# Patient Record
Sex: FEMALE | Race: BLACK OR AFRICAN AMERICAN | NOT HISPANIC OR LATINO | Employment: FULL TIME | RURAL
[De-identification: names, ages, dates, MRNs, and addresses within clinical notes are randomized per-mention and may not be internally consistent; named-entity substitution may affect disease eponyms.]

---

## 2020-06-03 ENCOUNTER — HISTORICAL (OUTPATIENT)
Dept: ADMINISTRATIVE | Facility: HOSPITAL | Age: 60
End: 2020-06-03

## 2020-07-20 ENCOUNTER — HISTORICAL (OUTPATIENT)
Dept: ADMINISTRATIVE | Facility: HOSPITAL | Age: 60
End: 2020-07-20

## 2020-08-03 ENCOUNTER — HISTORICAL (OUTPATIENT)
Dept: ADMINISTRATIVE | Facility: HOSPITAL | Age: 60
End: 2020-08-03

## 2021-03-31 ENCOUNTER — TELEPHONE (OUTPATIENT)
Dept: FAMILY MEDICINE | Facility: CLINIC | Age: 61
End: 2021-03-31

## 2021-05-18 ENCOUNTER — OFFICE VISIT (OUTPATIENT)
Dept: FAMILY MEDICINE | Facility: CLINIC | Age: 61
End: 2021-05-18
Payer: COMMERCIAL

## 2021-05-18 VITALS
HEIGHT: 67 IN | DIASTOLIC BLOOD PRESSURE: 82 MMHG | OXYGEN SATURATION: 99 % | HEART RATE: 78 BPM | RESPIRATION RATE: 20 BRPM | BODY MASS INDEX: 45.99 KG/M2 | TEMPERATURE: 98 F | SYSTOLIC BLOOD PRESSURE: 136 MMHG | WEIGHT: 293 LBS

## 2021-05-18 DIAGNOSIS — M19.90 ARTHRITIS: ICD-10-CM

## 2021-05-18 DIAGNOSIS — E03.9 HYPOTHYROIDISM, UNSPECIFIED TYPE: Primary | ICD-10-CM

## 2021-05-18 DIAGNOSIS — G89.29 OTHER CHRONIC PAIN: ICD-10-CM

## 2021-05-18 DIAGNOSIS — M79.669 PAIN AND SWELLING OF LOWER LEG, UNSPECIFIED LATERALITY: ICD-10-CM

## 2021-05-18 DIAGNOSIS — M10.9 GOUT, UNSPECIFIED CAUSE, UNSPECIFIED CHRONICITY, UNSPECIFIED SITE: ICD-10-CM

## 2021-05-18 DIAGNOSIS — M79.89 PAIN AND SWELLING OF LOWER LEG, UNSPECIFIED LATERALITY: ICD-10-CM

## 2021-05-18 DIAGNOSIS — E78.5 HYPERLIPIDEMIA, UNSPECIFIED HYPERLIPIDEMIA TYPE: ICD-10-CM

## 2021-05-18 DIAGNOSIS — I10 ESSENTIAL HYPERTENSION: ICD-10-CM

## 2021-05-18 LAB
ALBUMIN SERPL BCP-MCNC: 3.6 G/DL (ref 3.5–5)
ALBUMIN/GLOB SERPL: 1.2 {RATIO}
ALP SERPL-CCNC: 58 U/L (ref 46–118)
ALT SERPL W P-5'-P-CCNC: 19 U/L (ref 13–56)
ANION GAP SERPL CALCULATED.3IONS-SCNC: 8 MMOL/L (ref 7–16)
AST SERPL W P-5'-P-CCNC: 15 U/L (ref 15–37)
BILIRUB SERPL-MCNC: 0.7 MG/DL (ref 0–1.2)
BUN SERPL-MCNC: 9 MG/DL (ref 7–18)
BUN/CREAT SERPL: 12 (ref 6–20)
CALCIUM SERPL-MCNC: 8.8 MG/DL (ref 8.5–10.1)
CHLORIDE SERPL-SCNC: 107 MMOL/L (ref 98–107)
CO2 SERPL-SCNC: 33 MMOL/L (ref 21–32)
CREAT SERPL-MCNC: 0.78 MG/DL (ref 0.55–1.02)
GLOBULIN SER-MCNC: 3.1 G/DL (ref 2–4)
GLUCOSE SERPL-MCNC: 90 MG/DL (ref 74–106)
POTASSIUM SERPL-SCNC: 4.3 MMOL/L (ref 3.5–5.1)
PROT SERPL-MCNC: 6.7 G/DL (ref 6.4–8.2)
SODIUM SERPL-SCNC: 144 MMOL/L (ref 136–145)
T4 FREE SERPL-MCNC: 0.81 NG/DL (ref 0.76–1.46)
TSH SERPL DL<=0.005 MIU/L-ACNC: 4.08 UIU/ML (ref 0.36–3.74)
URATE SERPL-MCNC: 5.2 MG/DL (ref 2.6–6)

## 2021-05-18 PROCEDURE — 84439 ASSAY OF FREE THYROXINE: CPT | Mod: ,,, | Performed by: CLINICAL MEDICAL LABORATORY

## 2021-05-18 PROCEDURE — 80053 COMPREHENSIVE METABOLIC PANEL: ICD-10-PCS | Mod: ,,, | Performed by: CLINICAL MEDICAL LABORATORY

## 2021-05-18 PROCEDURE — 84550 ASSAY OF BLOOD/URIC ACID: CPT | Mod: ,,, | Performed by: CLINICAL MEDICAL LABORATORY

## 2021-05-18 PROCEDURE — 99051 MED SERV EVE/WKEND/HOLIDAY: CPT | Mod: ,,, | Performed by: FAMILY MEDICINE

## 2021-05-18 PROCEDURE — 84550 URIC ACID: ICD-10-PCS | Mod: ,,, | Performed by: CLINICAL MEDICAL LABORATORY

## 2021-05-18 PROCEDURE — 84443 TSH: ICD-10-PCS | Mod: ,,, | Performed by: CLINICAL MEDICAL LABORATORY

## 2021-05-18 PROCEDURE — 80053 COMPREHEN METABOLIC PANEL: CPT | Mod: ,,, | Performed by: CLINICAL MEDICAL LABORATORY

## 2021-05-18 PROCEDURE — 99214 OFFICE O/P EST MOD 30 MIN: CPT | Mod: ,,, | Performed by: FAMILY MEDICINE

## 2021-05-18 PROCEDURE — 99214 PR OFFICE/OUTPT VISIT, EST, LEVL IV, 30-39 MIN: ICD-10-PCS | Mod: ,,, | Performed by: FAMILY MEDICINE

## 2021-05-18 PROCEDURE — 99051 PR MEDICAL SERVICES, EVE/WKEND/HOLIDAY: ICD-10-PCS | Mod: ,,, | Performed by: FAMILY MEDICINE

## 2021-05-18 PROCEDURE — 84443 ASSAY THYROID STIM HORMONE: CPT | Mod: ,,, | Performed by: CLINICAL MEDICAL LABORATORY

## 2021-05-18 PROCEDURE — 84439 T4, FREE: ICD-10-PCS | Mod: ,,, | Performed by: CLINICAL MEDICAL LABORATORY

## 2021-05-18 RX ORDER — MELOXICAM 15 MG/1
15 TABLET ORAL DAILY
COMMUNITY
End: 2021-05-18 | Stop reason: SDUPTHER

## 2021-05-18 RX ORDER — TRAMADOL HYDROCHLORIDE 50 MG/1
50 TABLET ORAL EVERY 8 HOURS PRN
COMMUNITY
Start: 2021-03-11 | End: 2021-05-18 | Stop reason: SDUPTHER

## 2021-05-18 RX ORDER — ATENOLOL AND CHLORTHALIDONE TABLET 50; 25 MG/1; MG/1
1 TABLET ORAL DAILY
Qty: 90 TABLET | Refills: 1 | Status: SHIPPED | OUTPATIENT
Start: 2021-05-18 | End: 2021-12-20 | Stop reason: SDUPTHER

## 2021-05-18 RX ORDER — LEVOTHYROXINE SODIUM 75 UG/1
75 TABLET ORAL
COMMUNITY
End: 2021-05-18 | Stop reason: SDUPTHER

## 2021-05-18 RX ORDER — METHOCARBAMOL 750 MG/1
TABLET, FILM COATED ORAL
Qty: 30 TABLET | Refills: 4 | Status: SHIPPED | OUTPATIENT
Start: 2021-05-18 | End: 2022-02-16 | Stop reason: SDUPTHER

## 2021-05-18 RX ORDER — METHOCARBAMOL 750 MG/1
TABLET, FILM COATED ORAL
COMMUNITY
Start: 2021-03-22 | End: 2021-05-18 | Stop reason: SDUPTHER

## 2021-05-18 RX ORDER — MELOXICAM 15 MG/1
15 TABLET ORAL DAILY
Qty: 90 TABLET | Refills: 1 | Status: SHIPPED | OUTPATIENT
Start: 2021-05-18 | End: 2021-11-14

## 2021-05-18 RX ORDER — NAPROXEN 500 MG/1
500 TABLET ORAL 2 TIMES DAILY PRN
COMMUNITY
Start: 2021-03-11 | End: 2021-05-18

## 2021-05-18 RX ORDER — TRAMADOL HYDROCHLORIDE 50 MG/1
50 TABLET ORAL EVERY 8 HOURS PRN
Qty: 15 TABLET | Refills: 0 | Status: SHIPPED | OUTPATIENT
Start: 2021-05-18 | End: 2022-02-16

## 2021-05-18 RX ORDER — ATENOLOL AND CHLORTHALIDONE TABLET 50; 25 MG/1; MG/1
1 TABLET ORAL DAILY
COMMUNITY
End: 2021-05-18 | Stop reason: SDUPTHER

## 2021-05-18 RX ORDER — ALLOPURINOL 100 MG/1
100 TABLET ORAL DAILY
Qty: 90 TABLET | Refills: 1 | Status: SHIPPED | OUTPATIENT
Start: 2021-05-18 | End: 2021-11-14

## 2021-05-18 RX ORDER — LEVOTHYROXINE SODIUM 75 UG/1
75 TABLET ORAL
Qty: 90 TABLET | Refills: 1 | Status: SHIPPED | OUTPATIENT
Start: 2021-05-18 | End: 2021-10-11

## 2021-05-18 RX ORDER — ALLOPURINOL 100 MG/1
100 TABLET ORAL DAILY
COMMUNITY
Start: 2021-03-22 | End: 2021-05-18 | Stop reason: SDUPTHER

## 2021-06-01 ENCOUNTER — OFFICE VISIT (OUTPATIENT)
Dept: VASCULAR SURGERY | Facility: CLINIC | Age: 61
End: 2021-06-01
Payer: COMMERCIAL

## 2021-06-01 VITALS
RESPIRATION RATE: 12 BRPM | BODY MASS INDEX: 45.64 KG/M2 | DIASTOLIC BLOOD PRESSURE: 82 MMHG | HEIGHT: 67 IN | HEART RATE: 76 BPM | SYSTOLIC BLOOD PRESSURE: 134 MMHG | WEIGHT: 290.81 LBS

## 2021-06-01 DIAGNOSIS — R60.0 EDEMA, LOWER EXTREMITY: Primary | ICD-10-CM

## 2021-06-01 DIAGNOSIS — M79.604 LEG PAIN, BILATERAL: ICD-10-CM

## 2021-06-01 DIAGNOSIS — R23.8 OTHER SKIN CHANGES: ICD-10-CM

## 2021-06-01 DIAGNOSIS — M79.605 LEG PAIN, BILATERAL: ICD-10-CM

## 2021-06-01 PROCEDURE — 99213 OFFICE O/P EST LOW 20 MIN: CPT | Mod: PBBFAC | Performed by: FAMILY MEDICINE

## 2021-06-01 PROCEDURE — 99203 PR OFFICE/OUTPT VISIT, NEW, LEVL III, 30-44 MIN: ICD-10-PCS | Mod: S$PBB,,, | Performed by: FAMILY MEDICINE

## 2021-06-01 PROCEDURE — 99203 OFFICE O/P NEW LOW 30 MIN: CPT | Mod: S$PBB,,, | Performed by: FAMILY MEDICINE

## 2021-06-01 RX ORDER — DICLOFENAC SODIUM 10 MG/G
GEL TOPICAL
COMMUNITY
Start: 2020-12-29 | End: 2023-03-30 | Stop reason: SDUPTHER

## 2021-06-07 DIAGNOSIS — R60.0 EDEMA, LOWER EXTREMITY: ICD-10-CM

## 2021-06-07 DIAGNOSIS — R23.8 OTHER SKIN CHANGES: ICD-10-CM

## 2021-06-07 DIAGNOSIS — M79.604 LEG PAIN, BILATERAL: Primary | ICD-10-CM

## 2021-06-07 DIAGNOSIS — M79.605 LEG PAIN, BILATERAL: Primary | ICD-10-CM

## 2021-06-08 ENCOUNTER — HOSPITAL ENCOUNTER (OUTPATIENT)
Dept: RADIOLOGY | Facility: HOSPITAL | Age: 61
Discharge: HOME | End: 2021-06-08
Attending: FAMILY MEDICINE
Payer: COMMERCIAL

## 2021-06-08 ENCOUNTER — OFFICE VISIT (OUTPATIENT)
Dept: VASCULAR SURGERY | Facility: CLINIC | Age: 61
End: 2021-06-08
Payer: COMMERCIAL

## 2021-06-08 VITALS
HEART RATE: 60 BPM | WEIGHT: 288.81 LBS | BODY MASS INDEX: 45.33 KG/M2 | RESPIRATION RATE: 16 BRPM | HEIGHT: 67 IN | SYSTOLIC BLOOD PRESSURE: 120 MMHG | DIASTOLIC BLOOD PRESSURE: 80 MMHG

## 2021-06-08 DIAGNOSIS — M79.605 LEG PAIN, BILATERAL: ICD-10-CM

## 2021-06-08 DIAGNOSIS — R60.0 EDEMA, LOWER EXTREMITY: ICD-10-CM

## 2021-06-08 DIAGNOSIS — R23.8 OTHER SKIN CHANGES: ICD-10-CM

## 2021-06-08 DIAGNOSIS — M79.604 LEG PAIN, BILATERAL: ICD-10-CM

## 2021-06-08 DIAGNOSIS — I87.2 VENOUS INSUFFICIENCY: Primary | ICD-10-CM

## 2021-06-08 PROCEDURE — 99214 PR OFFICE/OUTPT VISIT, EST, LEVL IV, 30-39 MIN: ICD-10-PCS | Mod: S$PBB,,, | Performed by: FAMILY MEDICINE

## 2021-06-08 PROCEDURE — 99214 OFFICE O/P EST MOD 30 MIN: CPT | Mod: PBBFAC,25 | Performed by: FAMILY MEDICINE

## 2021-06-08 PROCEDURE — 93970 US VENOUS REFLUX STUDY BILATERAL: ICD-10-PCS | Mod: 26,,, | Performed by: FAMILY MEDICINE

## 2021-06-08 PROCEDURE — 93970 EXTREMITY STUDY: CPT | Mod: TC

## 2021-06-08 PROCEDURE — 93970 EXTREMITY STUDY: CPT | Mod: 26,,, | Performed by: FAMILY MEDICINE

## 2021-06-08 PROCEDURE — 99214 OFFICE O/P EST MOD 30 MIN: CPT | Mod: S$PBB,,, | Performed by: FAMILY MEDICINE

## 2021-07-21 ENCOUNTER — OFFICE VISIT (OUTPATIENT)
Dept: FAMILY MEDICINE | Facility: CLINIC | Age: 61
End: 2021-07-21
Payer: COMMERCIAL

## 2021-07-21 VITALS
OXYGEN SATURATION: 99 % | SYSTOLIC BLOOD PRESSURE: 130 MMHG | BODY MASS INDEX: 45.99 KG/M2 | RESPIRATION RATE: 20 BRPM | HEIGHT: 67 IN | HEART RATE: 63 BPM | DIASTOLIC BLOOD PRESSURE: 68 MMHG | WEIGHT: 293 LBS

## 2021-07-21 DIAGNOSIS — L72.9 SKIN CYST: Primary | ICD-10-CM

## 2021-07-21 PROCEDURE — 99213 OFFICE O/P EST LOW 20 MIN: CPT | Mod: ,,, | Performed by: FAMILY MEDICINE

## 2021-07-21 PROCEDURE — 99213 PR OFFICE/OUTPT VISIT, EST, LEVL III, 20-29 MIN: ICD-10-PCS | Mod: ,,, | Performed by: FAMILY MEDICINE

## 2021-07-27 DIAGNOSIS — L72.9 SKIN CYST: Primary | ICD-10-CM

## 2021-07-28 ENCOUNTER — TELEPHONE (OUTPATIENT)
Dept: FAMILY MEDICINE | Facility: CLINIC | Age: 61
End: 2021-07-28

## 2021-09-02 ENCOUNTER — OFFICE VISIT (OUTPATIENT)
Dept: SURGERY | Facility: CLINIC | Age: 61
End: 2021-09-02
Attending: SURGERY
Payer: COMMERCIAL

## 2021-09-02 VITALS — HEIGHT: 68 IN | BODY MASS INDEX: 44.41 KG/M2 | WEIGHT: 293 LBS

## 2021-09-02 DIAGNOSIS — R22.0 SCALP MASS: Primary | ICD-10-CM

## 2021-09-02 PROCEDURE — 21012 PR EXCISION TUMOR SOFT TISS FACE/SCALP SUBQ 2+CM: ICD-10-PCS | Mod: S$PBB,,, | Performed by: SURGERY

## 2021-09-02 PROCEDURE — 99203 PR OFFICE/OUTPT VISIT, NEW, LEVL III, 30-44 MIN: ICD-10-PCS | Mod: S$PBB,25,, | Performed by: SURGERY

## 2021-09-02 PROCEDURE — 99213 OFFICE O/P EST LOW 20 MIN: CPT | Mod: PBBFAC | Performed by: SURGERY

## 2021-09-02 PROCEDURE — 96372 THER/PROPH/DIAG INJ SC/IM: CPT | Mod: PBBFAC | Performed by: SURGERY

## 2021-09-02 PROCEDURE — 88304 TISSUE EXAM BY PATHOLOGIST: CPT | Mod: 26,,, | Performed by: PATHOLOGY

## 2021-09-02 PROCEDURE — 21012 EXC FACE LES SBQ 2 CM/>: CPT | Mod: PBBFAC | Performed by: SURGERY

## 2021-09-02 PROCEDURE — 88304 TISSUE EXAM BY PATHOLOGIST: CPT | Mod: SUR | Performed by: SURGERY

## 2021-09-02 PROCEDURE — 21012 EXC FACE LES SBQ 2 CM/>: CPT | Mod: S$PBB,,, | Performed by: SURGERY

## 2021-09-02 PROCEDURE — 88304 SURGICAL PATHOLOGY: ICD-10-PCS | Mod: 26,,, | Performed by: PATHOLOGY

## 2021-09-02 PROCEDURE — 99203 OFFICE O/P NEW LOW 30 MIN: CPT | Mod: S$PBB,25,, | Performed by: SURGERY

## 2021-09-02 RX ORDER — LIDOCAINE HYDROCHLORIDE AND EPINEPHRINE 10; 10 MG/ML; UG/ML
10 INJECTION, SOLUTION INFILTRATION; PERINEURAL ONCE
Status: COMPLETED | OUTPATIENT
Start: 2021-09-02 | End: 2021-09-02

## 2021-09-02 RX ORDER — LIDOCAINE HYDROCHLORIDE AND EPINEPHRINE 10; 10 MG/ML; UG/ML
10 INJECTION, SOLUTION INFILTRATION; PERINEURAL ONCE
Qty: 10 ML | Refills: 0 | Status: SHIPPED | OUTPATIENT
Start: 2021-09-02 | End: 2021-09-02 | Stop reason: CLARIF

## 2021-09-02 RX ORDER — LIDOCAINE HYDROCHLORIDE AND EPINEPHRINE 10; 10 MG/ML; UG/ML
1 INJECTION, SOLUTION INFILTRATION; PERINEURAL ONCE
Qty: 10 ML | Refills: 0 | Status: SHIPPED | OUTPATIENT
Start: 2021-09-02 | End: 2021-09-02 | Stop reason: SDUPTHER

## 2021-09-02 RX ADMIN — LIDOCAINE HYDROCHLORIDE AND EPINEPHRINE 10 ML: 10; 10 INJECTION, SOLUTION INFILTRATION; PERINEURAL at 04:09

## 2021-09-07 LAB
ESTROGEN SERPL-MCNC: NORMAL PG/ML
LAB AP CLINICAL INFORMATION: NORMAL
LAB AP GROSS DESCRIPTION: NORMAL
LAB AP LABORATORY NOTES: NORMAL
T3RU NFR SERPL: NORMAL %

## 2021-09-22 ENCOUNTER — OFFICE VISIT (OUTPATIENT)
Dept: SURGERY | Facility: CLINIC | Age: 61
End: 2021-09-22
Attending: SURGERY
Payer: COMMERCIAL

## 2021-09-22 DIAGNOSIS — Z09 FOLLOW-UP EXAMINATION, FOLLOWING OTHER SURGERY: Primary | ICD-10-CM

## 2021-09-22 PROCEDURE — 99213 OFFICE O/P EST LOW 20 MIN: CPT | Mod: PBBFAC | Performed by: SURGERY

## 2021-09-22 PROCEDURE — 99024 PR POST-OP FOLLOW-UP VISIT: ICD-10-PCS | Mod: ,,, | Performed by: SURGERY

## 2021-09-22 PROCEDURE — 99024 POSTOP FOLLOW-UP VISIT: CPT | Mod: ,,, | Performed by: SURGERY

## 2021-10-13 ENCOUNTER — TELEPHONE (OUTPATIENT)
Dept: FAMILY MEDICINE | Facility: CLINIC | Age: 61
End: 2021-10-13

## 2022-02-16 ENCOUNTER — OFFICE VISIT (OUTPATIENT)
Dept: FAMILY MEDICINE | Facility: CLINIC | Age: 62
End: 2022-02-16
Payer: COMMERCIAL

## 2022-02-16 VITALS
BODY MASS INDEX: 45.99 KG/M2 | SYSTOLIC BLOOD PRESSURE: 146 MMHG | HEART RATE: 64 BPM | DIASTOLIC BLOOD PRESSURE: 90 MMHG | WEIGHT: 293 LBS | OXYGEN SATURATION: 99 % | TEMPERATURE: 98 F | HEIGHT: 67 IN

## 2022-02-16 DIAGNOSIS — M10.9 GOUT, UNSPECIFIED CAUSE, UNSPECIFIED CHRONICITY, UNSPECIFIED SITE: ICD-10-CM

## 2022-02-16 DIAGNOSIS — G89.29 OTHER CHRONIC PAIN: ICD-10-CM

## 2022-02-16 DIAGNOSIS — Z12.4 CERVICAL CANCER SCREENING: ICD-10-CM

## 2022-02-16 DIAGNOSIS — Z12.31 ENCOUNTER FOR SCREENING MAMMOGRAM FOR MALIGNANT NEOPLASM OF BREAST: ICD-10-CM

## 2022-02-16 DIAGNOSIS — M19.90 ARTHRITIS: ICD-10-CM

## 2022-02-16 DIAGNOSIS — I10 ESSENTIAL HYPERTENSION: Primary | ICD-10-CM

## 2022-02-16 DIAGNOSIS — E78.5 HYPERLIPIDEMIA, UNSPECIFIED HYPERLIPIDEMIA TYPE: ICD-10-CM

## 2022-02-16 DIAGNOSIS — Z11.59 ENCOUNTER FOR HEPATITIS C SCREENING TEST FOR LOW RISK PATIENT: ICD-10-CM

## 2022-02-16 DIAGNOSIS — E03.9 HYPOTHYROIDISM, UNSPECIFIED TYPE: ICD-10-CM

## 2022-02-16 LAB
ALBUMIN SERPL BCP-MCNC: 3.7 G/DL (ref 3.5–5)
ALBUMIN/GLOB SERPL: 1.2 {RATIO}
ALP SERPL-CCNC: 63 U/L (ref 46–118)
ALT SERPL W P-5'-P-CCNC: 20 U/L (ref 13–56)
ANION GAP SERPL CALCULATED.3IONS-SCNC: 8 MMOL/L (ref 7–16)
AST SERPL W P-5'-P-CCNC: 15 U/L (ref 15–37)
BILIRUB SERPL-MCNC: 0.6 MG/DL (ref 0–1.2)
BUN SERPL-MCNC: 17 MG/DL (ref 7–18)
BUN/CREAT SERPL: 21 (ref 6–20)
CALCIUM SERPL-MCNC: 9 MG/DL (ref 8.5–10.1)
CHLORIDE SERPL-SCNC: 105 MMOL/L (ref 98–107)
CHOLEST SERPL-MCNC: 184 MG/DL (ref 0–200)
CHOLEST/HDLC SERPL: 2.5 {RATIO}
CO2 SERPL-SCNC: 33 MMOL/L (ref 21–32)
CREAT SERPL-MCNC: 0.81 MG/DL (ref 0.55–1.02)
GLOBULIN SER-MCNC: 3 G/DL (ref 2–4)
GLUCOSE SERPL-MCNC: 93 MG/DL (ref 74–106)
HCV AB SER QL: NORMAL
HDLC SERPL-MCNC: 75 MG/DL (ref 40–60)
LDLC SERPL CALC-MCNC: 100 MG/DL
LDLC/HDLC SERPL: 1.3 {RATIO}
NONHDLC SERPL-MCNC: 109 MG/DL
POTASSIUM SERPL-SCNC: 4.4 MMOL/L (ref 3.5–5.1)
PROT SERPL-MCNC: 6.7 G/DL (ref 6.4–8.2)
SODIUM SERPL-SCNC: 142 MMOL/L (ref 136–145)
T4 FREE SERPL-MCNC: 0.9 NG/DL (ref 0.76–1.46)
TRIGL SERPL-MCNC: 47 MG/DL (ref 35–150)
TSH SERPL DL<=0.005 MIU/L-ACNC: 3.21 UIU/ML (ref 0.36–3.74)
VLDLC SERPL-MCNC: 9 MG/DL

## 2022-02-16 PROCEDURE — 80053 COMPREHEN METABOLIC PANEL: CPT | Mod: ,,, | Performed by: CLINICAL MEDICAL LABORATORY

## 2022-02-16 PROCEDURE — 1160F PR REVIEW ALL MEDS BY PRESCRIBER/CLIN PHARMACIST DOCUMENTED: ICD-10-PCS | Mod: CPTII,,, | Performed by: FAMILY MEDICINE

## 2022-02-16 PROCEDURE — 80061 LIPID PANEL: ICD-10-PCS | Mod: ,,, | Performed by: CLINICAL MEDICAL LABORATORY

## 2022-02-16 PROCEDURE — 1160F RVW MEDS BY RX/DR IN RCRD: CPT | Mod: CPTII,,, | Performed by: FAMILY MEDICINE

## 2022-02-16 PROCEDURE — 86803 HEPATITIS C ANTIBODY: ICD-10-PCS | Mod: ,,, | Performed by: CLINICAL MEDICAL LABORATORY

## 2022-02-16 PROCEDURE — 80061 LIPID PANEL: CPT | Mod: ,,, | Performed by: CLINICAL MEDICAL LABORATORY

## 2022-02-16 PROCEDURE — 99214 OFFICE O/P EST MOD 30 MIN: CPT | Mod: ,,, | Performed by: FAMILY MEDICINE

## 2022-02-16 PROCEDURE — 3077F PR MOST RECENT SYSTOLIC BLOOD PRESSURE >= 140 MM HG: ICD-10-PCS | Mod: CPTII,,, | Performed by: FAMILY MEDICINE

## 2022-02-16 PROCEDURE — 3080F DIAST BP >= 90 MM HG: CPT | Mod: CPTII,,, | Performed by: FAMILY MEDICINE

## 2022-02-16 PROCEDURE — 1159F MED LIST DOCD IN RCRD: CPT | Mod: CPTII,,, | Performed by: FAMILY MEDICINE

## 2022-02-16 PROCEDURE — 84443 ASSAY THYROID STIM HORMONE: CPT | Mod: ,,, | Performed by: CLINICAL MEDICAL LABORATORY

## 2022-02-16 PROCEDURE — 99214 PR OFFICE/OUTPT VISIT, EST, LEVL IV, 30-39 MIN: ICD-10-PCS | Mod: ,,, | Performed by: FAMILY MEDICINE

## 2022-02-16 PROCEDURE — 80053 COMPREHENSIVE METABOLIC PANEL: ICD-10-PCS | Mod: ,,, | Performed by: CLINICAL MEDICAL LABORATORY

## 2022-02-16 PROCEDURE — 84443 TSH: ICD-10-PCS | Mod: ,,, | Performed by: CLINICAL MEDICAL LABORATORY

## 2022-02-16 PROCEDURE — 86803 HEPATITIS C AB TEST: CPT | Mod: ,,, | Performed by: CLINICAL MEDICAL LABORATORY

## 2022-02-16 PROCEDURE — 1159F PR MEDICATION LIST DOCUMENTED IN MEDICAL RECORD: ICD-10-PCS | Mod: CPTII,,, | Performed by: FAMILY MEDICINE

## 2022-02-16 PROCEDURE — 3077F SYST BP >= 140 MM HG: CPT | Mod: CPTII,,, | Performed by: FAMILY MEDICINE

## 2022-02-16 PROCEDURE — 3008F PR BODY MASS INDEX (BMI) DOCUMENTED: ICD-10-PCS | Mod: CPTII,,, | Performed by: FAMILY MEDICINE

## 2022-02-16 PROCEDURE — 3080F PR MOST RECENT DIASTOLIC BLOOD PRESSURE >= 90 MM HG: ICD-10-PCS | Mod: CPTII,,, | Performed by: FAMILY MEDICINE

## 2022-02-16 PROCEDURE — 84439 ASSAY OF FREE THYROXINE: CPT | Mod: ,,, | Performed by: CLINICAL MEDICAL LABORATORY

## 2022-02-16 PROCEDURE — 84439 T4, FREE: ICD-10-PCS | Mod: ,,, | Performed by: CLINICAL MEDICAL LABORATORY

## 2022-02-16 PROCEDURE — 3008F BODY MASS INDEX DOCD: CPT | Mod: CPTII,,, | Performed by: FAMILY MEDICINE

## 2022-02-16 RX ORDER — OXAPROZIN 600 MG/1
600 TABLET, FILM COATED ORAL 2 TIMES DAILY PRN
Qty: 60 TABLET | Refills: 5 | Status: SHIPPED | OUTPATIENT
Start: 2022-02-16 | End: 2022-08-30 | Stop reason: SDUPTHER

## 2022-02-16 RX ORDER — METHOCARBAMOL 750 MG/1
TABLET, FILM COATED ORAL
Qty: 30 TABLET | Refills: 4 | Status: SHIPPED | OUTPATIENT
Start: 2022-02-16 | End: 2023-09-05 | Stop reason: SDUPTHER

## 2022-02-16 RX ORDER — ATENOLOL AND CHLORTHALIDONE TABLET 50; 25 MG/1; MG/1
1 TABLET ORAL DAILY
Qty: 90 TABLET | Refills: 1 | Status: SHIPPED | OUTPATIENT
Start: 2022-02-16 | End: 2022-08-30 | Stop reason: SDUPTHER

## 2022-02-16 RX ORDER — LEVOTHYROXINE SODIUM 75 UG/1
75 TABLET ORAL DAILY
Qty: 90 TABLET | Refills: 1 | Status: SHIPPED | OUTPATIENT
Start: 2022-02-16 | End: 2022-08-16

## 2022-02-16 RX ORDER — ALLOPURINOL 100 MG/1
100 TABLET ORAL DAILY
Qty: 90 TABLET | Refills: 1 | Status: SHIPPED | OUTPATIENT
Start: 2022-02-16 | End: 2022-08-30 | Stop reason: SDUPTHER

## 2022-02-16 NOTE — PROGRESS NOTES
[facility]    Chief Complaint      Chief Complaint   Patient presents with    Medication Refill    Leg Pain     Both legs are hurting x 1 year. Wanting to go see a RA specialist.        History of Present Illness      Angela Landaverde is a 61 y.o. female with chronic conditions of hypertension, hypothyroidism, gout, and arthritis who presents today for six-month follow-up.    The pt did not take her blood pressure last night.      Past Medical History:  Past Medical History:   Diagnosis Date    Arthritis     Hypertension     Thyroid disease        Past Surgical History:   has a past surgical history that includes  section and Tubal ligation.    Social History:  Social History     Tobacco Use    Smoking status: Never Smoker    Smokeless tobacco: Never Used   Substance Use Topics    Alcohol use: Not Currently     Comment: occasionally    Drug use: Never       I personally reviewed all past medical, surgical, and social.     Review of Systems   Constitutional: Negative for chills and fever.   HENT: Negative for ear pain and sore throat.    Eyes: Negative for blurred vision.   Respiratory: Negative for cough and shortness of breath.    Cardiovascular: Negative for chest pain and palpitations.   Gastrointestinal: Negative for abdominal pain and constipation.   Genitourinary: Negative for dysuria and hematuria.   Musculoskeletal: Positive for joint pain. Negative for back pain and falls.   Skin: Negative for itching and rash.   Neurological: Negative for weakness and headaches.   Endo/Heme/Allergies: Negative for polydipsia. Does not bruise/bleed easily.   Psychiatric/Behavioral: Negative for suicidal ideas. The patient does not have insomnia.         Medications:  Outpatient Encounter Medications as of 2022   Medication Sig Dispense Refill    diclofenac sodium (VOLTAREN) 1 % Gel APPLY 2 GRAMS TO AFFECTED AREAS EVERY 6 HOURS AS NEEDED      [DISCONTINUED] allopurinoL (ZYLOPRIM) 100 MG tablet  "TAKE 1 TABLET BY MOUTH EVERY DAY 90 tablet 1    [DISCONTINUED] atenoloL-chlorthalidone (TENORETIC) 50-25 mg Tab TAKE 1 TABLET BY MOUTH EVERY DAY 90 tablet 1    [DISCONTINUED] EUTHYROX 75 mcg tablet TAKE 1 TABLET BY MOUTH ONCE DAILY BEFORE BREAKFAST 90 tablet 0    allopurinoL (ZYLOPRIM) 100 MG tablet Take 1 tablet (100 mg total) by mouth once daily. 90 tablet 1    atenoloL-chlorthalidone (TENORETIC) 50-25 mg Tab Take 1 tablet by mouth once daily. 90 tablet 1    levothyroxine (EUTHYROX) 75 MCG tablet Take 1 tablet (75 mcg total) by mouth once daily. 90 tablet 1    methocarbamoL (ROBAXIN) 750 MG Tab TAKE 1 TABLET BY MOUTH 4 TIMES DAILY AS NEEDED 30 tablet 4    oxaprozin (DAYPRO) 600 mg tablet Take 1 tablet (600 mg total) by mouth 2 (two) times daily as needed (pain). 60 tablet 5    [DISCONTINUED] methocarbamoL (ROBAXIN) 750 MG Tab TAKE 1 TABLET BY MOUTH 4 TIMES DAILY AS NEEDED (Patient not taking: Reported on 2/16/2022) 30 tablet 4    [DISCONTINUED] traMADoL (ULTRAM) 50 mg tablet Take 1 tablet (50 mg total) by mouth every 8 (eight) hours as needed for Pain. (Patient not taking: Reported on 2/16/2022) 15 tablet 0     No facility-administered encounter medications on file as of 2/16/2022.       Allergies:  Review of patient's allergies indicates:  No Known Allergies    Health Maintenance:    There is no immunization history on file for this patient.   Health Maintenance   Topic Date Due    Hepatitis C Screening  Never done    Lipid Panel  Never done    TETANUS VACCINE  Never done    Mammogram  08/03/2021        Physical Exam      Vital Signs  Temp: 98.3 °F (36.8 °C)  Temp src: Oral  Pulse: 64  SpO2: 99 %  BP: (!) 146/90  BP Location: Left arm  Patient Position: Sitting  Height and Weight  Height: 5' 7" (170.2 cm)  Weight: (!) 138.3 kg (305 lb)  BSA (Calculated - sq m): 2.56 sq meters  BMI (Calculated): 47.8  Weight in (lb) to have BMI = 25: 159.3]    Physical Exam  Vitals and nursing note reviewed. "   Constitutional:       General: She is not in acute distress.     Appearance: Normal appearance. She is obese.   HENT:      Head: Normocephalic and atraumatic.      Right Ear: External ear normal.      Left Ear: External ear normal.   Eyes:      Conjunctiva/sclera: Conjunctivae normal.      Pupils: Pupils are equal, round, and reactive to light.   Cardiovascular:      Rate and Rhythm: Normal rate and regular rhythm.      Heart sounds: Normal heart sounds. No murmur heard.      Pulmonary:      Effort: Pulmonary effort is normal. No respiratory distress.      Breath sounds: Normal breath sounds.   Musculoskeletal:      Right lower leg: No edema.      Left lower leg: No edema.   Skin:     General: Skin is warm and dry.   Neurological:      General: No focal deficit present.      Mental Status: She is alert and oriented to person, place, and time. Mental status is at baseline.   Psychiatric:         Mood and Affect: Mood normal.         Behavior: Behavior normal.         Thought Content: Thought content normal.         Judgment: Judgment normal.          Laboratory:  CBC:      CMP:  Recent Labs   Lab 05/18/21  1638   Glucose 90   Calcium 8.8   Albumin 3.6   Total Protein 6.7   Sodium 144   Potassium 4.3   CO2 33 H   Chloride 107   BUN 9   Alk Phos 58   ALT 19   AST 15   Bilirubin, Total 0.7     LIPIDS:  Recent Labs   Lab 05/18/21  1638   TSH 4.080 H     TSH:  Recent Labs   Lab 05/18/21  1638   TSH 4.080 H     A1C:        Assessment/Plan     Angela Landaverde is a 61 y.o.female with:    1. Essential hypertension  - atenoloL-chlorthalidone (TENORETIC) 50-25 mg Tab; Take 1 tablet by mouth once daily.  Dispense: 90 tablet; Refill: 1  - Comprehensive Metabolic Panel; Future  - Comprehensive Metabolic Panel    2. Hyperlipidemia, unspecified hyperlipidemia type  - Lipid Panel; Future  - Lipid Panel    3. Hypothyroidism, unspecified type  - levothyroxine (EUTHYROX) 75 MCG tablet; Take 1 tablet (75 mcg total) by mouth once daily.   Dispense: 90 tablet; Refill: 1  - T4, Free; Future  - TSH; Future  - T4, Free  - TSH    4. Encounter for hepatitis C screening test for low risk patient  - Hepatitis C antibody; Future  - Hepatitis C antibody    5. Gout, unspecified cause, unspecified chronicity, unspecified site  - allopurinoL (ZYLOPRIM) 100 MG tablet; Take 1 tablet (100 mg total) by mouth once daily.  Dispense: 90 tablet; Refill: 1    6. Cervical cancer screening  - Ambulatory referral/consult to Gynecology; Future    7. Encounter for screening mammogram for malignant neoplasm of breast  - Mammo Digital Screening Bilat; Future    8. Other chronic pain  - methocarbamoL (ROBAXIN) 750 MG Tab; TAKE 1 TABLET BY MOUTH 4 TIMES DAILY AS NEEDED  Dispense: 30 tablet; Refill: 4    9. Arthritis  - oxaprozin (DAYPRO) 600 mg tablet; Take 1 tablet (600 mg total) by mouth 2 (two) times daily as needed (pain).  Dispense: 60 tablet; Refill: 5       Chronic conditions status updated as per HPI.  Other than changes above, cont current medications and maintain follow up with specialists.  Return to clinic in 6 months.    Joana Parmar DO  [facility]

## 2022-06-23 ENCOUNTER — HOSPITAL ENCOUNTER (OUTPATIENT)
Dept: RADIOLOGY | Facility: HOSPITAL | Age: 62
Discharge: HOME | End: 2022-06-23
Attending: FAMILY MEDICINE
Payer: COMMERCIAL

## 2022-06-23 VITALS — HEIGHT: 67 IN | WEIGHT: 293 LBS | BODY MASS INDEX: 45.99 KG/M2

## 2022-06-23 DIAGNOSIS — Z12.31 ENCOUNTER FOR SCREENING MAMMOGRAM FOR MALIGNANT NEOPLASM OF BREAST: ICD-10-CM

## 2022-06-23 PROCEDURE — 77067 SCR MAMMO BI INCL CAD: CPT | Mod: TC

## 2022-08-30 ENCOUNTER — OFFICE VISIT (OUTPATIENT)
Dept: FAMILY MEDICINE | Facility: CLINIC | Age: 62
End: 2022-08-30
Payer: COMMERCIAL

## 2022-08-30 VITALS
SYSTOLIC BLOOD PRESSURE: 138 MMHG | HEIGHT: 67 IN | RESPIRATION RATE: 18 BRPM | WEIGHT: 293 LBS | DIASTOLIC BLOOD PRESSURE: 80 MMHG | BODY MASS INDEX: 45.99 KG/M2 | OXYGEN SATURATION: 96 % | TEMPERATURE: 98 F | HEART RATE: 88 BPM

## 2022-08-30 DIAGNOSIS — M25.50 ARTHRALGIA, UNSPECIFIED JOINT: ICD-10-CM

## 2022-08-30 DIAGNOSIS — I10 ESSENTIAL HYPERTENSION: Primary | ICD-10-CM

## 2022-08-30 DIAGNOSIS — M10.9 GOUT, UNSPECIFIED CAUSE, UNSPECIFIED CHRONICITY, UNSPECIFIED SITE: ICD-10-CM

## 2022-08-30 DIAGNOSIS — M19.90 ARTHRITIS: ICD-10-CM

## 2022-08-30 DIAGNOSIS — E03.9 HYPOTHYROIDISM, UNSPECIFIED TYPE: ICD-10-CM

## 2022-08-30 LAB
ALBUMIN SERPL BCP-MCNC: 4 G/DL (ref 3.5–5)
ALBUMIN/GLOB SERPL: 1.2 {RATIO}
ALP SERPL-CCNC: 62 U/L (ref 46–118)
ALT SERPL W P-5'-P-CCNC: 24 U/L (ref 13–56)
ANION GAP SERPL CALCULATED.3IONS-SCNC: 12 MMOL/L (ref 7–16)
AST SERPL W P-5'-P-CCNC: 20 U/L (ref 15–37)
BILIRUB SERPL-MCNC: 0.7 MG/DL (ref ?–1.2)
BUN SERPL-MCNC: 14 MG/DL (ref 7–18)
BUN/CREAT SERPL: 16 (ref 6–20)
CALCIUM SERPL-MCNC: 9.6 MG/DL (ref 8.5–10.1)
CHLORIDE SERPL-SCNC: 102 MMOL/L (ref 98–107)
CO2 SERPL-SCNC: 31 MMOL/L (ref 21–32)
CREAT SERPL-MCNC: 0.86 MG/DL (ref 0.55–1.02)
CRP SERPL-MCNC: <0.29 MG/DL (ref 0–0.8)
EGFR (NO RACE VARIABLE) (RUSH/TITUS): 78 ML/MIN/1.73M²
ERYTHROCYTE [SEDIMENTATION RATE] IN BLOOD BY WESTERGREN METHOD: 15 MM/HR (ref 0–30)
GLOBULIN SER-MCNC: 3.3 G/DL (ref 2–4)
GLUCOSE SERPL-MCNC: 92 MG/DL (ref 74–106)
POTASSIUM SERPL-SCNC: 4.5 MMOL/L (ref 3.5–5.1)
PROT SERPL-MCNC: 7.3 G/DL (ref 6.4–8.2)
RHEUMATOID FACT SER NEPH-ACNC: NEGATIVE [IU]/ML
SODIUM SERPL-SCNC: 140 MMOL/L (ref 136–145)
T4 FREE SERPL-MCNC: 0.89 NG/DL (ref 0.76–1.46)
TSH SERPL DL<=0.005 MIU/L-ACNC: 4.44 UIU/ML (ref 0.36–3.74)
URATE SERPL-MCNC: 5.7 MG/DL (ref 2.6–6)

## 2022-08-30 PROCEDURE — 86140 C-REACTIVE PROTEIN: CPT | Mod: ,,, | Performed by: CLINICAL MEDICAL LABORATORY

## 2022-08-30 PROCEDURE — 99214 OFFICE O/P EST MOD 30 MIN: CPT | Mod: ,,, | Performed by: FAMILY MEDICINE

## 2022-08-30 PROCEDURE — 84439 ASSAY OF FREE THYROXINE: CPT | Mod: ,,, | Performed by: CLINICAL MEDICAL LABORATORY

## 2022-08-30 PROCEDURE — 86200 CCP ANTIBODY: CPT | Mod: ,,, | Performed by: CLINICAL MEDICAL LABORATORY

## 2022-08-30 PROCEDURE — 84550 URIC ACID: ICD-10-PCS | Mod: ,,, | Performed by: CLINICAL MEDICAL LABORATORY

## 2022-08-30 PROCEDURE — 84550 ASSAY OF BLOOD/URIC ACID: CPT | Mod: ,,, | Performed by: CLINICAL MEDICAL LABORATORY

## 2022-08-30 PROCEDURE — 3075F SYST BP GE 130 - 139MM HG: CPT | Mod: CPTII,,, | Performed by: FAMILY MEDICINE

## 2022-08-30 PROCEDURE — 86140 C-REACTIVE PROTEIN: ICD-10-PCS | Mod: ,,, | Performed by: CLINICAL MEDICAL LABORATORY

## 2022-08-30 PROCEDURE — 86430 RHEUMATOID FACTOR SCREEN: ICD-10-PCS | Mod: ,,, | Performed by: CLINICAL MEDICAL LABORATORY

## 2022-08-30 PROCEDURE — 86038 ANTINUCLEAR ANTIBODIES: CPT | Mod: ,,, | Performed by: CLINICAL MEDICAL LABORATORY

## 2022-08-30 PROCEDURE — 1159F MED LIST DOCD IN RCRD: CPT | Mod: CPTII,,, | Performed by: FAMILY MEDICINE

## 2022-08-30 PROCEDURE — 99214 PR OFFICE/OUTPT VISIT, EST, LEVL IV, 30-39 MIN: ICD-10-PCS | Mod: ,,, | Performed by: FAMILY MEDICINE

## 2022-08-30 PROCEDURE — 1160F PR REVIEW ALL MEDS BY PRESCRIBER/CLIN PHARMACIST DOCUMENTED: ICD-10-PCS | Mod: CPTII,,, | Performed by: FAMILY MEDICINE

## 2022-08-30 PROCEDURE — 80053 COMPREHENSIVE METABOLIC PANEL: ICD-10-PCS | Mod: ,,, | Performed by: CLINICAL MEDICAL LABORATORY

## 2022-08-30 PROCEDURE — 86430 RHEUMATOID FACTOR TEST QUAL: CPT | Mod: ,,, | Performed by: CLINICAL MEDICAL LABORATORY

## 2022-08-30 PROCEDURE — 85651 RBC SED RATE NONAUTOMATED: CPT | Mod: ,,, | Performed by: CLINICAL MEDICAL LABORATORY

## 2022-08-30 PROCEDURE — 3079F DIAST BP 80-89 MM HG: CPT | Mod: CPTII,,, | Performed by: FAMILY MEDICINE

## 2022-08-30 PROCEDURE — 86200 CYCLIC CITRUL PEPTIDE ANTIBODY, IGG: ICD-10-PCS | Mod: ,,, | Performed by: CLINICAL MEDICAL LABORATORY

## 2022-08-30 PROCEDURE — 86038 ANA EIA W/REFLEX DSDNA/ENA: ICD-10-PCS | Mod: ,,, | Performed by: CLINICAL MEDICAL LABORATORY

## 2022-08-30 PROCEDURE — 84443 TSH: ICD-10-PCS | Mod: ,,, | Performed by: CLINICAL MEDICAL LABORATORY

## 2022-08-30 PROCEDURE — 1159F PR MEDICATION LIST DOCUMENTED IN MEDICAL RECORD: ICD-10-PCS | Mod: CPTII,,, | Performed by: FAMILY MEDICINE

## 2022-08-30 PROCEDURE — 85651 SEDIMENTATION RATE, AUTOMATED: ICD-10-PCS | Mod: ,,, | Performed by: CLINICAL MEDICAL LABORATORY

## 2022-08-30 PROCEDURE — 84439 T4, FREE: ICD-10-PCS | Mod: ,,, | Performed by: CLINICAL MEDICAL LABORATORY

## 2022-08-30 PROCEDURE — 3008F BODY MASS INDEX DOCD: CPT | Mod: CPTII,,, | Performed by: FAMILY MEDICINE

## 2022-08-30 PROCEDURE — 84443 ASSAY THYROID STIM HORMONE: CPT | Mod: ,,, | Performed by: CLINICAL MEDICAL LABORATORY

## 2022-08-30 PROCEDURE — 1160F RVW MEDS BY RX/DR IN RCRD: CPT | Mod: CPTII,,, | Performed by: FAMILY MEDICINE

## 2022-08-30 PROCEDURE — 80053 COMPREHEN METABOLIC PANEL: CPT | Mod: ,,, | Performed by: CLINICAL MEDICAL LABORATORY

## 2022-08-30 PROCEDURE — 3075F PR MOST RECENT SYSTOLIC BLOOD PRESS GE 130-139MM HG: ICD-10-PCS | Mod: CPTII,,, | Performed by: FAMILY MEDICINE

## 2022-08-30 PROCEDURE — 3008F PR BODY MASS INDEX (BMI) DOCUMENTED: ICD-10-PCS | Mod: CPTII,,, | Performed by: FAMILY MEDICINE

## 2022-08-30 PROCEDURE — 3079F PR MOST RECENT DIASTOLIC BLOOD PRESSURE 80-89 MM HG: ICD-10-PCS | Mod: CPTII,,, | Performed by: FAMILY MEDICINE

## 2022-08-30 RX ORDER — GABAPENTIN 100 MG/1
300 CAPSULE ORAL DAILY PRN
Qty: 90 CAPSULE | Refills: 1 | Status: SHIPPED | OUTPATIENT
Start: 2022-08-30 | End: 2024-04-03 | Stop reason: SDUPTHER

## 2022-08-30 RX ORDER — ATENOLOL AND CHLORTHALIDONE TABLET 50; 25 MG/1; MG/1
1 TABLET ORAL DAILY
Qty: 90 TABLET | Refills: 2 | Status: SHIPPED | OUTPATIENT
Start: 2022-08-30 | End: 2023-09-05 | Stop reason: SDUPTHER

## 2022-08-30 RX ORDER — ALLOPURINOL 100 MG/1
100 TABLET ORAL DAILY
Qty: 90 TABLET | Refills: 2 | Status: SHIPPED | OUTPATIENT
Start: 2022-08-30 | End: 2023-09-05 | Stop reason: SDUPTHER

## 2022-08-30 RX ORDER — OXAPROZIN 600 MG/1
600 TABLET, FILM COATED ORAL 2 TIMES DAILY PRN
Qty: 60 TABLET | Refills: 5 | Status: SHIPPED | OUTPATIENT
Start: 2022-08-30 | End: 2023-09-05 | Stop reason: SINTOL

## 2022-08-30 RX ORDER — LEVOTHYROXINE SODIUM 75 UG/1
75 TABLET ORAL DAILY
Qty: 90 TABLET | Refills: 2 | Status: SHIPPED | OUTPATIENT
Start: 2022-08-30 | End: 2022-08-31

## 2022-08-30 RX ORDER — NYSTATIN AND TRIAMCINOLONE ACETONIDE 100000; 1 [USP'U]/G; MG/G
1 CREAM TOPICAL
COMMUNITY
Start: 2022-06-21

## 2022-08-30 NOTE — PROGRESS NOTES
[facility]    Chief Complaint      Chief Complaint   Patient presents with    Hypertension     6 month\  fasting       History of Present Illness      Angela Landaverde is a 61 y.o. female with chronic conditions of hypertension, hypothyroidism, gout, and arthritis who presents today for six-month follow-up.    HPI    Past Medical History:  Past Medical History:   Diagnosis Date    Arthritis     Hypertension     Thyroid disease        Past Surgical History:   has a past surgical history that includes  section and Tubal ligation.    Social History:  Social History     Tobacco Use    Smoking status: Never    Smokeless tobacco: Never   Substance Use Topics    Alcohol use: Not Currently     Comment: occasionally    Drug use: Never       I personally reviewed all past medical, surgical, and social.     Review of Systems   Constitutional:  Negative for chills and fever.   HENT:  Negative for ear pain and sore throat.    Eyes:  Negative for blurred vision.   Respiratory:  Negative for cough and shortness of breath.    Cardiovascular:  Negative for chest pain and palpitations.   Gastrointestinal:  Negative for abdominal pain and constipation.   Genitourinary:  Negative for dysuria and hematuria.   Musculoskeletal:  Positive for joint pain. Negative for back pain and falls.   Skin:  Negative for itching and rash.   Neurological:  Negative for weakness and headaches.   Endo/Heme/Allergies:  Negative for polydipsia. Does not bruise/bleed easily.   Psychiatric/Behavioral:  Negative for suicidal ideas. The patient does not have insomnia.       Medications:  Outpatient Encounter Medications as of 2022   Medication Sig Dispense Refill    diclofenac sodium (VOLTAREN) 1 % Gel APPLY 2 GRAMS TO AFFECTED AREAS EVERY 6 HOURS AS NEEDED      methocarbamoL (ROBAXIN) 750 MG Tab TAKE 1 TABLET BY MOUTH 4 TIMES DAILY AS NEEDED 30 tablet 4    nystatin-triamcinolone (MYCOLOG II) cream Apply 1 each topically.      [DISCONTINUED]  "allopurinoL (ZYLOPRIM) 100 MG tablet Take 1 tablet (100 mg total) by mouth once daily. 90 tablet 1    [DISCONTINUED] atenoloL-chlorthalidone (TENORETIC) 50-25 mg Tab Take 1 tablet by mouth once daily. 90 tablet 1    [DISCONTINUED] EUTHYROX 75 mcg tablet Take 1 tablet by mouth once daily 90 tablet 0    [DISCONTINUED] oxaprozin (DAYPRO) 600 mg tablet Take 1 tablet (600 mg total) by mouth 2 (two) times daily as needed (pain). 60 tablet 5    allopurinoL (ZYLOPRIM) 100 MG tablet Take 1 tablet (100 mg total) by mouth once daily. 90 tablet 2    atenoloL-chlorthalidone (TENORETIC) 50-25 mg Tab Take 1 tablet by mouth once daily. 90 tablet 2    gabapentin (NEURONTIN) 100 MG capsule Take 3 capsules (300 mg total) by mouth daily as needed (pain). 90 capsule 1    levothyroxine (EUTHYROX) 75 MCG tablet Take 1 tablet (75 mcg total) by mouth once daily. 90 tablet 2    oxaprozin (DAYPRO) 600 mg tablet Take 1 tablet (600 mg total) by mouth 2 (two) times daily as needed (pain). 60 tablet 5     No facility-administered encounter medications on file as of 8/30/2022.       Allergies:  Review of patient's allergies indicates:  No Known Allergies    Health Maintenance:    There is no immunization history on file for this patient.   Health Maintenance   Topic Date Due    TETANUS VACCINE  08/30/2023 (Originally 9/5/1978)    Mammogram  06/23/2023    Lipid Panel  02/16/2027    Hepatitis C Screening  Completed        Physical Exam      Vital Signs  Temp: 97.7 °F (36.5 °C)  Temp src: Oral  Pulse: 88  Resp: 18  SpO2: 96 %  BP: 138/80  BP Location: Left arm  Patient Position: Sitting  Height and Weight  Height: 5' 7" (170.2 cm)  Weight: 136.1 kg (300 lb)  BSA (Calculated - sq m): 2.54 sq meters  BMI (Calculated): 47  Weight in (lb) to have BMI = 25: 159.3]    Physical Exam  Vitals and nursing note reviewed.   Constitutional:       General: She is not in acute distress.     Appearance: Normal appearance. She is obese.   HENT:      Head: " Normocephalic and atraumatic.      Right Ear: External ear normal.      Left Ear: External ear normal.   Eyes:      Conjunctiva/sclera: Conjunctivae normal.      Pupils: Pupils are equal, round, and reactive to light.   Cardiovascular:      Rate and Rhythm: Normal rate and regular rhythm.      Heart sounds: Normal heart sounds. No murmur heard.  Pulmonary:      Effort: Pulmonary effort is normal. No respiratory distress.      Breath sounds: Normal breath sounds.   Musculoskeletal:      Right lower leg: No edema.      Left lower leg: No edema.   Skin:     General: Skin is warm and dry.   Neurological:      General: No focal deficit present.      Mental Status: She is alert and oriented to person, place, and time. Mental status is at baseline.   Psychiatric:         Mood and Affect: Mood normal.         Behavior: Behavior normal.         Thought Content: Thought content normal.         Judgment: Judgment normal.        Laboratory:  CBC:      CMP:  Recent Labs   Lab 05/18/21  1638 02/16/22  0914   Glucose 90 93   Calcium 8.8 9.0   Albumin 3.6 3.7   Total Protein 6.7 6.7   Sodium 144 142   Potassium 4.3 4.4   CO2 33 H 33 H   Chloride 107 105   BUN 9 17   Alk Phos 58 63   ALT 19 20   AST 15 15   Bilirubin, Total 0.7 0.6       LIPIDS:  Recent Labs   Lab 05/18/21  1638 02/16/22  0914   TSH 4.080 H 3.210   HDL Cholesterol  --  75 H   Cholesterol  --  184   Triglycerides  --  47   LDL Calculated  --  100   Cholesterol/HDL Ratio (Risk Factor)  --  2.5   Non-HDL  --  109       TSH:  Recent Labs   Lab 05/18/21  1638 02/16/22  0914   TSH 4.080 H 3.210       A1C:        Assessment/Plan     Angela Landaverde is a 61 y.o.female with:    1. Essential hypertension  - atenoloL-chlorthalidone (TENORETIC) 50-25 mg Tab; Take 1 tablet by mouth once daily.  Dispense: 90 tablet; Refill: 2  - Comprehensive Metabolic Panel; Future  - Comprehensive Metabolic Panel    2. Gout, unspecified cause, unspecified chronicity, unspecified site  - allopurinoL  (ZYLOPRIM) 100 MG tablet; Take 1 tablet (100 mg total) by mouth once daily.  Dispense: 90 tablet; Refill: 2  - Uric Acid; Future  - Uric Acid    3. Hypothyroidism, unspecified type  - levothyroxine (EUTHYROX) 75 MCG tablet; Take 1 tablet (75 mcg total) by mouth once daily.  Dispense: 90 tablet; Refill: 2  - T4, Free; Future  - TSH; Future  - T4, Free  - TSH    4. Arthritis  - oxaprozin (DAYPRO) 600 mg tablet; Take 1 tablet (600 mg total) by mouth 2 (two) times daily as needed (pain).  Dispense: 60 tablet; Refill: 5    5. Arthralgia, unspecified joint  - ESDRAS EIA w/ Reflex to dsDNA/GARRY; Future  - Rheumatoid Factor Screen; Future  - Cyclic Citrullinated Peptide Antibody, IgG; Future  - Sedimentation Rate; Future  - C-Reactive Protein; Future  - ESDRAS EIA w/ Reflex to dsDNA/GARRY  - Rheumatoid Factor Screen  - Cyclic Citrullinated Peptide Antibody, IgG  - Sedimentation Rate  - C-Reactive Protein       Chronic conditions status updated as per HPI.  Other than changes above, cont current medications and maintain follow up with specialists.  Return to clinic in 6 months.    Joana Parmar DO  [facility]

## 2022-08-31 DIAGNOSIS — E03.9 HYPOTHYROIDISM, UNSPECIFIED TYPE: ICD-10-CM

## 2022-08-31 RX ORDER — LEVOTHYROXINE SODIUM 88 UG/1
75 TABLET ORAL
Qty: 30 TABLET | Refills: 1 | Status: SHIPPED | OUTPATIENT
Start: 2022-08-31 | End: 2022-11-09 | Stop reason: SDUPTHER

## 2022-09-01 ENCOUNTER — TELEPHONE (OUTPATIENT)
Dept: FAMILY MEDICINE | Facility: CLINIC | Age: 62
End: 2022-09-01
Payer: COMMERCIAL

## 2022-09-01 LAB — ANA SER QL: NEGATIVE

## 2022-09-01 NOTE — TELEPHONE ENCOUNTER
----- Message from Joana Parmar DO sent at 8/31/2022  1:57 PM CDT -----  So far, the patient's arthritis panel is negative.  I am still waiting for 2 results and will contact the patient if the results are abnormal.  The patient's thyroid labs show that we could increase her levothyroxine.  Discontinue levothyroxine 75 micrograms daily.  New Rx for levothyroxine 88 micrograms daily sent to patient's pharmacy.  Return to the clinic in 6 weeks for lab only to repeat thyroid labs.  
good, to achieve stated therapy goals

## 2022-09-02 LAB — CCP AB SER IA-ACNC: <16 UNITS

## 2022-09-07 ENCOUNTER — HOSPITAL ENCOUNTER (OUTPATIENT)
Dept: RADIOLOGY | Facility: HOSPITAL | Age: 62
Discharge: HOME | End: 2022-09-07
Attending: FAMILY MEDICINE
Payer: COMMERCIAL

## 2022-09-07 ENCOUNTER — OFFICE VISIT (OUTPATIENT)
Dept: FAMILY MEDICINE | Facility: CLINIC | Age: 62
End: 2022-09-07
Payer: COMMERCIAL

## 2022-09-07 VITALS
SYSTOLIC BLOOD PRESSURE: 137 MMHG | HEART RATE: 65 BPM | DIASTOLIC BLOOD PRESSURE: 90 MMHG | BODY MASS INDEX: 45.99 KG/M2 | TEMPERATURE: 98 F | RESPIRATION RATE: 20 BRPM | WEIGHT: 293 LBS | OXYGEN SATURATION: 97 % | HEIGHT: 67 IN

## 2022-09-07 DIAGNOSIS — M79.604 RIGHT LEG PAIN: ICD-10-CM

## 2022-09-07 DIAGNOSIS — M79.89 PAIN AND SWELLING OF LOWER LEG, UNSPECIFIED LATERALITY: ICD-10-CM

## 2022-09-07 DIAGNOSIS — M79.669 PAIN AND SWELLING OF LOWER LEG, UNSPECIFIED LATERALITY: ICD-10-CM

## 2022-09-07 DIAGNOSIS — M79.604 RIGHT LEG PAIN: Primary | ICD-10-CM

## 2022-09-07 DIAGNOSIS — R60.0 EDEMA, LOWER EXTREMITY: ICD-10-CM

## 2022-09-07 PROCEDURE — 73590 XR TIBIA FIBULA 2 VIEW RIGHT: ICD-10-PCS | Mod: 26,RT,, | Performed by: RADIOLOGY

## 2022-09-07 PROCEDURE — 3080F PR MOST RECENT DIASTOLIC BLOOD PRESSURE >= 90 MM HG: ICD-10-PCS | Mod: CPTII,GC,, | Performed by: FAMILY MEDICINE

## 2022-09-07 PROCEDURE — 73590 X-RAY EXAM OF LOWER LEG: CPT | Mod: 26,RT,, | Performed by: RADIOLOGY

## 2022-09-07 PROCEDURE — 3080F DIAST BP >= 90 MM HG: CPT | Mod: CPTII,GC,, | Performed by: FAMILY MEDICINE

## 2022-09-07 PROCEDURE — 99212 OFFICE O/P EST SF 10 MIN: CPT | Mod: GC,,, | Performed by: FAMILY MEDICINE

## 2022-09-07 PROCEDURE — 3008F BODY MASS INDEX DOCD: CPT | Mod: CPTII,GC,, | Performed by: FAMILY MEDICINE

## 2022-09-07 PROCEDURE — 3008F PR BODY MASS INDEX (BMI) DOCUMENTED: ICD-10-PCS | Mod: CPTII,GC,, | Performed by: FAMILY MEDICINE

## 2022-09-07 PROCEDURE — 99212 PR OFFICE/OUTPT VISIT, EST, LEVL II, 10-19 MIN: ICD-10-PCS | Mod: GC,,, | Performed by: FAMILY MEDICINE

## 2022-09-07 PROCEDURE — 3075F SYST BP GE 130 - 139MM HG: CPT | Mod: CPTII,GC,, | Performed by: FAMILY MEDICINE

## 2022-09-07 PROCEDURE — 3075F PR MOST RECENT SYSTOLIC BLOOD PRESS GE 130-139MM HG: ICD-10-PCS | Mod: CPTII,GC,, | Performed by: FAMILY MEDICINE

## 2022-09-07 PROCEDURE — 73590 X-RAY EXAM OF LOWER LEG: CPT | Mod: TC,RT

## 2022-09-07 NOTE — ASSESSMENT & PLAN NOTE
Right leg xray at [facility] for right chronic proximal leg (below knee) pain and mild swelling without warmth to ruleout cancer.  Patient took Tylenol, Gabapentin, Oxaprozin, and Naproxen without relief.  Patient will followup tomorrow for leg pain (she wants steroid shot but not given today) and to establish care.

## 2022-09-07 NOTE — PROGRESS NOTES
Subjective:       Patient ID: Angela Landaverde is a 62 y.o. female.    Chief Complaint: Knee Pain    Angela Landaverde is a 61yo AA female with a history of HLD, arthritis, gout, venous insufficiency, chronic leg pain and swelling who presents with leg pain. Patient reports a chronic history of bilateral leg pain and swelling and sees Dr. Parmar as her PCP who manages her symptoms. Patient has been on vacation the past week and started developing worsening localized pain and swelling in both legs below the knees for the past 7 days. Pain is described as sharp and rated 8/10 and worse on the right than left. Patient took Gabapentin, Tylenol, Naproxen, and Oxaprozin without relief. Patient reports that she gets episodes of current symptoms about 1-2 times a year. Patient would like have a steroid shot for her pain and she's also interested in a new PCP to management her pain and medical problems. Patient plans to return tomorrow as a followup and to establish care.        Current Outpatient Medications:     allopurinoL (ZYLOPRIM) 100 MG tablet, Take 1 tablet (100 mg total) by mouth once daily., Disp: 90 tablet, Rfl: 2    atenoloL-chlorthalidone (TENORETIC) 50-25 mg Tab, Take 1 tablet by mouth once daily., Disp: 90 tablet, Rfl: 2    diclofenac sodium (VOLTAREN) 1 % Gel, APPLY 2 GRAMS TO AFFECTED AREAS EVERY 6 HOURS AS NEEDED, Disp: , Rfl:     gabapentin (NEURONTIN) 100 MG capsule, Take 3 capsules (300 mg total) by mouth daily as needed (pain)., Disp: 90 capsule, Rfl: 1    levothyroxine (EUTHYROX) 88 MCG tablet, Take 1 tablet (88 mcg total) by mouth before breakfast., Disp: 30 tablet, Rfl: 1    methocarbamoL (ROBAXIN) 750 MG Tab, TAKE 1 TABLET BY MOUTH 4 TIMES DAILY AS NEEDED, Disp: 30 tablet, Rfl: 4    nystatin-triamcinolone (MYCOLOG II) cream, Apply 1 each topically., Disp: , Rfl:     oxaprozin (DAYPRO) 600 mg tablet, Take 1 tablet (600 mg total) by mouth 2 (two) times daily as needed (pain)., Disp: 60 tablet, Rfl: 5    Review  of patient's allergies indicates:  No Known Allergies    Past Medical History:   Diagnosis Date    Arthritis     Hypertension     Thyroid disease        Past Surgical History:   Procedure Laterality Date     SECTION      TUBAL LIGATION         Family History   Problem Relation Age of Onset    Cancer Mother     Heart disease Father     Cancer Brother     Cancer Brother        Social History     Tobacco Use    Smoking status: Never    Smokeless tobacco: Never   Substance Use Topics    Alcohol use: Not Currently     Comment: occasionally    Drug use: Never       Review of Systems   Constitutional:  Negative for chills, diaphoresis, fatigue and fever.   HENT:  Negative for drooling, hearing loss and rhinorrhea.    Eyes:  Negative for discharge and redness.   Respiratory:  Negative for cough and shortness of breath.    Cardiovascular:  Negative for chest pain.   Gastrointestinal:  Negative for abdominal pain.   Genitourinary:  Negative for difficulty urinating.   Musculoskeletal:  Positive for leg pain. Negative for joint deformity.        Bilateral leg (below knee) swelling and pain   Integumentary:  Negative for rash and wound.   Neurological:  Negative for facial asymmetry and speech difficulty.   Psychiatric/Behavioral:  Negative for agitation and behavioral problems.        Current Medications:   Medication List with Changes/Refills   Current Medications    ALLOPURINOL (ZYLOPRIM) 100 MG TABLET    Take 1 tablet (100 mg total) by mouth once daily.       Start Date: 2022 End Date: --    ATENOLOL-CHLORTHALIDONE (TENORETIC) 50-25 MG TAB    Take 1 tablet by mouth once daily.       Start Date: 2022 End Date: --    DICLOFENAC SODIUM (VOLTAREN) 1 % GEL    APPLY 2 GRAMS TO AFFECTED AREAS EVERY 6 HOURS AS NEEDED       Start Date: 2020End Date: --    GABAPENTIN (NEURONTIN) 100 MG CAPSULE    Take 3 capsules (300 mg total) by mouth daily as needed (pain).       Start Date: 2022 End Date: 2023     "LEVOTHYROXINE (EUTHYROX) 88 MCG TABLET    Take 1 tablet (88 mcg total) by mouth before breakfast.       Start Date: 8/31/2022 End Date: --    METHOCARBAMOL (ROBAXIN) 750 MG TAB    TAKE 1 TABLET BY MOUTH 4 TIMES DAILY AS NEEDED       Start Date: 2/16/2022 End Date: --    NYSTATIN-TRIAMCINOLONE (MYCOLOG II) CREAM    Apply 1 each topically.       Start Date: 6/21/2022 End Date: --    OXAPROZIN (DAYPRO) 600 MG TABLET    Take 1 tablet (600 mg total) by mouth 2 (two) times daily as needed (pain).       Start Date: 8/30/2022 End Date: --            Objective:        Vitals:    09/07/22 1014   BP: (!) 137/90   BP Location: Right arm   Patient Position: Sitting   BP Method: Large (Automatic)   Pulse: 65   Resp: 20   Temp: 97.9 °F (36.6 °C)   TempSrc: Oral   SpO2: 97%   Weight: 135.2 kg (298 lb)   Height: 5' 7" (1.702 m)       Physical Exam  Vitals and nursing note reviewed.   Constitutional:       General: She is not in acute distress.     Appearance: Normal appearance. She is obese. She is not ill-appearing, toxic-appearing or diaphoretic.   HENT:      Head: Normocephalic and atraumatic.      Right Ear: External ear normal.      Left Ear: External ear normal.      Nose: Nose normal.      Mouth/Throat:      Pharynx: Oropharynx is clear.   Eyes:      General: No scleral icterus.        Right eye: No discharge.         Left eye: No discharge.      Conjunctiva/sclera: Conjunctivae normal.   Cardiovascular:      Rate and Rhythm: Normal rate and regular rhythm.      Pulses: Normal pulses.      Heart sounds: Normal heart sounds.   Pulmonary:      Effort: Pulmonary effort is normal. No respiratory distress.      Breath sounds: Normal breath sounds. No wheezing.   Abdominal:      General: Abdomen is flat.   Musculoskeletal:         General: Tenderness present.      Cervical back: Neck supple.      Right lower leg: Edema present.      Left lower leg: Edema present.      Comments: Mild swelling and TTP bilateral leg (below knee) R>L "   Skin:     General: Skin is warm.      Coloration: Skin is not jaundiced.      Findings: No rash.   Neurological:      General: No focal deficit present.      Mental Status: She is alert.      Gait: Gait normal.   Psychiatric:         Mood and Affect: Mood normal.         Behavior: Behavior normal.           Lab Results   Component Value Date    CHOL 184 02/16/2022    TRIG 47 02/16/2022    HDL 75 (H) 02/16/2022    ALT 24 08/30/2022    AST 20 08/30/2022     08/30/2022    K 4.5 08/30/2022     08/30/2022    CREATININE 0.86 08/30/2022    BUN 14 08/30/2022    CO2 31 08/30/2022    TSH 4.440 (H) 08/30/2022      Assessment:       1. Right leg pain    2. Pain and swelling of lower leg, unspecified laterality    3. Edema, lower extremity          Plan:         Problem List Items Addressed This Visit          Orthopedic    Pain and swelling of lower leg     Right leg xray at [facility] for right chronic proximal leg (below knee) pain and mild swelling without warmth to ruleout cancer.  Patient took Tylenol, Gabapentin, Oxaprozin, and Naproxen without relief.  Patient will followup tomorrow for leg pain (she wants steroid shot but not given today) and to establish care.            Other    Edema, lower extremity     See pain and swelling of lower leg          Other Visit Diagnoses       Right leg pain    -  Primary    Relevant Orders    X-Ray Tibia Fibula 2 View Right (Completed)              Follow up in about 1 day (around 9/8/2022) for establish care and leg pain.    Zully Mena DO     Instructed patient that if symptoms fail to improve or worsen patient should seek immediate medical attention or report to the nearest emergency department. Patient expressed verbal agreement and understanding to this plan of care.

## 2022-09-08 ENCOUNTER — TELEPHONE (OUTPATIENT)
Dept: FAMILY MEDICINE | Facility: CLINIC | Age: 62
End: 2022-09-08
Payer: COMMERCIAL

## 2022-09-08 ENCOUNTER — OFFICE VISIT (OUTPATIENT)
Dept: FAMILY MEDICINE | Facility: CLINIC | Age: 62
End: 2022-09-08
Payer: COMMERCIAL

## 2022-09-08 VITALS
SYSTOLIC BLOOD PRESSURE: 152 MMHG | TEMPERATURE: 98 F | RESPIRATION RATE: 20 BRPM | DIASTOLIC BLOOD PRESSURE: 74 MMHG | BODY MASS INDEX: 45.99 KG/M2 | WEIGHT: 293 LBS | HEART RATE: 68 BPM | OXYGEN SATURATION: 97 % | HEIGHT: 67 IN

## 2022-09-08 DIAGNOSIS — M79.661 PAIN AND SWELLING OF RIGHT LOWER LEG: Primary | ICD-10-CM

## 2022-09-08 DIAGNOSIS — M77.31 CALCANEAL SPUR OF FOOT, RIGHT: ICD-10-CM

## 2022-09-08 DIAGNOSIS — M79.89 PAIN AND SWELLING OF RIGHT LOWER LEG: Primary | ICD-10-CM

## 2022-09-08 DIAGNOSIS — M77.31 CALCANEAL SPUR OF RIGHT FOOT: ICD-10-CM

## 2022-09-08 PROCEDURE — 99213 OFFICE O/P EST LOW 20 MIN: CPT | Mod: ,,, | Performed by: FAMILY MEDICINE

## 2022-09-08 PROCEDURE — 3008F PR BODY MASS INDEX (BMI) DOCUMENTED: ICD-10-PCS | Mod: CPTII,,, | Performed by: FAMILY MEDICINE

## 2022-09-08 PROCEDURE — 1159F PR MEDICATION LIST DOCUMENTED IN MEDICAL RECORD: ICD-10-PCS | Mod: CPTII,,, | Performed by: FAMILY MEDICINE

## 2022-09-08 PROCEDURE — 3077F SYST BP >= 140 MM HG: CPT | Mod: CPTII,,, | Performed by: FAMILY MEDICINE

## 2022-09-08 PROCEDURE — 3078F PR MOST RECENT DIASTOLIC BLOOD PRESSURE < 80 MM HG: ICD-10-PCS | Mod: CPTII,,, | Performed by: FAMILY MEDICINE

## 2022-09-08 PROCEDURE — 3078F DIAST BP <80 MM HG: CPT | Mod: CPTII,,, | Performed by: FAMILY MEDICINE

## 2022-09-08 PROCEDURE — 99213 PR OFFICE/OUTPT VISIT, EST, LEVL III, 20-29 MIN: ICD-10-PCS | Mod: ,,, | Performed by: FAMILY MEDICINE

## 2022-09-08 PROCEDURE — 1159F MED LIST DOCD IN RCRD: CPT | Mod: CPTII,,, | Performed by: FAMILY MEDICINE

## 2022-09-08 PROCEDURE — 3008F BODY MASS INDEX DOCD: CPT | Mod: CPTII,,, | Performed by: FAMILY MEDICINE

## 2022-09-08 PROCEDURE — 3077F PR MOST RECENT SYSTOLIC BLOOD PRESSURE >= 140 MM HG: ICD-10-PCS | Mod: CPTII,,, | Performed by: FAMILY MEDICINE

## 2022-09-08 NOTE — PROGRESS NOTES
I have reviewed the notes, assessments, and/or procedures performed by DR SHEETS, I concur with his documentation of Angela Walker.

## 2022-09-08 NOTE — ASSESSMENT & PLAN NOTE
Orthopedic referral attempted - they denied on 9/9/2022 and said to consult podiatry   Calcaneal spurring seen on X-ray yesterday  Patient complains of pain on plantar surface of right foot

## 2022-09-08 NOTE — PROGRESS NOTES
Subjective:       Patient ID: Angela Landaverde is a 62 y.o. female.    Chief Complaint: Knee Pain (right) and Follow-up    Patient is a 62 year old female that presents to the clinic today for follow up after x-rays of right leg for swelling and pain inferior to the right knee and right above the ankle on the anterolateral surface. She has a PMH of HTN, HLD, gout, venous insufficiency and arthritis. The patient has localized swelling and pain on the right lower leg on the anterior surface below the knee and also localized to above the right ankle joint. She states no trauma occurred and this slowly developed over the past several months. She states there is no change and different times of the day and there is nothing that improves the swelling and pain. It is worse if she has to walk a lot. Right lower extremity x-rays were performed yesterday that showed several ovoid calcific densities are demonstrated within the ventral soft tissues, greatest inferiorly which are nonspecific but may reflect phleboliths.  Plantar and posterior calcaneal spurring. The patient was given a referral the the [facility] to address the phleboliths and a referral to orthopedics to address the calcaneal spurring on the planter surface of her right foot that gives her discomfort. The patient was encouraged to lose weight, as obesity complicates all aspects of health. She understood and agreed to follow up in 6 to 8 weeks after her specialty appointments.       Current Outpatient Medications:     allopurinoL (ZYLOPRIM) 100 MG tablet, Take 1 tablet (100 mg total) by mouth once daily., Disp: 90 tablet, Rfl: 2    atenoloL-chlorthalidone (TENORETIC) 50-25 mg Tab, Take 1 tablet by mouth once daily., Disp: 90 tablet, Rfl: 2    diclofenac sodium (VOLTAREN) 1 % Gel, APPLY 2 GRAMS TO AFFECTED AREAS EVERY 6 HOURS AS NEEDED, Disp: , Rfl:     gabapentin (NEURONTIN) 100 MG capsule, Take 3 capsules (300 mg total) by mouth daily as needed (pain)., Disp:  90 capsule, Rfl: 1    levothyroxine (EUTHYROX) 88 MCG tablet, Take 1 tablet (88 mcg total) by mouth before breakfast., Disp: 30 tablet, Rfl: 1    methocarbamoL (ROBAXIN) 750 MG Tab, TAKE 1 TABLET BY MOUTH 4 TIMES DAILY AS NEEDED, Disp: 30 tablet, Rfl: 4    nystatin-triamcinolone (MYCOLOG II) cream, Apply 1 each topically., Disp: , Rfl:     oxaprozin (DAYPRO) 600 mg tablet, Take 1 tablet (600 mg total) by mouth 2 (two) times daily as needed (pain)., Disp: 60 tablet, Rfl: 5    Review of patient's allergies indicates:  No Known Allergies    Past Medical History:   Diagnosis Date    Arthritis     Hypertension     Thyroid disease        Past Surgical History:   Procedure Laterality Date     SECTION      TUBAL LIGATION         Family History   Problem Relation Age of Onset    Cancer Mother     Heart disease Father     Cancer Brother     Cancer Brother        Social History     Tobacco Use    Smoking status: Never    Smokeless tobacco: Never   Substance Use Topics    Alcohol use: Not Currently     Comment: occasionally    Drug use: Never       Review of Systems   Constitutional:  Negative for activity change, appetite change, diaphoresis, fatigue and fever.   HENT:  Negative for ear pain, facial swelling, hearing loss and tinnitus.    Eyes:  Negative for pain, redness and eye dryness.   Respiratory:  Negative for cough, chest tightness and shortness of breath.    Cardiovascular:  Positive for leg swelling. Negative for chest pain.   Gastrointestinal:  Negative for abdominal distention and abdominal pain.   Genitourinary:  Negative for flank pain and pelvic pain.   Integumentary:  Negative for color change, rash and mole/lesion.   Neurological:  Negative for speech difficulty, weakness, light-headedness, numbness and memory loss.   Psychiatric/Behavioral:  Negative for agitation, behavioral problems, decreased concentration and hallucinations. The patient is not hyperactive.        Current Medications:   Medication  "List with Changes/Refills   Current Medications    ALLOPURINOL (ZYLOPRIM) 100 MG TABLET    Take 1 tablet (100 mg total) by mouth once daily.       Start Date: 8/30/2022 End Date: --    ATENOLOL-CHLORTHALIDONE (TENORETIC) 50-25 MG TAB    Take 1 tablet by mouth once daily.       Start Date: 8/30/2022 End Date: --    DICLOFENAC SODIUM (VOLTAREN) 1 % GEL    APPLY 2 GRAMS TO AFFECTED AREAS EVERY 6 HOURS AS NEEDED       Start Date: 12/29/2020End Date: --    GABAPENTIN (NEURONTIN) 100 MG CAPSULE    Take 3 capsules (300 mg total) by mouth daily as needed (pain).       Start Date: 8/30/2022 End Date: 8/30/2023    LEVOTHYROXINE (EUTHYROX) 88 MCG TABLET    Take 1 tablet (88 mcg total) by mouth before breakfast.       Start Date: 8/31/2022 End Date: --    METHOCARBAMOL (ROBAXIN) 750 MG TAB    TAKE 1 TABLET BY MOUTH 4 TIMES DAILY AS NEEDED       Start Date: 2/16/2022 End Date: --    NYSTATIN-TRIAMCINOLONE (MYCOLOG II) CREAM    Apply 1 each topically.       Start Date: 6/21/2022 End Date: --    OXAPROZIN (DAYPRO) 600 MG TABLET    Take 1 tablet (600 mg total) by mouth 2 (two) times daily as needed (pain).       Start Date: 8/30/2022 End Date: --            Objective:        Vitals:    09/08/22 0914   BP: (!) 152/74   BP Location: Right arm   Patient Position: Sitting   BP Method: Large (Automatic)   Pulse: 68   Resp: 20   Temp: 98.4 °F (36.9 °C)   TempSrc: Oral   SpO2: 97%   Weight: 135.4 kg (298 lb 9.6 oz)   Height: 5' 7" (1.702 m)       Physical Exam  Constitutional:       General: She is not in acute distress.     Appearance: Normal appearance. She is obese. She is not toxic-appearing.   HENT:      Head: Normocephalic and atraumatic.      Mouth/Throat:      Mouth: Mucous membranes are moist.      Pharynx: Oropharynx is clear.   Eyes:      Conjunctiva/sclera: Conjunctivae normal.   Cardiovascular:      Rate and Rhythm: Normal rate and regular rhythm.      Pulses: Normal pulses.      Heart sounds: Normal heart sounds. "   Pulmonary:      Effort: Pulmonary effort is normal.      Breath sounds: Normal breath sounds.   Abdominal:      General: There is no distension.      Palpations: Abdomen is soft.      Tenderness: There is no abdominal tenderness. There is no guarding.   Musculoskeletal:         General: Swelling and tenderness present. No signs of injury.      Right lower leg: Edema (anterior side only, not diffuse) present.        Legs:    Skin:     General: Skin is warm.   Neurological:      Mental Status: She is alert and oriented to person, place, and time.   Psychiatric:         Mood and Affect: Mood normal.         Behavior: Behavior normal.         Thought Content: Thought content normal.         Judgment: Judgment normal.           Lab Results   Component Value Date    CHOL 184 02/16/2022    TRIG 47 02/16/2022    HDL 75 (H) 02/16/2022    ALT 24 08/30/2022    AST 20 08/30/2022     08/30/2022    K 4.5 08/30/2022     08/30/2022    CREATININE 0.86 08/30/2022    BUN 14 08/30/2022    CO2 31 08/30/2022    TSH 4.440 (H) 08/30/2022      Assessment:       1. Pain and swelling of right lower leg    2. Calcaneal spur of right foot    3. Calcaneal spur of foot, right          Plan:         Problem List Items Addressed This Visit          Orthopedic    Pain and swelling of lower leg - Primary     Referral for vein specialist to address phleboliths seen on X-ray, right leg  Patient took Gabapentin, Tylenol, ibuprofen, oxaprozin without relief           Relevant Orders    Ambulatory referral/consult to [facility]    Calcaneal spur of foot, right     Orthopedic referral   Calcaneal spurring seen on X-ray yesterday  Patient complains of pain on plantar surface of right foot          Other Visit Diagnoses       Calcaneal spur of right foot        Relevant Orders    Ambulatory referral/consult to Orthopedics              Follow up in about 2 months (around 11/8/2022).    Shant Matta MD     Instructed patient that if  symptoms fail to improve or worsen patient should seek immediate medical attention or report to the nearest emergency department. Patient expressed verbal agreement and understanding to this plan of care.

## 2022-09-08 NOTE — ASSESSMENT & PLAN NOTE
Referral for vein specialist to address phleboliths seen on X-ray, right leg  Patient took Gabapentin, Tylenol, ibuprofen, oxaprozin without relief

## 2022-09-08 NOTE — TELEPHONE ENCOUNTER
----- Message from Joana Parmar DO sent at 8/31/2022  1:57 PM CDT -----  So far, the patient's arthritis panel is negative.  I am still waiting for 2 results and will contact the patient if the results are abnormal.  The patient's thyroid labs show that we could increase her levothyroxine.  Discontinue levothyroxine 75 micrograms daily.  New Rx for levothyroxine 88 micrograms daily sent to patient's pharmacy.  Return to the clinic in 6 weeks for lab only to repeat thyroid labs.

## 2022-09-08 NOTE — PROGRESS NOTES
X-ray results discussed with patient on the phone. Patient returned to clinic today as a followup to see Dr. Matta for right leg pain and was referred to [facility] to f/u with symptoms, OA, calcaneal spur, and potential phlebothliths seen on xray. Patient will f/u in clinic in about 2 months.

## 2022-09-20 ENCOUNTER — OFFICE VISIT (OUTPATIENT)
Dept: VASCULAR SURGERY | Facility: CLINIC | Age: 62
End: 2022-09-20
Payer: COMMERCIAL

## 2022-09-20 VITALS
DIASTOLIC BLOOD PRESSURE: 87 MMHG | WEIGHT: 293 LBS | HEART RATE: 84 BPM | SYSTOLIC BLOOD PRESSURE: 136 MMHG | RESPIRATION RATE: 20 BRPM | BODY MASS INDEX: 45.99 KG/M2 | HEIGHT: 67 IN

## 2022-09-20 DIAGNOSIS — M25.461 PAIN AND SWELLING OF RIGHT KNEE: ICD-10-CM

## 2022-09-20 DIAGNOSIS — M79.604 PAIN IN BOTH LOWER EXTREMITIES: Primary | ICD-10-CM

## 2022-09-20 DIAGNOSIS — M79.605 PAIN IN BOTH LOWER EXTREMITIES: Primary | ICD-10-CM

## 2022-09-20 DIAGNOSIS — M79.89 PAIN AND SWELLING OF RIGHT LOWER LEG: ICD-10-CM

## 2022-09-20 DIAGNOSIS — M25.561 PAIN AND SWELLING OF RIGHT KNEE: ICD-10-CM

## 2022-09-20 DIAGNOSIS — M79.604 PAIN OF RIGHT LOWER EXTREMITY: Primary | ICD-10-CM

## 2022-09-20 DIAGNOSIS — R60.0 LOWER EXTREMITY EDEMA: ICD-10-CM

## 2022-09-20 DIAGNOSIS — M79.661 PAIN AND SWELLING OF RIGHT LOWER LEG: ICD-10-CM

## 2022-09-20 DIAGNOSIS — R60.0 EDEMA OF BOTH LOWER EXTREMITIES: ICD-10-CM

## 2022-09-20 PROCEDURE — 3075F PR MOST RECENT SYSTOLIC BLOOD PRESS GE 130-139MM HG: ICD-10-PCS | Mod: CPTII,,, | Performed by: NURSE PRACTITIONER

## 2022-09-20 PROCEDURE — 99214 PR OFFICE/OUTPT VISIT, EST, LEVL IV, 30-39 MIN: ICD-10-PCS | Mod: S$PBB,,, | Performed by: NURSE PRACTITIONER

## 2022-09-20 PROCEDURE — 3008F PR BODY MASS INDEX (BMI) DOCUMENTED: ICD-10-PCS | Mod: CPTII,,, | Performed by: NURSE PRACTITIONER

## 2022-09-20 PROCEDURE — 3079F PR MOST RECENT DIASTOLIC BLOOD PRESSURE 80-89 MM HG: ICD-10-PCS | Mod: CPTII,,, | Performed by: NURSE PRACTITIONER

## 2022-09-20 PROCEDURE — 3079F DIAST BP 80-89 MM HG: CPT | Mod: CPTII,,, | Performed by: NURSE PRACTITIONER

## 2022-09-20 PROCEDURE — 3075F SYST BP GE 130 - 139MM HG: CPT | Mod: CPTII,,, | Performed by: NURSE PRACTITIONER

## 2022-09-20 PROCEDURE — 3008F BODY MASS INDEX DOCD: CPT | Mod: CPTII,,, | Performed by: NURSE PRACTITIONER

## 2022-09-20 PROCEDURE — 99213 OFFICE O/P EST LOW 20 MIN: CPT | Mod: PBBFAC | Performed by: NURSE PRACTITIONER

## 2022-09-20 PROCEDURE — 99214 OFFICE O/P EST MOD 30 MIN: CPT | Mod: S$PBB,,, | Performed by: NURSE PRACTITIONER

## 2022-09-20 NOTE — PROGRESS NOTES
VEIN CENTER CLINIC NOTE    Patient ID: Angela Landaverde is a 62 y.o. female.    I. HISTORY     Chief Complaint:   Chief Complaint   Patient presents with    Leg Pain    Leg Swelling     Exam room 4. NP RF  for leg pain/swelling        HPI: Angela Landaverde is a 62 y.o. female who is referred here today by Dr. Zavala for evaluation of right leg pain & swelling.  Symptoms are as described in the chart below and began 2-3 weeks ago.  Location is right leg from just above the knee to the upper calf. Symptoms are progressive at the end of the day.  History of venous interventions includes None.  No known family history of venous disease.  No history of DVT. No open ulcers.  She was seen in June 2021 by  and underwent a Reflux study that showed evidence of Left GSV reflux with significantly dilated veins. She was started on conservative management of 20-30 mmHg compression, therapeutic leg elevation and calf pumping exercises. She reports this helped with left leg symptoms and denies functionally impairing pain of the left leg. She continues to have significant swelling of both legs with Rt. > Lt. She says she is only here because of the right leg pain & swelling. The left is not bothering her at this time.No open ulcers. No history of DVT.         Venous Disease Medical Necessity Documentation Initial Visit Date:    9/20/2022 Return Check Date:    Have you ever had a rupture or bleed from a varicose vein in your leg(s)?              [] Yes  [x] No   [] Yes   [] No   Have you ever been diagnosed with phlebitis, cellulitis, or inflammation in the area of the varicose veins of  your leg(s)?  [] Yes  [x] No    [] Yes   [] No   Do you have darkened or inflamed skin on your legs?   [] Yes   [x] No   [] Yes   [] No   Do you have leg swelling?     [x] Yes   [] No   [] Yes   [] No   Do you have leg pain?   [x] Yes   [] No   [] Yes   [] No   If yes, describe the type of pain?    [x]   Stabbing [x]  Radiating [x]  Aching    [x]  Tightness []  Throbbing               [x]  Burning []  Cramping              Do you have leg discomfort?   [x] Yes   [] No   [] Yes   [] No   If yes, describe the type of discomfort?    [x]  Heaviness [x]  Fullness   []  Restlessness [x] Tired/Fatigued [] Itching              Have you ever worn compression hose?   [x] Yes, intermittently   [] No   [] Yes   [] No   If yes, how long?           Do you elevate your legs while sitting?   [x] Yes   [] No   [] Yes   [] No   Does venous disease (varicose veins, ulcers, skin changes, leg pain/swelling) interfere with your daily life?  If yes, check activities you are limited or unable to do.    []  Shower  [x]   Walk  []  Exercise  [] Play with children/grandchildren  []  Shop [] Work [x] Stand for any period of time  Sleep                               [x] Sitting for an extended period of time.           [x] Yes   [] No   [] Yes   [] No   Do you exercise/have you tried to exercise (i.e.  Walk our participate in a regular exercise routine)?  [] Yes  [x] No   [] Yes   [] No   BMI   47.7           Past Medical History:   Diagnosis Date    Arthritis     Hypertension     Thyroid disease         Past Surgical History:   Procedure Laterality Date     SECTION      TUBAL LIGATION         Social History     Tobacco Use   Smoking Status Never   Smokeless Tobacco Never         Current Outpatient Medications:     allopurinoL (ZYLOPRIM) 100 MG tablet, Take 1 tablet (100 mg total) by mouth once daily., Disp: 90 tablet, Rfl: 2    atenoloL-chlorthalidone (TENORETIC) 50-25 mg Tab, Take 1 tablet by mouth once daily., Disp: 90 tablet, Rfl: 2    diclofenac sodium (VOLTAREN) 1 % Gel, APPLY 2 GRAMS TO AFFECTED AREAS EVERY 6 HOURS AS NEEDED, Disp: , Rfl:     gabapentin (NEURONTIN) 100 MG capsule, Take 3 capsules (300 mg total) by mouth daily as needed (pain)., Disp: 90 capsule, Rfl: 1    levothyroxine (EUTHYROX) 88 MCG tablet, Take 1 tablet (88 mcg total) by mouth before breakfast.,  Disp: 30 tablet, Rfl: 1    methocarbamoL (ROBAXIN) 750 MG Tab, TAKE 1 TABLET BY MOUTH 4 TIMES DAILY AS NEEDED, Disp: 30 tablet, Rfl: 4    nystatin-triamcinolone (MYCOLOG II) cream, Apply 1 each topically., Disp: , Rfl:     oxaprozin (DAYPRO) 600 mg tablet, Take 1 tablet (600 mg total) by mouth 2 (two) times daily as needed (pain)., Disp: 60 tablet, Rfl: 5    Review of Systems   Constitutional:  Negative for fatigue and fever.   Eyes:  Negative for pain.   Respiratory:  Negative for chest tightness and shortness of breath.    Cardiovascular:  Positive for leg swelling. Negative for chest pain.   Gastrointestinal:  Negative for abdominal pain.   Musculoskeletal:  Positive for leg pain.   Neurological:  Negative for syncope.   Psychiatric/Behavioral:  Negative for sleep disturbance.         II. PHYSICAL EXAM     Physical Exam  Vitals reviewed.   Constitutional:       General: She is not in acute distress.     Appearance: She is obese.   HENT:      Head: Normocephalic.   Eyes:      Pupils: Pupils are equal, round, and reactive to light.   Cardiovascular:      Rate and Rhythm: Normal rate and regular rhythm.   Pulmonary:      Effort: Pulmonary effort is normal.   Abdominal:      Palpations: Abdomen is soft.   Musculoskeletal:         General: No swelling. Normal range of motion.      Cervical back: Normal range of motion.      Right lower leg: Edema present.      Left lower leg: Edema present.      Comments: Significant BLE edema with Rt. > Lt.   Noted increased edema from just above the right knee , around the right knee and upper right calf region.      Skin:     General: Skin is warm and dry.      Comments: No increased warmth or erythema noted in area of swelling.    Neurological:      Mental Status: She is alert and oriented to person, place, and time.       Reticular/Spider veins noted:  RLE: none  LLE: none    Varicose veins noted:  RLE: none  LLE:  none    CEAP Classification     Venous Clinical Severity Score      III. ASSESSMENT & PLAN (MEDICAL DECISION MAKING)     1. Pain of right lower extremity    2. Pain and swelling of right lower leg    3. Edema of both lower extremities    4. Pain and swelling of right knee        Assessment/Diagnosis and Plan:  Patient has complaints, symptoms and physical exam findings of chronic venous disease.  She has known CVI of the left GSV ; however, she denies pain on the left. She continues to have significant BLE edema with complaints of right leg pain. Increased right leg edema x 2-3 weeks with pain. Our US tech is not available today ; he is with Dr. Novoa in [facility] there. Will have to schedule complete reflux study.   Noted significant edema surrounding the right knee and just above and below the knee. She also has been referred to Ortho for evaluation but hasn't seen them yet.   Will order a right leg complete venous reflux study and see the patient back with results.             Tanisha Hussein, MAKAYLA

## 2022-09-29 ENCOUNTER — HOSPITAL ENCOUNTER (OUTPATIENT)
Dept: RADIOLOGY | Facility: HOSPITAL | Age: 62
Discharge: HOME | End: 2022-09-29
Attending: NURSE PRACTITIONER
Payer: COMMERCIAL

## 2022-09-29 ENCOUNTER — OFFICE VISIT (OUTPATIENT)
Dept: VASCULAR SURGERY | Facility: CLINIC | Age: 62
End: 2022-09-29
Payer: COMMERCIAL

## 2022-09-29 VITALS
DIASTOLIC BLOOD PRESSURE: 80 MMHG | WEIGHT: 293 LBS | HEART RATE: 66 BPM | RESPIRATION RATE: 18 BRPM | HEIGHT: 67 IN | SYSTOLIC BLOOD PRESSURE: 126 MMHG | BODY MASS INDEX: 45.99 KG/M2

## 2022-09-29 DIAGNOSIS — M79.604 LEG PAIN, BILATERAL: ICD-10-CM

## 2022-09-29 DIAGNOSIS — L81.9 HYPERPIGMENTATION OF SKIN: Primary | ICD-10-CM

## 2022-09-29 DIAGNOSIS — M79.604 PAIN IN BOTH LOWER EXTREMITIES: ICD-10-CM

## 2022-09-29 DIAGNOSIS — M79.605 PAIN IN BOTH LOWER EXTREMITIES: ICD-10-CM

## 2022-09-29 DIAGNOSIS — R60.0 EDEMA, LOWER EXTREMITY: ICD-10-CM

## 2022-09-29 DIAGNOSIS — I87.2 VENOUS INSUFFICIENCY: ICD-10-CM

## 2022-09-29 DIAGNOSIS — R60.0 LOWER EXTREMITY EDEMA: ICD-10-CM

## 2022-09-29 DIAGNOSIS — M79.605 LEG PAIN, BILATERAL: ICD-10-CM

## 2022-09-29 PROCEDURE — 3074F PR MOST RECENT SYSTOLIC BLOOD PRESSURE < 130 MM HG: ICD-10-PCS | Mod: CPTII,,, | Performed by: FAMILY MEDICINE

## 2022-09-29 PROCEDURE — 3074F SYST BP LT 130 MM HG: CPT | Mod: CPTII,,, | Performed by: FAMILY MEDICINE

## 2022-09-29 PROCEDURE — 99214 OFFICE O/P EST MOD 30 MIN: CPT | Mod: PBBFAC,25 | Performed by: FAMILY MEDICINE

## 2022-09-29 PROCEDURE — 3079F DIAST BP 80-89 MM HG: CPT | Mod: CPTII,,, | Performed by: FAMILY MEDICINE

## 2022-09-29 PROCEDURE — 93970 US VENOUS REFLUX STUDY BILATERAL: ICD-10-PCS | Mod: 26,,, | Performed by: FAMILY MEDICINE

## 2022-09-29 PROCEDURE — 1159F MED LIST DOCD IN RCRD: CPT | Mod: CPTII,,, | Performed by: FAMILY MEDICINE

## 2022-09-29 PROCEDURE — 99214 OFFICE O/P EST MOD 30 MIN: CPT | Mod: S$PBB,,, | Performed by: FAMILY MEDICINE

## 2022-09-29 PROCEDURE — 1160F PR REVIEW ALL MEDS BY PRESCRIBER/CLIN PHARMACIST DOCUMENTED: ICD-10-PCS | Mod: CPTII,,, | Performed by: FAMILY MEDICINE

## 2022-09-29 PROCEDURE — 1160F RVW MEDS BY RX/DR IN RCRD: CPT | Mod: CPTII,,, | Performed by: FAMILY MEDICINE

## 2022-09-29 PROCEDURE — 1159F PR MEDICATION LIST DOCUMENTED IN MEDICAL RECORD: ICD-10-PCS | Mod: CPTII,,, | Performed by: FAMILY MEDICINE

## 2022-09-29 PROCEDURE — 3008F BODY MASS INDEX DOCD: CPT | Mod: CPTII,,, | Performed by: FAMILY MEDICINE

## 2022-09-29 PROCEDURE — 93970 EXTREMITY STUDY: CPT | Mod: TC

## 2022-09-29 PROCEDURE — 3008F PR BODY MASS INDEX (BMI) DOCUMENTED: ICD-10-PCS | Mod: CPTII,,, | Performed by: FAMILY MEDICINE

## 2022-09-29 PROCEDURE — 93970 EXTREMITY STUDY: CPT | Mod: 26,,, | Performed by: FAMILY MEDICINE

## 2022-09-29 PROCEDURE — 99214 PR OFFICE/OUTPT VISIT, EST, LEVL IV, 30-39 MIN: ICD-10-PCS | Mod: S$PBB,,, | Performed by: FAMILY MEDICINE

## 2022-09-29 PROCEDURE — 3079F PR MOST RECENT DIASTOLIC BLOOD PRESSURE 80-89 MM HG: ICD-10-PCS | Mod: CPTII,,, | Performed by: FAMILY MEDICINE

## 2022-09-29 NOTE — PROGRESS NOTES
VEIN CENTER CLINIC NOTE    Patient ID: Angela Landaverde is a 62 y.o. female.    I. HISTORY     Chief Complaint:   Chief Complaint   Patient presents with    Follow-up     Exam room 1.  Test results, bilateral complete reflux study.          HPI: Angela Landaverde is a 62 y.o. female who presents today for follow-up and results to a bilateral complete venous reflux study.  The patient was last seen on 09/20/2022 by referral from Dr. Zavala for evaluation of right leg pain & swelling.  Symptoms are as described in the chart below and began 2-3 weeks ago.  Location is right leg from just above the knee to the upper calf. Symptoms are progressive at the end of the day.  History of venous interventions includes None.  No known family history of venous disease.  No history of DVT. No open ulcers.  She was seen in June 2021 by  and underwent a Reflux study that showed evidence of Left GSV reflux with significantly dilated veins. She was started on conservative management of 20-30 mmHg compression, therapeutic leg elevation and calf pumping exercises. She reports this helped with left leg symptoms and denies functionally impairing pain of the left leg. She continues to have significant swelling of both legs with Rt. > Lt. She says she is only here because of the right leg pain & swelling. The left is not bothering her at this time.No open ulcers. No history of DVT.     Patient underwent a bilateral complete venous reflux study today 09/29/2022 and the results were discussed with the patient.  This study shows no evidence of DVT bilaterally.  The study also shows dilation reflux of the proximal left great saphenous vein.  No other superficial reflux noted.    Venous Disease Medical Necessity Documentation Initial Visit Date:    9/20/2022 Return Check Date:    Have you ever had a rupture or bleed from a varicose vein in your leg(s)?              [] Yes  [x] No   [] Yes   [] No   Have you ever been diagnosed with phlebitis,  cellulitis, or inflammation in the area of the varicose veins of  your leg(s)?  [] Yes  [x] No    [] Yes   [] No   Do you have darkened or inflamed skin on your legs?   [] Yes   [x] No   [] Yes   [] No   Do you have leg swelling?     [x] Yes   [] No   [] Yes   [] No   Do you have leg pain?   [x] Yes   [] No   [] Yes   [] No   If yes, describe the type of pain?    [x]   Stabbing [x]  Radiating [x]  Aching   [x]  Tightness []  Throbbing               [x]  Burning []  Cramping              Do you have leg discomfort?   [x] Yes   [] No   [] Yes   [] No   If yes, describe the type of discomfort?    [x]  Heaviness [x]  Fullness   []  Restlessness [x] Tired/Fatigued [] Itching              Have you ever worn compression hose?   [x] Yes, intermittently   [] No   [] Yes   [] No   If yes, how long?           Do you elevate your legs while sitting?   [x] Yes   [] No   [] Yes   [] No   Does venous disease (varicose veins, ulcers, skin changes, leg pain/swelling) interfere with your daily life?  If yes, check activities you are limited or unable to do.    []  Shower  [x]   Walk  []  Exercise  [] Play with children/grandchildren  []  Shop [] Work [x] Stand for any period of time  Sleep                               [x] Sitting for an extended period of time.           [x] Yes   [] No   [] Yes   [] No   Do you exercise/have you tried to exercise (i.e.  Walk our participate in a regular exercise routine)?  [] Yes  [x] No   [] Yes   [] No   BMI   47.7           Past Medical History:   Diagnosis Date    Arthritis     Hypertension     Thyroid disease         Past Surgical History:   Procedure Laterality Date     SECTION      TUBAL LIGATION         Social History     Tobacco Use   Smoking Status Never   Smokeless Tobacco Never         Current Outpatient Medications:     allopurinoL (ZYLOPRIM) 100 MG tablet, Take 1 tablet (100 mg total) by mouth once daily., Disp: 90 tablet, Rfl: 2    atenoloL-chlorthalidone (TENORETIC) 50-25 mg  Tab, Take 1 tablet by mouth once daily., Disp: 90 tablet, Rfl: 2    diclofenac sodium (VOLTAREN) 1 % Gel, APPLY 2 GRAMS TO AFFECTED AREAS EVERY 6 HOURS AS NEEDED, Disp: , Rfl:     gabapentin (NEURONTIN) 100 MG capsule, Take 3 capsules (300 mg total) by mouth daily as needed (pain)., Disp: 90 capsule, Rfl: 1    levothyroxine (EUTHYROX) 88 MCG tablet, Take 1 tablet (88 mcg total) by mouth before breakfast., Disp: 30 tablet, Rfl: 1    methocarbamoL (ROBAXIN) 750 MG Tab, TAKE 1 TABLET BY MOUTH 4 TIMES DAILY AS NEEDED, Disp: 30 tablet, Rfl: 4    nystatin-triamcinolone (MYCOLOG II) cream, Apply 1 each topically., Disp: , Rfl:     oxaprozin (DAYPRO) 600 mg tablet, Take 1 tablet (600 mg total) by mouth 2 (two) times daily as needed (pain)., Disp: 60 tablet, Rfl: 5    Review of Systems   Constitutional:  Negative for fatigue and fever.   Eyes:  Negative for pain.   Respiratory:  Negative for chest tightness and shortness of breath.    Cardiovascular:  Positive for leg swelling. Negative for chest pain.   Gastrointestinal:  Negative for abdominal pain.   Musculoskeletal:  Positive for leg pain.   Neurological:  Negative for syncope.   Psychiatric/Behavioral:  Negative for sleep disturbance.         II. PHYSICAL EXAM     Physical Exam  Vitals reviewed.   Constitutional:       General: She is not in acute distress.     Appearance: She is obese.   HENT:      Head: Normocephalic.   Eyes:      Pupils: Pupils are equal, round, and reactive to light.   Cardiovascular:      Rate and Rhythm: Normal rate and regular rhythm.   Pulmonary:      Effort: Pulmonary effort is normal.   Abdominal:      Palpations: Abdomen is soft.   Musculoskeletal:         General: No swelling. Normal range of motion.      Cervical back: Normal range of motion.      Right lower leg: Edema present.      Left lower leg: Edema present.      Comments: Significant BLE edema with Rt. > Lt.   Noted increased edema from just above the right knee , around the right  knee and upper right calf region.     Tenderness palpation of the right medial joint line, mild tenderness to palpation on the lateral joint line.     Skin:     General: Skin is warm and dry.      Comments: No increased warmth or erythema noted in area of swelling.    Neurological:      Mental Status: She is alert and oriented to person, place, and time.       Reticular/Spider veins noted:  RLE: none  LLE: none    Varicose veins noted:  RLE: none  LLE:  none    CEAP Classification  Clinical Signs: Class 4 - Skin changes ascribed to venous disease  Etiologic Classification: Primary  Anatomic distribution: Superficial  Pathophysiologic dysfunction: Reflux     Venous Clinical Severity Score  Pain:2=Daily, moderate activity limitation, occasional analgesics  Varicose Veins: 0=None  Venous Edema: 2=Afternoon edema, above ankle  Pigmentation: 1=Diffuse, but limited in area and old (brown)  Inflammation: 0=None  Induration: 0=None  Number of Active Ulcers: 0=0  Active Ulceration, Duration: 0=None  Active Ulcer Size: 0=None  Compressive Therapy: 1=Intermittent use of stockings  Total Score: 6     III. ASSESSMENT & PLAN (MEDICAL DECISION MAKING)     1. Hyperpigmentation of skin    2. Edema, lower extremity    3. Leg pain, bilateral    4. Venous insufficiency          Assessment/Diagnosis and Plan:  Ultrasound of lower extremities reveals positive evidence of venous insufficiency in the left proximal great saphenous vein.  Plan for conservative medical treatment at this time. The patient may benefit from endovenous ablation in the future.     - Start compression with 20-30mmHg Rx stockings  - Therapeutic leg elevation  - Calf pumping exercises  - RTC 3 months for further evaluation    Secondary to the patient's right knee pain we will refer the patient to orthopedics for further evaluation and treatment.        Gallito Novoa,

## 2022-09-30 DIAGNOSIS — M25.561 RIGHT KNEE PAIN, UNSPECIFIED CHRONICITY: Primary | ICD-10-CM

## 2022-10-12 ENCOUNTER — OFFICE VISIT (OUTPATIENT)
Dept: ORTHOPEDICS | Facility: CLINIC | Age: 62
End: 2022-10-12
Payer: COMMERCIAL

## 2022-10-12 ENCOUNTER — HOSPITAL ENCOUNTER (OUTPATIENT)
Dept: RADIOLOGY | Facility: HOSPITAL | Age: 62
Discharge: HOME | End: 2022-10-12
Attending: NURSE PRACTITIONER
Payer: COMMERCIAL

## 2022-10-12 DIAGNOSIS — M25.561 RIGHT KNEE PAIN, UNSPECIFIED CHRONICITY: ICD-10-CM

## 2022-10-12 DIAGNOSIS — M17.11 OSTEOARTHRITIS OF RIGHT KNEE, UNSPECIFIED OSTEOARTHRITIS TYPE: Primary | ICD-10-CM

## 2022-10-12 PROCEDURE — 1159F PR MEDICATION LIST DOCUMENTED IN MEDICAL RECORD: ICD-10-PCS | Mod: CPTII,,, | Performed by: NURSE PRACTITIONER

## 2022-10-12 PROCEDURE — 73564 X-RAY EXAM KNEE 4 OR MORE: CPT | Mod: 26,RT,, | Performed by: RADIOLOGY

## 2022-10-12 PROCEDURE — 20610 LARGE JOINT ASPIRATION/INJECTION: R SUPRA PATELLAR BURSA: ICD-10-PCS | Mod: RT,,, | Performed by: NURSE PRACTITIONER

## 2022-10-12 PROCEDURE — 73564 X-RAY EXAM KNEE 4 OR MORE: CPT | Mod: TC,RT

## 2022-10-12 PROCEDURE — 99213 PR OFFICE/OUTPT VISIT, EST, LEVL III, 20-29 MIN: ICD-10-PCS | Mod: 25,,, | Performed by: NURSE PRACTITIONER

## 2022-10-12 PROCEDURE — 20610 DRAIN/INJ JOINT/BURSA W/O US: CPT | Mod: RT,,, | Performed by: NURSE PRACTITIONER

## 2022-10-12 PROCEDURE — 99213 OFFICE O/P EST LOW 20 MIN: CPT | Mod: 25,,, | Performed by: NURSE PRACTITIONER

## 2022-10-12 PROCEDURE — 1159F MED LIST DOCD IN RCRD: CPT | Mod: CPTII,,, | Performed by: NURSE PRACTITIONER

## 2022-10-12 PROCEDURE — 73564 XR KNEE COMP 4 OR MORE VIEWS RIGHT: ICD-10-PCS | Mod: 26,RT,, | Performed by: RADIOLOGY

## 2022-10-12 RX ORDER — TRIAMCINOLONE ACETONIDE 40 MG/ML
40 INJECTION, SUSPENSION INTRA-ARTICULAR; INTRAMUSCULAR
Status: DISCONTINUED | OUTPATIENT
Start: 2022-10-12 | End: 2022-10-12 | Stop reason: HOSPADM

## 2022-10-12 RX ORDER — MELOXICAM 7.5 MG/1
7.5 TABLET ORAL DAILY
Qty: 30 TABLET | Refills: 2 | Status: SHIPPED | OUTPATIENT
Start: 2022-10-12 | End: 2023-09-05 | Stop reason: SDUPTHER

## 2022-10-12 RX ADMIN — TRIAMCINOLONE ACETONIDE 40 MG: 40 INJECTION, SUSPENSION INTRA-ARTICULAR; INTRAMUSCULAR at 09:10

## 2022-10-12 NOTE — PROGRESS NOTES
"      ASSESSMENT:      ICD-10-CM ICD-9-CM   1. Right knee pain, unspecified chronicity  M25.561 719.46       PLAN:     Findings and treatment options were discussed with the patient regarding the diagnosis.   All questions were answered regarding Angela Landaverde 's painful knee.      Natural history and expected course discussed. Questions answered. Educational materials distributed. Reduction in offending activity. Quad strengthening exercises. NSAIDs per medication orders. Arthrocentesis. See procedure note.  Mobic daily.  Right knee injection today.  Return to clinic with Dr. Flores in 3 months.    There are no Patient Instructions on file for this visit.    IMAGING:     FINDINGS:  No evidence of fracture seen.  The alignment of the joints appears normal.  Severe medial compartment and moderate remaining compartment degenerative change is present.  No soft tissue abnormality is seen.     Impression:     Knee osteoarthrosis as described above.        Electronically signed by: Darrick Kan  Date:                                            10/12/2022  Time:                                           09:43  CC: Knee pain    62 y.o. Female who presents as a new patient to me for evaluation of  bilateral knee pain and right foot pain.  Patient reports her right knee is the worst.  She has swelling and is very painful to walk.  She is ambulating with the use of a walker.  She denies any injury.  Pain does wake her at night.  She is currently trying to lose weight.  Discussed weight loss.  Occupation: dietary at [employer]  Pain has been present for several years, but has gotten worse lately ans is not getting any better.  Injury description no specific injury. Patient states that sometimes when she is walking she feels a "catch" in her knee.  Mechanical symptoms, such as clicking, locking, and popping are not present.    Swelling and effusions  are present.   The patient does not  describe symptoms of knee instability, such " as the knee buckling and the knee giving way.   Symptoms are worsened with activity.  Better with rest. Treatment thus far has included rest, activity modifications, and oral medications.    she  has not had formal physical therapy.  she has had prior injections injections into the knee. Dr. CRUZ did injections in both knees last year.   she has not had previous advanced imaging such as MRI.       She has pain on the bottom of her foot that she describes it as a sharp shooting pain.     Here today to discuss diagnosis and treatment options.           REVIEW OF SYSTEMS:   Constitution: Negative. Negative for chills, fever and night sweats.    Hematologic/Lymphatic: Negative for bleeding problem. Does not bruise/bleed easily.   Skin: Negative for dry skin, itching and rash.   Musculoskeletal: Negative for falls. Positive for knee pain and muscle weakness.     All other review of symptoms were reviewed and found to be noncontributory.     PAST MEDICAL HISTORY:   Past Medical History:   Diagnosis Date    Arthritis     Hypertension     Thyroid disease        PAST SURGICAL HISTORY:   Past Surgical History:   Procedure Laterality Date     SECTION      TUBAL LIGATION         FAMILY HISTORY:   Family History   Problem Relation Age of Onset    Cancer Mother     Heart disease Father     Cancer Brother     Cancer Brother        SOCIAL HISTORY:   Social History     Socioeconomic History    Marital status:    Tobacco Use    Smoking status: Never    Smokeless tobacco: Never   Substance and Sexual Activity    Alcohol use: Not Currently     Comment: occasionally    Drug use: Never    Sexual activity: Yes       MEDICATIONS:     Current Outpatient Medications:     allopurinoL (ZYLOPRIM) 100 MG tablet, Take 1 tablet (100 mg total) by mouth once daily., Disp: 90 tablet, Rfl: 2    atenoloL-chlorthalidone (TENORETIC) 50-25 mg Tab, Take 1 tablet by mouth once daily., Disp: 90 tablet, Rfl: 2    diclofenac  sodium (VOLTAREN) 1 % Gel, APPLY 2 GRAMS TO AFFECTED AREAS EVERY 6 HOURS AS NEEDED, Disp: , Rfl:     gabapentin (NEURONTIN) 100 MG capsule, Take 3 capsules (300 mg total) by mouth daily as needed (pain)., Disp: 90 capsule, Rfl: 1    levothyroxine (EUTHYROX) 88 MCG tablet, Take 1 tablet (88 mcg total) by mouth before breakfast., Disp: 30 tablet, Rfl: 1    methocarbamoL (ROBAXIN) 750 MG Tab, TAKE 1 TABLET BY MOUTH 4 TIMES DAILY AS NEEDED, Disp: 30 tablet, Rfl: 4    nystatin-triamcinolone (MYCOLOG II) cream, Apply 1 each topically., Disp: , Rfl:     oxaprozin (DAYPRO) 600 mg tablet, Take 1 tablet (600 mg total) by mouth 2 (two) times daily as needed (pain)., Disp: 60 tablet, Rfl: 5    ALLERGIES:   Review of patient's allergies indicates:  No Known Allergies     PHYSICAL EXAMINATION:  There were no vitals taken for this visit.  General    Constitutional: She is oriented to person, place, and time. She appears well-nourished.   HENT:   Head: Normocephalic and atraumatic.   Eyes: Pupils are equal, round, and reactive to light.   Neck: Neck supple.   Cardiovascular:  Normal rate and regular rhythm.            Pulmonary/Chest: Effort normal. No respiratory distress.   Abdominal: There is no abdominal tenderness. There is no guarding.   Neurological: She is alert and oriented to person, place, and time. She has normal reflexes.   Psychiatric: She has a normal mood and affect. Her behavior is normal. Judgment and thought content normal.           Right Knee Exam     Inspection   Swelling: present  Effusion: present    Tenderness   The patient is tender to palpation of the medial joint line and lateral joint line.    Crepitus   The patient has crepitus of the patella.    Range of Motion   Extension:  normal   Flexion:  abnormal     Tests   Meniscus   Dilan:  Medial - positive   Ligament Examination   Lachman: normal (-1 to 2mm)   PCL-Posterior Drawer: normal (0 to 2mm)     Patella   Patellar Tracking: normal  Patellar  Grind: positive    Other   Sensation: normal    Muscle Strength   Right Lower Extremity   Quadriceps:  5/5   Hamstrin/5     Reflexes     Right Side   Achilles:  2+  Quadriceps:  2+    Vascular Exam     Right Pulses  Dorsalis Pedis:      2+  Posterior Tibial:      2+        No orders of the defined types were placed in this encounter.      Large Joint Aspiration/Injection: R supra patellar bursa    Date/Time: 10/12/2022 9:00 AM  Performed by: MAKAYLA Guzmán  Authorized by: MAKAYLA Guzmán     Consent Done?:  Yes (Verbal)  Indications:  Pain  Site marked: the procedure site was marked    Local anesthetic:  Bupivacaine 0.25% without epinephrine    Details:  Needle Size:  22 G  Location:  Knee  Site:  R supra patellar bursa  Medications:  40 mg triamcinolone acetonide 40 mg/mL  Patient tolerance:  Patient tolerated the procedure well with no immediate complications

## 2022-10-13 ENCOUNTER — TELEPHONE (OUTPATIENT)
Dept: FAMILY MEDICINE | Facility: CLINIC | Age: 62
End: 2022-10-13
Payer: COMMERCIAL

## 2022-10-21 ENCOUNTER — TELEPHONE (OUTPATIENT)
Dept: ORTHOPEDICS | Facility: CLINIC | Age: 62
End: 2022-10-21
Payer: COMMERCIAL

## 2022-10-27 ENCOUNTER — OFFICE VISIT (OUTPATIENT)
Dept: FAMILY MEDICINE | Facility: CLINIC | Age: 62
End: 2022-10-27
Payer: COMMERCIAL

## 2022-10-27 VITALS
OXYGEN SATURATION: 96 % | TEMPERATURE: 98 F | HEART RATE: 70 BPM | DIASTOLIC BLOOD PRESSURE: 93 MMHG | WEIGHT: 293 LBS | HEIGHT: 67 IN | BODY MASS INDEX: 45.99 KG/M2 | SYSTOLIC BLOOD PRESSURE: 159 MMHG

## 2022-10-27 DIAGNOSIS — M25.561 RECURRENT PAIN OF RIGHT KNEE: ICD-10-CM

## 2022-10-27 PROCEDURE — 3080F PR MOST RECENT DIASTOLIC BLOOD PRESSURE >= 90 MM HG: ICD-10-PCS | Mod: CPTII,,, | Performed by: FAMILY MEDICINE

## 2022-10-27 PROCEDURE — 1159F MED LIST DOCD IN RCRD: CPT | Mod: CPTII,,, | Performed by: FAMILY MEDICINE

## 2022-10-27 PROCEDURE — 99213 PR OFFICE/OUTPT VISIT, EST, LEVL III, 20-29 MIN: ICD-10-PCS | Mod: ,,, | Performed by: FAMILY MEDICINE

## 2022-10-27 PROCEDURE — 3080F DIAST BP >= 90 MM HG: CPT | Mod: CPTII,,, | Performed by: FAMILY MEDICINE

## 2022-10-27 PROCEDURE — 99213 OFFICE O/P EST LOW 20 MIN: CPT | Mod: ,,, | Performed by: FAMILY MEDICINE

## 2022-10-27 PROCEDURE — 3077F PR MOST RECENT SYSTOLIC BLOOD PRESSURE >= 140 MM HG: ICD-10-PCS | Mod: CPTII,,, | Performed by: FAMILY MEDICINE

## 2022-10-27 PROCEDURE — 1159F PR MEDICATION LIST DOCUMENTED IN MEDICAL RECORD: ICD-10-PCS | Mod: CPTII,,, | Performed by: FAMILY MEDICINE

## 2022-10-27 PROCEDURE — 3077F SYST BP >= 140 MM HG: CPT | Mod: CPTII,,, | Performed by: FAMILY MEDICINE

## 2022-10-27 NOTE — PROGRESS NOTES
Subjective:       Patient ID: Angela Landaverde is a 62 y.o. female.    Chief Complaint: Follow-up    Patient is a 62 year old female that presents to the clinic today for follow up.  She has a PMH of HTN, HLD, gout, venous insufficiency and arthritis of R knee. The patient saw Dr. Pemberton in orthopedics earlier this month for a joint injection in her R knee. She has continued pain and discomfort there however she states she feels better after the joint injection. She states her next follow up appointment with Orthopedics is in January 2023 to reassess her R knee. She was discussing further knee injections or the possibility of TRK replacement. Her BP was slightly elevated today and she states that she did not take her medication this morning. It was stressed to her the importance of taking them everyday. She has been seeing Dr. Novoa in the vein center that has seemed to help her previous leg swelling issue.  The patient was encouraged to lose weight, as obesity complicates all aspects of health. She understood and agreed to follow up in 3 months.      Current Outpatient Medications:     allopurinoL (ZYLOPRIM) 100 MG tablet, Take 1 tablet (100 mg total) by mouth once daily., Disp: 90 tablet, Rfl: 2    atenoloL-chlorthalidone (TENORETIC) 50-25 mg Tab, Take 1 tablet by mouth once daily., Disp: 90 tablet, Rfl: 2    gabapentin (NEURONTIN) 100 MG capsule, Take 3 capsules (300 mg total) by mouth daily as needed (pain)., Disp: 90 capsule, Rfl: 1    levothyroxine (EUTHYROX) 88 MCG tablet, Take 1 tablet (88 mcg total) by mouth before breakfast., Disp: 30 tablet, Rfl: 1    meloxicam (MOBIC) 7.5 MG tablet, Take 1 tablet (7.5 mg total) by mouth once daily., Disp: 30 tablet, Rfl: 2    methocarbamoL (ROBAXIN) 750 MG Tab, TAKE 1 TABLET BY MOUTH 4 TIMES DAILY AS NEEDED, Disp: 30 tablet, Rfl: 4    oxaprozin (DAYPRO) 600 mg tablet, Take 1 tablet (600 mg total) by mouth 2 (two) times daily as needed (pain)., Disp: 60 tablet, Rfl: 5     diclofenac sodium (VOLTAREN) 1 % Gel, APPLY 2 GRAMS TO AFFECTED AREAS EVERY 6 HOURS AS NEEDED, Disp: , Rfl:     nystatin-triamcinolone (MYCOLOG II) cream, Apply 1 each topically., Disp: , Rfl:     Review of patient's allergies indicates:  No Known Allergies    Past Medical History:   Diagnosis Date    Arthritis     Hypertension     Thyroid disease        Past Surgical History:   Procedure Laterality Date     SECTION      TUBAL LIGATION         Family History   Problem Relation Age of Onset    Cancer Mother     Heart disease Father     Cancer Brother     Cancer Brother        Social History     Tobacco Use    Smoking status: Never    Smokeless tobacco: Never   Substance Use Topics    Alcohol use: Not Currently     Comment: occasionally    Drug use: Never       Review of Systems   Constitutional:  Positive for activity change. Negative for unexpected weight change.   HENT:  Negative for hearing loss, rhinorrhea and trouble swallowing.    Eyes:  Negative for discharge and visual disturbance.   Respiratory:  Negative for cough, chest tightness, shortness of breath and wheezing.    Cardiovascular:  Negative for chest pain and palpitations.   Gastrointestinal:  Negative for blood in stool, constipation, diarrhea and vomiting.   Endocrine: Negative for polydipsia and polyuria.   Genitourinary:  Negative for difficulty urinating, dysuria, flank pain, hematuria and menstrual problem.   Musculoskeletal:  Positive for arthralgias and joint swelling. Negative for neck pain and neck stiffness.   Neurological:  Negative for weakness, numbness and headaches.   Psychiatric/Behavioral:  Negative for agitation, behavioral problems, confusion, dysphoric mood and sleep disturbance. The patient is not nervous/anxious.    All other systems reviewed and are negative.      Current Medications:   Medication List with Changes/Refills   Current Medications    ALLOPURINOL (ZYLOPRIM) 100 MG TABLET    Take 1 tablet (100 mg  "total) by mouth once daily.       Start Date: 8/30/2022 End Date: --    ATENOLOL-CHLORTHALIDONE (TENORETIC) 50-25 MG TAB    Take 1 tablet by mouth once daily.       Start Date: 8/30/2022 End Date: --    DICLOFENAC SODIUM (VOLTAREN) 1 % GEL    APPLY 2 GRAMS TO AFFECTED AREAS EVERY 6 HOURS AS NEEDED       Start Date: 12/29/2020End Date: --    GABAPENTIN (NEURONTIN) 100 MG CAPSULE    Take 3 capsules (300 mg total) by mouth daily as needed (pain).       Start Date: 8/30/2022 End Date: 8/30/2023    LEVOTHYROXINE (EUTHYROX) 88 MCG TABLET    Take 1 tablet (88 mcg total) by mouth before breakfast.       Start Date: 8/31/2022 End Date: --    MELOXICAM (MOBIC) 7.5 MG TABLET    Take 1 tablet (7.5 mg total) by mouth once daily.       Start Date: 10/12/2022End Date: --    METHOCARBAMOL (ROBAXIN) 750 MG TAB    TAKE 1 TABLET BY MOUTH 4 TIMES DAILY AS NEEDED       Start Date: 2/16/2022 End Date: --    NYSTATIN-TRIAMCINOLONE (MYCOLOG II) CREAM    Apply 1 each topically.       Start Date: 6/21/2022 End Date: --    OXAPROZIN (DAYPRO) 600 MG TABLET    Take 1 tablet (600 mg total) by mouth 2 (two) times daily as needed (pain).       Start Date: 8/30/2022 End Date: --            Objective:        Vitals:    10/27/22 1330   BP: (!) 159/93   Pulse: 70   Temp: 98 °F (36.7 °C)   TempSrc: Temporal   SpO2: 96%   Weight: (!) 136.8 kg (301 lb 9.6 oz)   Height: 5' 7" (1.702 m)       Physical Exam  Constitutional:       General: She is awake. She is not in acute distress.     Appearance: Normal appearance. She is well-developed and well-groomed. She is obese. She is not toxic-appearing.   HENT:      Head: Normocephalic and atraumatic.      Mouth/Throat:      Mouth: Mucous membranes are moist.      Pharynx: Oropharynx is clear.   Eyes:      Conjunctiva/sclera: Conjunctivae normal.   Cardiovascular:      Rate and Rhythm: Normal rate and regular rhythm.      Pulses: Normal pulses.      Heart sounds: Normal heart sounds.   Pulmonary:      Effort: " Pulmonary effort is normal.      Breath sounds: Normal breath sounds.   Abdominal:      General: There is no distension.      Palpations: Abdomen is soft.      Tenderness: There is no abdominal tenderness. There is no guarding.   Musculoskeletal:         General: Tenderness present. No swelling or signs of injury.      Right lower leg: No edema (anterior side only, not diffuse).        Legs:    Skin:     General: Skin is warm.   Neurological:      Mental Status: She is alert and oriented to person, place, and time.   Psychiatric:         Mood and Affect: Mood normal.         Behavior: Behavior normal. Behavior is cooperative.         Thought Content: Thought content normal.         Judgment: Judgment normal.           Lab Results   Component Value Date    CHOL 184 02/16/2022    TRIG 47 02/16/2022    HDL 75 (H) 02/16/2022    ALT 24 08/30/2022    AST 20 08/30/2022     08/30/2022    K 4.5 08/30/2022     08/30/2022    CREATININE 0.86 08/30/2022    BUN 14 08/30/2022    CO2 31 08/30/2022    TSH 4.440 (H) 08/30/2022      Assessment:       1. Recurrent pain of right knee          Plan:         Problem List Items Addressed This Visit          Orthopedic    Recurrent pain of right knee     Patient saw Dr. Mark Pemberton in early October  Early October received R knee joint injection  She states the pain is better and will follow up with Ortho in January 2023              Follow up in about 3 months (around 1/27/2023).    Shant Matta MD     Instructed patient that if symptoms fail to improve or worsen patient should seek immediate medical attention or report to the nearest emergency department. Patient expressed verbal agreement and understanding to this plan of care.

## 2022-10-27 NOTE — PROGRESS NOTES
I have reviewed the notes, assessments, and/or procedures performed by , I concur with his documentation of Angela Walker.

## 2022-10-27 NOTE — ASSESSMENT & PLAN NOTE
Patient saw Dr. Mark Pemberton in early October  Early October received R knee joint injection  She states the pain is better and will follow up with Ortho in January 2023

## 2022-11-07 ENCOUNTER — LAB VISIT (OUTPATIENT)
Dept: LAB | Facility: CLINIC | Age: 62
End: 2022-11-07
Payer: COMMERCIAL

## 2022-11-07 DIAGNOSIS — E03.9 HYPOTHYROIDISM, UNSPECIFIED TYPE: ICD-10-CM

## 2022-11-07 PROCEDURE — 84443 TSH: ICD-10-PCS | Mod: ,,, | Performed by: CLINICAL MEDICAL LABORATORY

## 2022-11-07 PROCEDURE — 84443 ASSAY THYROID STIM HORMONE: CPT | Mod: ,,, | Performed by: CLINICAL MEDICAL LABORATORY

## 2022-11-07 PROCEDURE — 84439 ASSAY OF FREE THYROXINE: CPT | Mod: ,,, | Performed by: CLINICAL MEDICAL LABORATORY

## 2022-11-07 PROCEDURE — 84439 T4, FREE: ICD-10-PCS | Mod: ,,, | Performed by: CLINICAL MEDICAL LABORATORY

## 2022-11-08 LAB
T4 FREE SERPL-MCNC: 1.02 NG/DL (ref 0.76–1.46)
TSH SERPL DL<=0.005 MIU/L-ACNC: 1.86 UIU/ML (ref 0.36–3.74)

## 2022-11-09 DIAGNOSIS — E03.9 HYPOTHYROIDISM, UNSPECIFIED TYPE: ICD-10-CM

## 2022-11-09 RX ORDER — LEVOTHYROXINE SODIUM 88 UG/1
88 TABLET ORAL
Qty: 30 TABLET | Refills: 2 | Status: SHIPPED | OUTPATIENT
Start: 2022-11-09 | End: 2023-02-07

## 2022-11-09 NOTE — TELEPHONE ENCOUNTER
----- Message from Mary Landaverde sent at 11/9/2022  9:41 AM CST -----  Patient called about lab results from Monday. Needs to know them before getting meds    [facility]    [phone]

## 2023-01-11 ENCOUNTER — OFFICE VISIT (OUTPATIENT)
Dept: ORTHOPEDICS | Facility: CLINIC | Age: 63
End: 2023-01-11
Payer: COMMERCIAL

## 2023-01-11 ENCOUNTER — TELEPHONE (OUTPATIENT)
Dept: FAMILY MEDICINE | Facility: CLINIC | Age: 63
End: 2023-01-11
Payer: COMMERCIAL

## 2023-01-11 DIAGNOSIS — M17.11 OSTEOARTHRITIS OF RIGHT KNEE, UNSPECIFIED OSTEOARTHRITIS TYPE: ICD-10-CM

## 2023-01-11 DIAGNOSIS — M17.11 OSTEOARTHRITIS OF RIGHT KNEE, UNSPECIFIED OSTEOARTHRITIS TYPE: Primary | ICD-10-CM

## 2023-01-11 DIAGNOSIS — M25.561 RIGHT KNEE PAIN, UNSPECIFIED CHRONICITY: Primary | ICD-10-CM

## 2023-01-11 PROCEDURE — 20610 DRAIN/INJ JOINT/BURSA W/O US: CPT | Mod: PBBFAC | Performed by: NURSE PRACTITIONER

## 2023-01-11 PROCEDURE — 20610 LARGE JOINT ASPIRATION/INJECTION: R SUPRA PATELLAR BURSA: ICD-10-PCS | Mod: S$PBB,RT,, | Performed by: NURSE PRACTITIONER

## 2023-01-11 PROCEDURE — 99213 OFFICE O/P EST LOW 20 MIN: CPT | Mod: S$PBB,25,, | Performed by: NURSE PRACTITIONER

## 2023-01-11 PROCEDURE — 99212 OFFICE O/P EST SF 10 MIN: CPT | Mod: PBBFAC,25 | Performed by: NURSE PRACTITIONER

## 2023-01-11 PROCEDURE — 99213 PR OFFICE/OUTPT VISIT, EST, LEVL III, 20-29 MIN: ICD-10-PCS | Mod: S$PBB,25,, | Performed by: NURSE PRACTITIONER

## 2023-01-11 RX ORDER — TRIAMCINOLONE ACETONIDE 40 MG/ML
40 INJECTION, SUSPENSION INTRA-ARTICULAR; INTRAMUSCULAR
Status: DISCONTINUED | OUTPATIENT
Start: 2023-01-11 | End: 2023-01-11 | Stop reason: HOSPADM

## 2023-01-11 RX ADMIN — TRIAMCINOLONE ACETONIDE 40 MG: 40 INJECTION, SUSPENSION INTRA-ARTICULAR; INTRAMUSCULAR at 09:01

## 2023-01-11 NOTE — TELEPHONE ENCOUNTER
----- Message from Paty Sahu sent at 1/11/2023  9:55 AM CST -----  Regarding: Med Refill  Pt called requesting refill of diclofenac sodium (VOLTAREN) 1 % Gel. Pt has been told that since it was last filled in 2020, pt would have to be seen to have that medication refilled. Pt asked that I still ask and see if can be refilled. Pt states she has an appointment to se Dr. Parmar at the end of February. Pharmacy: [facility] [address]. Pt phone #: [phone]

## 2023-01-11 NOTE — PROGRESS NOTES
CC:  Knee pain    62 y.o. Female returns to clinic for a follow up visit regarding knee pain.       Patient is still having knee pain. She reports that she does not have enough PTO at work to consider surgery at this point.   She would like to discuss getting another injection today and possibly be ready for surgery in about 3 months.   She is also interested in getting a gel injection if insurance will pay for it.  Her last injection was in October.  She only had relief for a couple weeks after.  The pain returned.    She is also having pain today on the bottom  of her right foot particularly on the ball of her foot.  She is being seen by a foot specialist.       Past Medical History:   Diagnosis Date    Arthritis     Hypertension     Thyroid disease      Past Surgical History:   Procedure Laterality Date     SECTION      TUBAL LIGATION           PHYSICAL EXAMINATION:  There were no vitals taken for this visit.  General    Constitutional: She is oriented to person, place, and time. She appears well-nourished.   HENT:   Head: Normocephalic and atraumatic.   Eyes: Pupils are equal, round, and reactive to light.   Neck: Neck supple.   Cardiovascular:  Normal rate and regular rhythm.            Pulmonary/Chest: Effort normal. No respiratory distress.   Abdominal: There is no abdominal tenderness. There is no guarding.   Neurological: She is alert and oriented to person, place, and time. She has normal reflexes.   Psychiatric: She has a normal mood and affect. Her behavior is normal. Judgment and thought content normal.           Right Knee Exam     Inspection   Swelling: present  Effusion: present    Tenderness   The patient is tender to palpation of the medial joint line and lateral joint line.    Crepitus   The patient has crepitus of the patella.    Range of Motion   Extension:  normal   Flexion:  abnormal     Tests   Meniscus   Dilan:  Medial - positive   Ligament Examination   Lachman: normal (-1 to  2mm)   PCL-Posterior Drawer: normal (0 to 2mm)     Patella   Patellar Tracking: normal  Patellar Grind: positive    Other   Sensation: normal    Muscle Strength   Right Lower Extremity   Quadriceps:  5/5   Hamstrin/5     Reflexes     Right Side   Achilles:  2+  Quadriceps:  2+    Vascular Exam     Right Pulses  Dorsalis Pedis:      2+  Posterior Tibial:      2+        IMAGING:  No results found.     ASSESSMENT:      ICD-10-CM ICD-9-CM   1. Right knee pain, unspecified chronicity  M25.561 719.46       PLAN:     -Findings and treatment options were discussed with the patient  -All questions answered  Natural history and expected course discussed. Questions answered.  Educational materials distributed.  Reduction in offending activity.  OTC analgesics as needed.  Arthrocentesis. See procedure note.  Right knee steroid injection today.  We will order a Synvisc injection at next visit.  Patient is not getting long-term relief from steroid injections and she is still trying to continue working.    There are no Patient Instructions on file for this visit.      No orders of the defined types were placed in this encounter.        Large Joint Aspiration/Injection: R supra patellar bursa    Date/Time: 2023 9:00 AM  Performed by: MAKAYLA Guzmán  Authorized by: MAKAYLA Guzmán     Consent Done?:  Yes (Verbal)  Indications:  Pain  Site marked: the procedure site was marked    Local anesthetic:  Bupivacaine 0.25% without epinephrine  Anesthetic total (ml):  3      Details:  Needle Size:  22 G  Location:  Knee  Site:  R supra patellar bursa  Medications:  40 mg triamcinolone acetonide 40 mg/mL  Patient tolerance:  Patient tolerated the procedure well with no immediate complications

## 2023-01-11 NOTE — TELEPHONE ENCOUNTER
Spoke with pt and informed her that Dr. Parmar recommended her to go to buy the Voltaren gel OTC. Since she has not prescribed her that medication recently.  Pts states understanding

## 2023-01-30 ENCOUNTER — OFFICE VISIT (OUTPATIENT)
Dept: FAMILY MEDICINE | Facility: CLINIC | Age: 63
End: 2023-01-30
Payer: COMMERCIAL

## 2023-01-30 VITALS
DIASTOLIC BLOOD PRESSURE: 64 MMHG | OXYGEN SATURATION: 96 % | RESPIRATION RATE: 20 BRPM | TEMPERATURE: 98 F | BODY MASS INDEX: 45.99 KG/M2 | SYSTOLIC BLOOD PRESSURE: 137 MMHG | HEIGHT: 67 IN | HEART RATE: 62 BPM | WEIGHT: 293 LBS

## 2023-01-30 DIAGNOSIS — R31.9 HEMATURIA, UNSPECIFIED TYPE: ICD-10-CM

## 2023-01-30 DIAGNOSIS — N93.9 VAGINAL SPOTTING: Primary | ICD-10-CM

## 2023-01-30 LAB
ALBUMIN SERPL BCP-MCNC: 3.8 G/DL (ref 3.5–5)
ALBUMIN/GLOB SERPL: 1.2 {RATIO}
ALP SERPL-CCNC: 75 U/L (ref 50–130)
ALT SERPL W P-5'-P-CCNC: 23 U/L (ref 13–56)
ANION GAP SERPL CALCULATED.3IONS-SCNC: 10 MMOL/L (ref 7–16)
AST SERPL W P-5'-P-CCNC: 17 U/L (ref 15–37)
BASOPHILS # BLD AUTO: 0.04 K/UL (ref 0–0.2)
BASOPHILS NFR BLD AUTO: 0.6 % (ref 0–1)
BILIRUB SERPL-MCNC: 0.6 MG/DL (ref ?–1.2)
BILIRUB UR QL STRIP: NEGATIVE
BUN SERPL-MCNC: 14 MG/DL (ref 7–18)
BUN/CREAT SERPL: 19 (ref 6–20)
CALCIUM SERPL-MCNC: 9.3 MG/DL (ref 8.5–10.1)
CHLORIDE SERPL-SCNC: 104 MMOL/L (ref 98–107)
CLARITY UR: CLEAR
CO2 SERPL-SCNC: 33 MMOL/L (ref 21–32)
COLOR UR: ABNORMAL
CREAT SERPL-MCNC: 0.74 MG/DL (ref 0.55–1.02)
DIFFERENTIAL METHOD BLD: ABNORMAL
EGFR (NO RACE VARIABLE) (RUSH/TITUS): 93 ML/MIN/1.73M²
EOSINOPHIL # BLD AUTO: 0.1 K/UL (ref 0–0.5)
EOSINOPHIL NFR BLD AUTO: 1.4 % (ref 1–4)
ERYTHROCYTE [DISTWIDTH] IN BLOOD BY AUTOMATED COUNT: 11.8 % (ref 11.5–14.5)
GLOBULIN SER-MCNC: 3.2 G/DL (ref 2–4)
GLUCOSE SERPL-MCNC: 97 MG/DL (ref 74–106)
GLUCOSE UR STRIP-MCNC: NORMAL MG/DL
HCT VFR BLD AUTO: 38.9 % (ref 38–47)
HGB BLD-MCNC: 12.5 G/DL (ref 12–16)
IMM GRANULOCYTES # BLD AUTO: 0.02 K/UL (ref 0–0.04)
IMM GRANULOCYTES NFR BLD: 0.3 % (ref 0–0.4)
KETONES UR STRIP-SCNC: NEGATIVE MG/DL
LEUKOCYTE ESTERASE UR QL STRIP: NEGATIVE
LYMPHOCYTES # BLD AUTO: 1.45 K/UL (ref 1–4.8)
LYMPHOCYTES NFR BLD AUTO: 20.1 % (ref 27–41)
MCH RBC QN AUTO: 28.8 PG (ref 27–31)
MCHC RBC AUTO-ENTMCNC: 32.1 G/DL (ref 32–36)
MCV RBC AUTO: 89.6 FL (ref 80–96)
MONOCYTES # BLD AUTO: 0.54 K/UL (ref 0–0.8)
MONOCYTES NFR BLD AUTO: 7.5 % (ref 2–6)
MPC BLD CALC-MCNC: 10.3 FL (ref 9.4–12.4)
NEUTROPHILS # BLD AUTO: 5.05 K/UL (ref 1.8–7.7)
NEUTROPHILS NFR BLD AUTO: 70.1 % (ref 53–65)
NITRITE UR QL STRIP: NEGATIVE
NRBC # BLD AUTO: 0 X10E3/UL
NRBC, AUTO (.00): 0 %
PH UR STRIP: 6.5 PH UNITS
PLATELET # BLD AUTO: 260 K/UL (ref 150–400)
POTASSIUM SERPL-SCNC: 4.2 MMOL/L (ref 3.5–5.1)
PROT SERPL-MCNC: 7 G/DL (ref 6.4–8.2)
PROT UR QL STRIP: NEGATIVE
RBC # BLD AUTO: 4.34 M/UL (ref 4.2–5.4)
RBC # UR STRIP: ABNORMAL /UL
RBC #/AREA URNS HPF: 2 /HPF
SODIUM SERPL-SCNC: 143 MMOL/L (ref 136–145)
SP GR UR STRIP: 1.01
SQUAMOUS #/AREA URNS LPF: ABNORMAL /HPF
UROBILINOGEN UR STRIP-ACNC: NORMAL MG/DL
WBC # BLD AUTO: 7.2 K/UL (ref 4.5–11)
WBC #/AREA URNS HPF: 1 /HPF

## 2023-01-30 PROCEDURE — 99213 OFFICE O/P EST LOW 20 MIN: CPT | Mod: GC,,, | Performed by: SPECIALIST

## 2023-01-30 PROCEDURE — 3075F SYST BP GE 130 - 139MM HG: CPT | Mod: CPTII,,, | Performed by: SPECIALIST

## 2023-01-30 PROCEDURE — 85025 COMPLETE CBC W/AUTO DIFF WBC: CPT | Mod: ,,, | Performed by: CLINICAL MEDICAL LABORATORY

## 2023-01-30 PROCEDURE — 3078F PR MOST RECENT DIASTOLIC BLOOD PRESSURE < 80 MM HG: ICD-10-PCS | Mod: CPTII,,, | Performed by: SPECIALIST

## 2023-01-30 PROCEDURE — 1159F PR MEDICATION LIST DOCUMENTED IN MEDICAL RECORD: ICD-10-PCS | Mod: CPTII,,, | Performed by: SPECIALIST

## 2023-01-30 PROCEDURE — 99213 PR OFFICE/OUTPT VISIT, EST, LEVL III, 20-29 MIN: ICD-10-PCS | Mod: GC,,, | Performed by: SPECIALIST

## 2023-01-30 PROCEDURE — 81001 URINALYSIS, REFLEX TO URINE CULTURE: ICD-10-PCS | Mod: ,,, | Performed by: CLINICAL MEDICAL LABORATORY

## 2023-01-30 PROCEDURE — 80053 COMPREHEN METABOLIC PANEL: CPT | Mod: ,,, | Performed by: CLINICAL MEDICAL LABORATORY

## 2023-01-30 PROCEDURE — 1160F RVW MEDS BY RX/DR IN RCRD: CPT | Mod: CPTII,,, | Performed by: SPECIALIST

## 2023-01-30 PROCEDURE — 3008F BODY MASS INDEX DOCD: CPT | Mod: CPTII,,, | Performed by: SPECIALIST

## 2023-01-30 PROCEDURE — 3008F PR BODY MASS INDEX (BMI) DOCUMENTED: ICD-10-PCS | Mod: CPTII,,, | Performed by: SPECIALIST

## 2023-01-30 PROCEDURE — 3078F DIAST BP <80 MM HG: CPT | Mod: CPTII,,, | Performed by: SPECIALIST

## 2023-01-30 PROCEDURE — 85025 CBC WITH DIFFERENTIAL: ICD-10-PCS | Mod: ,,, | Performed by: CLINICAL MEDICAL LABORATORY

## 2023-01-30 PROCEDURE — 80053 COMPREHENSIVE METABOLIC PANEL: ICD-10-PCS | Mod: ,,, | Performed by: CLINICAL MEDICAL LABORATORY

## 2023-01-30 PROCEDURE — 3075F PR MOST RECENT SYSTOLIC BLOOD PRESS GE 130-139MM HG: ICD-10-PCS | Mod: CPTII,,, | Performed by: SPECIALIST

## 2023-01-30 PROCEDURE — 1159F MED LIST DOCD IN RCRD: CPT | Mod: CPTII,,, | Performed by: SPECIALIST

## 2023-01-30 PROCEDURE — 1160F PR REVIEW ALL MEDS BY PRESCRIBER/CLIN PHARMACIST DOCUMENTED: ICD-10-PCS | Mod: CPTII,,, | Performed by: SPECIALIST

## 2023-01-30 PROCEDURE — 81001 URINALYSIS AUTO W/SCOPE: CPT | Mod: ,,, | Performed by: CLINICAL MEDICAL LABORATORY

## 2023-02-01 PROBLEM — N93.9 VAGINAL SPOTTING: Status: ACTIVE | Noted: 2023-02-01

## 2023-02-01 PROBLEM — R31.9 HEMATURIA: Status: ACTIVE | Noted: 2023-02-01

## 2023-02-01 NOTE — PROGRESS NOTES
Subjective:       Patient ID: Angela Landaverde is a 62 y.o. female.    Chief Complaint: Menopause (Post menopause spotting. Been going on 1 week. Spotting when urinating. Went to OB/GYN in October to have Pap Smear. Came back normal)    A 63 yo female presents w/ cc of bleeding when she voids x 1 week. Pt reports she had similar sx in October, 2022 and saw her ob/gyn, Dr. Pearson. Pt states she had biopsy of her cervix that time. Pt states she had menopause in her early 50s. Denies hx of hysterectomy. Pt reports that when she wipes after she voids, she noticed bright red blood from the toilet paper. Pt states her urine color is yellow, denies dysuria. Denies personal hx of cancer. Denies hx of smoking or drinking alcohol. Denies any fever, abdominal pain, or lower back pain. Denies hematochezia, bloody stool. Pt denies taking any blood thinner.           Current Outpatient Medications:     allopurinoL (ZYLOPRIM) 100 MG tablet, Take 1 tablet (100 mg total) by mouth once daily., Disp: 90 tablet, Rfl: 2    atenoloL-chlorthalidone (TENORETIC) 50-25 mg Tab, Take 1 tablet by mouth once daily., Disp: 90 tablet, Rfl: 2    diclofenac sodium (VOLTAREN) 1 % Gel, APPLY 2 GRAMS TO AFFECTED AREAS EVERY 6 HOURS AS NEEDED, Disp: , Rfl:     gabapentin (NEURONTIN) 100 MG capsule, Take 3 capsules (300 mg total) by mouth daily as needed (pain)., Disp: 90 capsule, Rfl: 1    levothyroxine (EUTHYROX) 88 MCG tablet, Take 1 tablet (88 mcg total) by mouth before breakfast., Disp: 30 tablet, Rfl: 2    meloxicam (MOBIC) 7.5 MG tablet, Take 1 tablet (7.5 mg total) by mouth once daily., Disp: 30 tablet, Rfl: 2    methocarbamoL (ROBAXIN) 750 MG Tab, TAKE 1 TABLET BY MOUTH 4 TIMES DAILY AS NEEDED, Disp: 30 tablet, Rfl: 4    nystatin-triamcinolone (MYCOLOG II) cream, Apply 1 each topically., Disp: , Rfl:     oxaprozin (DAYPRO) 600 mg tablet, Take 1 tablet (600 mg total) by mouth 2 (two) times daily as needed (pain)., Disp: 60 tablet, Rfl: 5    Review  of patient's allergies indicates:  No Known Allergies    Past Medical History:   Diagnosis Date    Arthritis     Hypertension     Thyroid disease        Past Surgical History:   Procedure Laterality Date     SECTION      TUBAL LIGATION         Family History   Problem Relation Age of Onset    Cancer Mother     Heart disease Father     Cancer Brother     Cancer Brother        Social History     Tobacco Use    Smoking status: Never    Smokeless tobacco: Never   Substance Use Topics    Alcohol use: Not Currently     Comment: occasionally    Drug use: Never       Review of Systems   Constitutional:  Negative for activity change and unexpected weight change.   HENT:  Negative for hearing loss, rhinorrhea and trouble swallowing.    Eyes:  Negative for discharge and visual disturbance.   Respiratory:  Negative for chest tightness and wheezing.    Cardiovascular:  Negative for chest pain and palpitations.   Gastrointestinal:  Negative for blood in stool, constipation, diarrhea and vomiting.   Endocrine: Positive for polyuria. Negative for polydipsia.   Genitourinary:  Positive for hematuria. Negative for difficulty urinating, dysuria and menstrual problem.   Musculoskeletal:  Positive for arthralgias. Negative for neck pain.   Neurological:  Negative for weakness and headaches.   Psychiatric/Behavioral:  Negative for confusion and dysphoric mood.        Current Medications:   Medication List with Changes/Refills   Current Medications    ALLOPURINOL (ZYLOPRIM) 100 MG TABLET    Take 1 tablet (100 mg total) by mouth once daily.       Start Date: 2022 End Date: --    ATENOLOL-CHLORTHALIDONE (TENORETIC) 50-25 MG TAB    Take 1 tablet by mouth once daily.       Start Date: 2022 End Date: --    DICLOFENAC SODIUM (VOLTAREN) 1 % GEL    APPLY 2 GRAMS TO AFFECTED AREAS EVERY 6 HOURS AS NEEDED       Start Date: 2020End Date: --    GABAPENTIN (NEURONTIN) 100 MG CAPSULE    Take 3 capsules (300 mg total) by mouth  "daily as needed (pain).       Start Date: 8/30/2022 End Date: 8/30/2023    LEVOTHYROXINE (EUTHYROX) 88 MCG TABLET    Take 1 tablet (88 mcg total) by mouth before breakfast.       Start Date: 11/9/2022 End Date: --    MELOXICAM (MOBIC) 7.5 MG TABLET    Take 1 tablet (7.5 mg total) by mouth once daily.       Start Date: 10/12/2022End Date: --    METHOCARBAMOL (ROBAXIN) 750 MG TAB    TAKE 1 TABLET BY MOUTH 4 TIMES DAILY AS NEEDED       Start Date: 2/16/2022 End Date: --    NYSTATIN-TRIAMCINOLONE (MYCOLOG II) CREAM    Apply 1 each topically.       Start Date: 6/21/2022 End Date: --    OXAPROZIN (DAYPRO) 600 MG TABLET    Take 1 tablet (600 mg total) by mouth 2 (two) times daily as needed (pain).       Start Date: 8/30/2022 End Date: --            Objective:        Vitals:    01/30/23 1418   BP: 137/64   BP Location: Left arm   Patient Position: Sitting   BP Method: Medium (Automatic)   Pulse: 62   Resp: 20   Temp: 98 °F (36.7 °C)   TempSrc: Oral   SpO2: 96%   Weight: 134.9 kg (297 lb 6.4 oz)   Height: 5' 7" (1.702 m)       Physical Exam  Constitutional:       General: She is not in acute distress.     Appearance: She is not ill-appearing, toxic-appearing or diaphoretic.   HENT:      Head: Normocephalic and atraumatic.      Mouth/Throat:      Pharynx: Oropharynx is clear.   Eyes:      Conjunctiva/sclera: Conjunctivae normal.   Cardiovascular:      Rate and Rhythm: Normal rate and regular rhythm.      Pulses: Normal pulses.      Heart sounds: Normal heart sounds.   Pulmonary:      Effort: Pulmonary effort is normal.      Breath sounds: Normal breath sounds.   Abdominal:      General: Bowel sounds are normal.      Palpations: Abdomen is soft.   Musculoskeletal:      Right lower leg: No edema.      Left lower leg: No edema.   Skin:     General: Skin is warm.   Neurological:      General: No focal deficit present.      Mental Status: She is alert.   Psychiatric:         Mood and Affect: Mood normal.           Lab Results "   Component Value Date    WBC 7.20 01/30/2023    HGB 12.5 01/30/2023    HCT 38.9 01/30/2023     01/30/2023    CHOL 184 02/16/2022    TRIG 47 02/16/2022    HDL 75 (H) 02/16/2022    ALT 23 01/30/2023    AST 17 01/30/2023     01/30/2023    K 4.2 01/30/2023     01/30/2023    CREATININE 0.74 01/30/2023    BUN 14 01/30/2023    CO2 33 (H) 01/30/2023    TSH 1.860 11/07/2022      Assessment:       1. Vaginal spotting    2. Hematuria, unspecified type          Plan:         Problem List Items Addressed This Visit          Renal/    Vaginal spotting - Primary     -differentials include postmenopausal sx, cervical CA, endometrial CA  -medical record requested from ob/gyn Dr. Pearson for cervical biopsy results   -CBC, CMP, UA   -will also consider urology referral to r/o bladder issues  -Follow up in 2 weeks          Relevant Orders    CBC Auto Differential (Completed)    Comprehensive Metabolic Panel (Completed)    Urinalysis, Reflex to Urine Culture (Completed)    Urinalysis, Microscopic (Completed)    Hematuria     Etiology unclear. Will rule out UTI          Relevant Orders    CBC Auto Differential (Completed)    Comprehensive Metabolic Panel (Completed)    Urinalysis, Reflex to Urine Culture (Completed)    Urinalysis, Microscopic (Completed)         Follow up in about 2 weeks (around 2/13/2023).    Darrel Lind DO     Instructed patient that if symptoms fail to improve or worsen patient should seek immediate medical attention or report to the nearest emergency department. Patient expressed verbal agreement and understanding to this plan of care.

## 2023-02-01 NOTE — ASSESSMENT & PLAN NOTE
-differentials include postmenopausal sx, cervical CA, endometrial CA  -medical record requested from ob/gyn Dr. Pearson for cervical biopsy results   -CBC, CMP, UA   -will also consider urology referral to r/o bladder issues  -Follow up in 2 weeks

## 2023-02-03 NOTE — PROGRESS NOTES
See attest noteAnswers submitted by the patient for this visit:  Review of Systems Questionnaire (Submitted on 1/26/2023)  activity change: No  unexpected weight change: No  neck pain: No  hearing loss: No  rhinorrhea: No  trouble swallowing: No  eye discharge: No  visual disturbance: No  chest tightness: No  wheezing: No  chest pain: No  palpitations: No  blood in stool: No  constipation: No  vomiting: No  diarrhea: No  polydipsia: No  polyuria: Yes  difficulty urinating: No  hematuria: Yes  menstrual problem: No  dysuria: No  arthralgias: Yes  headaches: No  weakness: No  confusion: No  dysphoric mood: No

## 2023-03-07 DIAGNOSIS — E03.9 HYPOTHYROIDISM, UNSPECIFIED TYPE: ICD-10-CM

## 2023-03-07 RX ORDER — LEVOTHYROXINE SODIUM 88 UG/1
88 TABLET ORAL
Qty: 90 TABLET | Refills: 1 | Status: SHIPPED | OUTPATIENT
Start: 2023-03-07 | End: 2023-09-05 | Stop reason: SDUPTHER

## 2023-03-07 NOTE — TELEPHONE ENCOUNTER
----- Message from Jacque Robertson sent at 3/7/2023  8:50 AM CST -----  Pt is requesting a refill a refill on levothyroxine. Requesting a 90 day supply. Send to [facility] on [address]. [phone].

## 2023-03-13 ENCOUNTER — OFFICE VISIT (OUTPATIENT)
Dept: ORTHOPEDICS | Facility: CLINIC | Age: 63
End: 2023-03-13
Payer: COMMERCIAL

## 2023-03-13 DIAGNOSIS — M25.561 RIGHT KNEE PAIN, UNSPECIFIED CHRONICITY: Primary | ICD-10-CM

## 2023-03-13 DIAGNOSIS — M17.11 OSTEOARTHRITIS OF RIGHT KNEE, UNSPECIFIED OSTEOARTHRITIS TYPE: ICD-10-CM

## 2023-03-13 PROCEDURE — 99213 OFFICE O/P EST LOW 20 MIN: CPT | Mod: PBBFAC | Performed by: NURSE PRACTITIONER

## 2023-03-13 PROCEDURE — 1159F MED LIST DOCD IN RCRD: CPT | Mod: CPTII,,, | Performed by: NURSE PRACTITIONER

## 2023-03-13 PROCEDURE — 99212 PR OFFICE/OUTPT VISIT, EST, LEVL II, 10-19 MIN: ICD-10-PCS | Mod: S$PBB,25,, | Performed by: NURSE PRACTITIONER

## 2023-03-13 PROCEDURE — 20610 LARGE JOINT ASPIRATION/INJECTION: R SUPRA PATELLAR BURSA: ICD-10-PCS | Mod: S$PBB,RT,, | Performed by: NURSE PRACTITIONER

## 2023-03-13 PROCEDURE — 20610 DRAIN/INJ JOINT/BURSA W/O US: CPT | Mod: PBBFAC | Performed by: NURSE PRACTITIONER

## 2023-03-13 PROCEDURE — 1159F PR MEDICATION LIST DOCUMENTED IN MEDICAL RECORD: ICD-10-PCS | Mod: CPTII,,, | Performed by: NURSE PRACTITIONER

## 2023-03-13 PROCEDURE — 99212 OFFICE O/P EST SF 10 MIN: CPT | Mod: S$PBB,25,, | Performed by: NURSE PRACTITIONER

## 2023-03-13 RX ADMIN — Medication 48 MG: at 10:03

## 2023-03-13 NOTE — PROGRESS NOTES
CC:  Knee pain    62 y.o. Female returns to clinic for a follow up visit regarding knee pain.       Patient is here today to get a synvisc injection in her right knee.        Past Medical History:   Diagnosis Date    Arthritis     Hypertension     Thyroid disease      Past Surgical History:   Procedure Laterality Date     SECTION      TUBAL LIGATION           PHYSICAL EXAMINATION:  There were no vitals taken for this visit.  General    Constitutional: She is oriented to person, place, and time. She appears well-nourished.   HENT:   Head: Normocephalic and atraumatic.   Eyes: Pupils are equal, round, and reactive to light.   Neck: Neck supple.   Cardiovascular:  Normal rate and regular rhythm.            Pulmonary/Chest: Effort normal. No respiratory distress.   Abdominal: There is no abdominal tenderness. There is no guarding.   Neurological: She is alert and oriented to person, place, and time. She has normal reflexes.   Psychiatric: She has a normal mood and affect. Her behavior is normal. Judgment and thought content normal.           Right Knee Exam     Inspection   Swelling: present  Effusion: present    Tenderness   The patient is tender to palpation of the medial joint line and lateral joint line.    Crepitus   The patient has crepitus of the patella.    Range of Motion   Extension:  normal   Flexion:  abnormal     Tests   Meniscus   Dilan:  Medial - positive   Ligament Examination   Lachman: normal (-1 to 2mm)   PCL-Posterior Drawer: normal (0 to 2mm)     Patella   Patellar Tracking: normal  Patellar Grind: positive    Other   Sensation: normal    Muscle Strength   Right Lower Extremity   Quadriceps:  5/5   Hamstrin/5     Reflexes     Right Side   Achilles:  2+  Quadriceps:  2+    Vascular Exam     Right Pulses  Dorsalis Pedis:      2+  Posterior Tibial:      2+        IMAGING:  No results found.     ASSESSMENT:      ICD-10-CM ICD-9-CM   1. Right knee pain, unspecified chronicity  M25.561  719.46   2. Osteoarthritis of right knee, unspecified osteoarthritis type  M17.11 715.96       PLAN:     -Findings and treatment options were discussed with the patient  -All questions answered  Natural history and expected course discussed. Questions answered.  Educational materials distributed.  Reduction in offending activity.  OTC analgesics as needed.  Arthrocentesis. See procedure note.  Right knee synvisc one injection today    There are no Patient Instructions on file for this visit.      Orders Placed This Encounter   Procedures    Large Joint Aspiration/Injection: R supra patellar bursa         Large Joint Aspiration/Injection: R supra patellar bursa    Date/Time: 3/13/2023 10:00 AM  Performed by: MAKAYLA Guzmán  Authorized by: MAKAYLA Guzmán     Consent Done?:  Yes (Verbal)  Indications:  Pain  Site marked: the procedure site was marked    Local anesthetic:  Bupivacaine 0.25% without epinephrine  Anesthetic total (ml):  3      Details:  Needle Size:  22 G  Location:  Knee  Site:  R supra patellar bursa  Medications:  48 mg hylan g-f 20 48 mg/6 mL  Patient tolerance:  Patient tolerated the procedure well with no immediate complications

## 2023-03-16 ENCOUNTER — TELEPHONE (OUTPATIENT)
Dept: ORTHOPEDICS | Facility: CLINIC | Age: 63
End: 2023-03-16
Payer: COMMERCIAL

## 2023-03-16 NOTE — TELEPHONE ENCOUNTER
----- Message from Linda Carreon sent at 3/16/2023 12:27 PM CDT -----  Regarding: rx  Pt was supposed to have a generic for Voltaren gel sent to [facility]. Call back [phone]

## 2023-03-30 ENCOUNTER — OFFICE VISIT (OUTPATIENT)
Dept: FAMILY MEDICINE | Facility: CLINIC | Age: 63
End: 2023-03-30
Payer: COMMERCIAL

## 2023-03-30 VITALS
WEIGHT: 290 LBS | BODY MASS INDEX: 45.52 KG/M2 | HEIGHT: 67 IN | TEMPERATURE: 98 F | OXYGEN SATURATION: 98 % | DIASTOLIC BLOOD PRESSURE: 70 MMHG | HEART RATE: 68 BPM | RESPIRATION RATE: 16 BRPM | SYSTOLIC BLOOD PRESSURE: 147 MMHG

## 2023-03-30 DIAGNOSIS — M19.90 ARTHRITIS: ICD-10-CM

## 2023-03-30 DIAGNOSIS — J02.9 SORE THROAT: Primary | ICD-10-CM

## 2023-03-30 LAB
CTP QC/QA: YES
CTP QC/QA: YES
FLUAV AG NPH QL: NEGATIVE
FLUBV AG NPH QL: NEGATIVE
S PYO RRNA THROAT QL PROBE: NEGATIVE
SARS-COV-2 AG RESP QL IA.RAPID: NEGATIVE

## 2023-03-30 PROCEDURE — 87880 STREP A ASSAY W/OPTIC: CPT | Mod: QW,GC,, | Performed by: SPECIALIST

## 2023-03-30 PROCEDURE — 3078F PR MOST RECENT DIASTOLIC BLOOD PRESSURE < 80 MM HG: ICD-10-PCS | Mod: CPTII,,, | Performed by: SPECIALIST

## 2023-03-30 PROCEDURE — 1159F MED LIST DOCD IN RCRD: CPT | Mod: CPTII,,, | Performed by: SPECIALIST

## 2023-03-30 PROCEDURE — 99213 OFFICE O/P EST LOW 20 MIN: CPT | Mod: GC,,, | Performed by: SPECIALIST

## 2023-03-30 PROCEDURE — 87428 POCT SARS-COV2 (COVID) WITH FLU ANTIGEN: ICD-10-PCS | Mod: QW,,, | Performed by: SPECIALIST

## 2023-03-30 PROCEDURE — 3078F DIAST BP <80 MM HG: CPT | Mod: CPTII,,, | Performed by: SPECIALIST

## 2023-03-30 PROCEDURE — 1159F PR MEDICATION LIST DOCUMENTED IN MEDICAL RECORD: ICD-10-PCS | Mod: CPTII,,, | Performed by: SPECIALIST

## 2023-03-30 PROCEDURE — 99213 PR OFFICE/OUTPT VISIT, EST, LEVL III, 20-29 MIN: ICD-10-PCS | Mod: GC,,, | Performed by: SPECIALIST

## 2023-03-30 PROCEDURE — 1160F PR REVIEW ALL MEDS BY PRESCRIBER/CLIN PHARMACIST DOCUMENTED: ICD-10-PCS | Mod: CPTII,,, | Performed by: SPECIALIST

## 2023-03-30 PROCEDURE — 87880 POCT RAPID STREP A: ICD-10-PCS | Mod: QW,GC,, | Performed by: SPECIALIST

## 2023-03-30 PROCEDURE — 1160F RVW MEDS BY RX/DR IN RCRD: CPT | Mod: CPTII,,, | Performed by: SPECIALIST

## 2023-03-30 PROCEDURE — 3077F PR MOST RECENT SYSTOLIC BLOOD PRESSURE >= 140 MM HG: ICD-10-PCS | Mod: CPTII,,, | Performed by: SPECIALIST

## 2023-03-30 PROCEDURE — 3077F SYST BP >= 140 MM HG: CPT | Mod: CPTII,,, | Performed by: SPECIALIST

## 2023-03-30 PROCEDURE — 3008F BODY MASS INDEX DOCD: CPT | Mod: CPTII,,, | Performed by: SPECIALIST

## 2023-03-30 PROCEDURE — 87428 SARSCOV & INF VIR A&B AG IA: CPT | Mod: QW,,, | Performed by: SPECIALIST

## 2023-03-30 PROCEDURE — 3008F PR BODY MASS INDEX (BMI) DOCUMENTED: ICD-10-PCS | Mod: CPTII,,, | Performed by: SPECIALIST

## 2023-03-30 RX ORDER — DICLOFENAC SODIUM 10 MG/G
GEL TOPICAL
Qty: 50 G | Refills: 3 | Status: SHIPPED | OUTPATIENT
Start: 2023-03-30 | End: 2023-09-05 | Stop reason: SDUPTHER
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
## 2023-07-20 ENCOUNTER — PATIENT MESSAGE (OUTPATIENT)
Dept: ADMINISTRATIVE | Facility: HOSPITAL | Age: 63
End: 2023-07-20

## 2023-08-14 ENCOUNTER — TELEPHONE (OUTPATIENT)
Dept: ORTHOPEDICS | Facility: CLINIC | Age: 63
End: 2023-08-14
Payer: COMMERCIAL

## 2023-08-14 NOTE — TELEPHONE ENCOUNTER
She will let us know if Tuesday or Thursday is better for her to come see Dr Pemberton to discuss total knee replacement.    ----- Message from Smiley Vallecillo sent at 8/14/2023  1:08 PM CDT -----  Patient said she miss Padmini call returning call, Please call her 2 [phone]

## 2023-08-22 ENCOUNTER — TELEPHONE (OUTPATIENT)
Dept: ORTHOPEDICS | Facility: CLINIC | Age: 63
End: 2023-08-22
Payer: COMMERCIAL

## 2023-08-22 NOTE — TELEPHONE ENCOUNTER
She is coming Thursday to see Dr Pemberton to discuss surgery.    ----- Message from Sabrina Nieto sent at 8/22/2023  3:01 PM CDT -----  Patient is ready to schedule her surgery call back number [phone]

## 2023-08-23 DIAGNOSIS — M17.11 OSTEOARTHRITIS OF RIGHT KNEE, UNSPECIFIED OSTEOARTHRITIS TYPE: ICD-10-CM

## 2023-08-23 DIAGNOSIS — M25.561 RIGHT KNEE PAIN, UNSPECIFIED CHRONICITY: Primary | ICD-10-CM

## 2023-08-24 ENCOUNTER — HOSPITAL ENCOUNTER (OUTPATIENT)
Dept: RADIOLOGY | Facility: HOSPITAL | Age: 63
Discharge: HOME | End: 2023-08-24
Attending: ORTHOPAEDIC SURGERY
Payer: COMMERCIAL

## 2023-08-24 ENCOUNTER — OFFICE VISIT (OUTPATIENT)
Dept: ORTHOPEDICS | Facility: CLINIC | Age: 63
End: 2023-08-24
Payer: COMMERCIAL

## 2023-08-24 DIAGNOSIS — M17.11 OSTEOARTHRITIS OF RIGHT KNEE, UNSPECIFIED OSTEOARTHRITIS TYPE: Primary | ICD-10-CM

## 2023-08-24 DIAGNOSIS — M17.11 OSTEOARTHRITIS OF RIGHT KNEE, UNSPECIFIED OSTEOARTHRITIS TYPE: ICD-10-CM

## 2023-08-24 PROCEDURE — 99214 PR OFFICE/OUTPT VISIT, EST, LEVL IV, 30-39 MIN: ICD-10-PCS | Mod: S$PBB,,, | Performed by: ORTHOPAEDIC SURGERY

## 2023-08-24 PROCEDURE — 1159F PR MEDICATION LIST DOCUMENTED IN MEDICAL RECORD: ICD-10-PCS | Mod: CPTII,,, | Performed by: ORTHOPAEDIC SURGERY

## 2023-08-24 PROCEDURE — 73564 X-RAY EXAM KNEE 4 OR MORE: CPT | Mod: 26,RT,, | Performed by: ORTHOPAEDIC SURGERY

## 2023-08-24 PROCEDURE — 73564 XR KNEE COMP 4 OR MORE VIEWS RIGHT: ICD-10-PCS | Mod: 26,RT,, | Performed by: ORTHOPAEDIC SURGERY

## 2023-08-24 PROCEDURE — 99214 OFFICE O/P EST MOD 30 MIN: CPT | Mod: S$PBB,,, | Performed by: ORTHOPAEDIC SURGERY

## 2023-08-24 PROCEDURE — 73564 X-RAY EXAM KNEE 4 OR MORE: CPT | Mod: TC,RT

## 2023-08-24 PROCEDURE — 99213 OFFICE O/P EST LOW 20 MIN: CPT | Mod: PBBFAC | Performed by: ORTHOPAEDIC SURGERY

## 2023-08-24 PROCEDURE — 1159F MED LIST DOCD IN RCRD: CPT | Mod: CPTII,,, | Performed by: ORTHOPAEDIC SURGERY

## 2023-08-24 NOTE — PROGRESS NOTES
CC:  Knee pain    62 y.o. Female returns to clinic for a follow up visit regarding right knee pain.       Patient has had injections in the past. She is not getting the relief from the injections that she desires at this point.     She is ready to discuss TKA.        Past Medical History:   Diagnosis Date    Arthritis     Hypertension     Thyroid disease      Past Surgical History:   Procedure Laterality Date     SECTION      TUBAL LIGATION           PHYSICAL EXAMINATION:  There were no vitals taken for this visit.  General    Nursing note and vitals reviewed.  Constitutional: She is oriented to person, place, and time. She appears well-developed and well-nourished.   HENT:   Head: Normocephalic and atraumatic.   Nose: Nose normal.   Eyes: Pupils are equal, round, and reactive to light.   Neck: Neck supple.   Cardiovascular:  Normal rate, regular rhythm and intact distal pulses.            Pulmonary/Chest: Effort normal. No respiratory distress. She exhibits no tenderness.   Abdominal: Soft. She exhibits no distension. There is no abdominal tenderness.   Neurological: She is alert and oriented to person, place, and time. She has normal reflexes.   Psychiatric: She has a normal mood and affect. Her behavior is normal. Judgment and thought content normal.     General Musculoskeletal Exam   Gait: antalgic       Right Knee Exam     Inspection   Swelling: present  Effusion: present  Deformity: present    Tenderness   The patient is tender to palpation of the medial joint line.    Crepitus   The patient has crepitus of the patella and medial joint line.    Range of Motion   Extension:  abnormal   Flexion:  abnormal     Tests   Meniscus   Dilan:  Medial - positive   Ligament Examination   Lachman: normal (-1 to 2mm)   PCL-Posterior Drawer: normal (0 to 2mm)     MCL - Valgus: normal (0 to 2mm)  LCL - Varus: normal  Pivot Shift: normal (Equal)  Reverse Pivot Shift: normal (Equal)  Dial Test at 30 degrees: normal  (< 5 degrees)  Dial Test at 90 degrees: normal (< 5 degrees)  Posterior Sag Test: negative  Posterolateral Corner: unstable (>15 degrees difference)  Patella   Patellar Tracking: normal  Q-Angle at 90 degrees: normal  Patellar Grind: positive    Other   Sensation: normal    Muscle Strength   Right Lower Extremity   Quadriceps:  5/5   Hamstrin/5     Reflexes     Right Side   Quadriceps:  2+    Vascular Exam     Right Pulses  Dorsalis Pedis:      2+  Posterior Tibial:      2+            IMAGING:  X-Ray Knee Complete 4 Or More Views Right    Result Date: 2023  See Procedure Notes for results. IMPRESSION: Please see Ortho procedure notes for report.  This procedure was auto-finalized by: Virtual Radiologist     4 Radiographs of the right were reviewed.  There is mild valgus alignment.  Medial compartment joint space reveals advanced narrowing.  The lateral compartment demonstrates advanced joint space narrowing.  Patellofemoral compartment reveals advanced joint space narrowing  No fracture is identified.  No evidence of pathologic bone.     ASSESSMENT:      ICD-10-CM ICD-9-CM   1. Osteoarthritis of right knee, unspecified osteoarthritis type  M17.11 715.96       PLAN:     -Findings and treatment options were discussed with the patient  -All questions answered  Natural history and expected course discussed. Questions answered.  Educational materials distributed.  Rest, ice, compression, and elevation (RICE) therapy.  The conservative options including NSAIDs, activity modification, physical therapy, corticosteroid injection, and viscosupplimentation were discussed. She is interested in surgical intervention at this time.    The surgical process of knee replacement was discussed in detail with the patient including a detailed discussion of the procedure itself (including visual model, x-ray review, and literature review). The typical perioperative and post-operative course was discussed and perioperative risks  were discussed to the patient's satisfaction.  Risks and complications discussed included but were not limited to the risks of anesthetic complications, infection, bleeding, wound healing complications, aseptic loosening, instability, limb length inequality, neurologic dysfunction including numbness,  DVT, pulmonary embolism, perioperative medical risks (cardiac, pulmonary, renal, neurologic), and death and the patient elects to proceed. We will initiate pre-operative medical evaluation and clearance and set a provisional date for surgical intervention according to the patient's schedule.   I have discussed anticoagulation with aspirin and coumadin and in low risk patients I have recommended aspirin twice a day.  25 minutes was spent in direct consultation with the patient counselling her on the items listed above.    We discussed her wt and a wt loss plan.  She has lost over 20 lbs in the last year. BMI is elevated. Needs to consider wt loss. Needs TKA but need wt loss first    See back in 6 months     There are no Patient Instructions on file for this visit.      No orders of the defined types were placed in this encounter.        Procedures

## 2023-09-05 ENCOUNTER — OFFICE VISIT (OUTPATIENT)
Dept: FAMILY MEDICINE | Facility: CLINIC | Age: 63
End: 2023-09-05
Payer: COMMERCIAL

## 2023-09-05 VITALS
DIASTOLIC BLOOD PRESSURE: 96 MMHG | HEART RATE: 51 BPM | OXYGEN SATURATION: 99 % | WEIGHT: 293 LBS | TEMPERATURE: 98 F | BODY MASS INDEX: 45.99 KG/M2 | SYSTOLIC BLOOD PRESSURE: 161 MMHG | HEIGHT: 67 IN

## 2023-09-05 DIAGNOSIS — M10.9 GOUT, UNSPECIFIED CAUSE, UNSPECIFIED CHRONICITY, UNSPECIFIED SITE: ICD-10-CM

## 2023-09-05 DIAGNOSIS — E78.5 HYPERLIPIDEMIA, UNSPECIFIED HYPERLIPIDEMIA TYPE: ICD-10-CM

## 2023-09-05 DIAGNOSIS — I10 ESSENTIAL HYPERTENSION: ICD-10-CM

## 2023-09-05 DIAGNOSIS — Z13.1 SCREENING FOR DIABETES MELLITUS (DM): Primary | ICD-10-CM

## 2023-09-05 DIAGNOSIS — G89.29 OTHER CHRONIC PAIN: ICD-10-CM

## 2023-09-05 DIAGNOSIS — M79.604 LEG PAIN, BILATERAL: ICD-10-CM

## 2023-09-05 DIAGNOSIS — M19.90 ARTHRITIS: ICD-10-CM

## 2023-09-05 DIAGNOSIS — E03.9 HYPOTHYROIDISM, UNSPECIFIED TYPE: ICD-10-CM

## 2023-09-05 DIAGNOSIS — M79.605 LEG PAIN, BILATERAL: ICD-10-CM

## 2023-09-05 DIAGNOSIS — I10 HYPERTENSION, UNSPECIFIED TYPE: ICD-10-CM

## 2023-09-05 LAB
ALBUMIN SERPL BCP-MCNC: 3.6 G/DL (ref 3.5–5)
ALBUMIN/GLOB SERPL: 1.1 {RATIO}
ALP SERPL-CCNC: 58 U/L (ref 50–130)
ALT SERPL W P-5'-P-CCNC: 19 U/L (ref 13–56)
ANION GAP SERPL CALCULATED.3IONS-SCNC: 10 MMOL/L (ref 7–16)
AST SERPL W P-5'-P-CCNC: 18 U/L (ref 15–37)
BILIRUB SERPL-MCNC: 0.6 MG/DL (ref ?–1.2)
BUN SERPL-MCNC: 12 MG/DL (ref 7–18)
BUN/CREAT SERPL: 14 (ref 6–20)
CALCIUM SERPL-MCNC: 8.9 MG/DL (ref 8.5–10.1)
CHLORIDE SERPL-SCNC: 105 MMOL/L (ref 98–107)
CHOLEST SERPL-MCNC: 178 MG/DL (ref 0–200)
CHOLEST/HDLC SERPL: 2.4 {RATIO}
CO2 SERPL-SCNC: 33 MMOL/L (ref 21–32)
CREAT SERPL-MCNC: 0.83 MG/DL (ref 0.55–1.02)
EGFR (NO RACE VARIABLE) (RUSH/TITUS): 80 ML/MIN/1.73M2
EST. AVERAGE GLUCOSE BLD GHB EST-MCNC: 84 MG/DL
GLOBULIN SER-MCNC: 3.2 G/DL (ref 2–4)
GLUCOSE SERPL-MCNC: 92 MG/DL (ref 74–106)
HBA1C MFR BLD HPLC: 5.1 % (ref 4.5–6.6)
HDLC SERPL-MCNC: 74 MG/DL (ref 40–60)
LDLC SERPL CALC-MCNC: 94 MG/DL
LDLC/HDLC SERPL: 1.3 {RATIO}
NONHDLC SERPL-MCNC: 104 MG/DL
POTASSIUM SERPL-SCNC: 4.3 MMOL/L (ref 3.5–5.1)
PROT SERPL-MCNC: 6.8 G/DL (ref 6.4–8.2)
SODIUM SERPL-SCNC: 144 MMOL/L (ref 136–145)
TRIGL SERPL-MCNC: 52 MG/DL (ref 35–150)
TSH SERPL DL<=0.005 MIU/L-ACNC: 1.49 UIU/ML (ref 0.36–3.74)
VLDLC SERPL-MCNC: 10 MG/DL

## 2023-09-05 PROCEDURE — 90471 IMMUNIZATION ADMIN: CPT | Mod: GC,,, | Performed by: FAMILY MEDICINE

## 2023-09-05 PROCEDURE — 3077F SYST BP >= 140 MM HG: CPT | Mod: CPTII,,, | Performed by: FAMILY MEDICINE

## 2023-09-05 PROCEDURE — 1159F PR MEDICATION LIST DOCUMENTED IN MEDICAL RECORD: ICD-10-PCS | Mod: CPTII,,, | Performed by: FAMILY MEDICINE

## 2023-09-05 PROCEDURE — 90715 TDAP VACCINE 7 YRS/> IM: CPT | Mod: GC,,, | Performed by: FAMILY MEDICINE

## 2023-09-05 PROCEDURE — 84443 ASSAY THYROID STIM HORMONE: CPT | Mod: ,,, | Performed by: CLINICAL MEDICAL LABORATORY

## 2023-09-05 PROCEDURE — 3008F PR BODY MASS INDEX (BMI) DOCUMENTED: ICD-10-PCS | Mod: CPTII,,, | Performed by: FAMILY MEDICINE

## 2023-09-05 PROCEDURE — 90715 TDAP VACCINE GREATER THAN OR EQUAL TO 7YO IM: ICD-10-PCS | Mod: GC,,, | Performed by: FAMILY MEDICINE

## 2023-09-05 PROCEDURE — 80061 LIPID PANEL: ICD-10-PCS | Mod: ,,, | Performed by: CLINICAL MEDICAL LABORATORY

## 2023-09-05 PROCEDURE — 83036 HEMOGLOBIN A1C: ICD-10-PCS | Mod: GZ,,, | Performed by: CLINICAL MEDICAL LABORATORY

## 2023-09-05 PROCEDURE — 3080F PR MOST RECENT DIASTOLIC BLOOD PRESSURE >= 90 MM HG: ICD-10-PCS | Mod: CPTII,,, | Performed by: FAMILY MEDICINE

## 2023-09-05 PROCEDURE — 80053 COMPREHENSIVE METABOLIC PANEL: ICD-10-PCS | Mod: ,,, | Performed by: CLINICAL MEDICAL LABORATORY

## 2023-09-05 PROCEDURE — 1159F MED LIST DOCD IN RCRD: CPT | Mod: CPTII,,, | Performed by: FAMILY MEDICINE

## 2023-09-05 PROCEDURE — 99214 PR OFFICE/OUTPT VISIT, EST, LEVL IV, 30-39 MIN: ICD-10-PCS | Mod: 25,GC,, | Performed by: FAMILY MEDICINE

## 2023-09-05 PROCEDURE — 99214 OFFICE O/P EST MOD 30 MIN: CPT | Mod: 25,GC,, | Performed by: FAMILY MEDICINE

## 2023-09-05 PROCEDURE — 3008F BODY MASS INDEX DOCD: CPT | Mod: CPTII,,, | Performed by: FAMILY MEDICINE

## 2023-09-05 PROCEDURE — 1160F PR REVIEW ALL MEDS BY PRESCRIBER/CLIN PHARMACIST DOCUMENTED: ICD-10-PCS | Mod: CPTII,,, | Performed by: FAMILY MEDICINE

## 2023-09-05 PROCEDURE — 83036 HEMOGLOBIN GLYCOSYLATED A1C: CPT | Mod: GZ,,, | Performed by: CLINICAL MEDICAL LABORATORY

## 2023-09-05 PROCEDURE — 90471 TDAP VACCINE GREATER THAN OR EQUAL TO 7YO IM: ICD-10-PCS | Mod: GC,,, | Performed by: FAMILY MEDICINE

## 2023-09-05 PROCEDURE — 3080F DIAST BP >= 90 MM HG: CPT | Mod: CPTII,,, | Performed by: FAMILY MEDICINE

## 2023-09-05 PROCEDURE — 1160F RVW MEDS BY RX/DR IN RCRD: CPT | Mod: CPTII,,, | Performed by: FAMILY MEDICINE

## 2023-09-05 PROCEDURE — 3077F PR MOST RECENT SYSTOLIC BLOOD PRESSURE >= 140 MM HG: ICD-10-PCS | Mod: CPTII,,, | Performed by: FAMILY MEDICINE

## 2023-09-05 PROCEDURE — 80053 COMPREHEN METABOLIC PANEL: CPT | Mod: ,,, | Performed by: CLINICAL MEDICAL LABORATORY

## 2023-09-05 PROCEDURE — 80061 LIPID PANEL: CPT | Mod: ,,, | Performed by: CLINICAL MEDICAL LABORATORY

## 2023-09-05 PROCEDURE — 84443 TSH: ICD-10-PCS | Mod: ,,, | Performed by: CLINICAL MEDICAL LABORATORY

## 2023-09-05 RX ORDER — METHOCARBAMOL 750 MG/1
TABLET, FILM COATED ORAL
Qty: 30 TABLET | Refills: 4 | Status: SHIPPED | OUTPATIENT
Start: 2023-09-05 | End: 2024-04-03 | Stop reason: SDUPTHER

## 2023-09-05 RX ORDER — ATENOLOL AND CHLORTHALIDONE TABLET 50; 25 MG/1; MG/1
1 TABLET ORAL DAILY
Qty: 90 TABLET | Refills: 2 | Status: SHIPPED | OUTPATIENT
Start: 2023-09-05 | End: 2024-04-03 | Stop reason: SDUPTHER

## 2023-09-05 RX ORDER — LEVOTHYROXINE SODIUM 88 UG/1
88 TABLET ORAL
Qty: 90 TABLET | Refills: 1 | Status: SHIPPED | OUTPATIENT
Start: 2023-09-05 | End: 2024-02-29 | Stop reason: SDUPTHER

## 2023-09-05 RX ORDER — DICLOFENAC SODIUM 10 MG/G
GEL TOPICAL
Qty: 50 G | Refills: 3 | Status: SHIPPED | OUTPATIENT
Start: 2023-09-05 | End: 2024-01-10 | Stop reason: SDUPTHER

## 2023-09-05 RX ORDER — ALLOPURINOL 100 MG/1
100 TABLET ORAL DAILY
Qty: 90 TABLET | Refills: 2 | Status: SHIPPED | OUTPATIENT
Start: 2023-09-05 | End: 2024-04-03 | Stop reason: SDUPTHER

## 2023-09-05 RX ORDER — MELOXICAM 7.5 MG/1
7.5 TABLET ORAL DAILY
Qty: 30 TABLET | Refills: 2 | Status: SHIPPED | OUTPATIENT
Start: 2023-09-05 | End: 2024-01-10 | Stop reason: SDUPTHER

## 2023-09-05 NOTE — ASSESSMENT & PLAN NOTE
09/05  - Refilled medications  -Ordered TSH levels will discuss with patient once results are available

## 2023-09-05 NOTE — PROGRESS NOTES
I have reviewed the notes, assessments, and/or procedures performed by Dr Rouse, I concur with his documentation of Angela Walker.

## 2023-09-05 NOTE — ASSESSMENT & PLAN NOTE
09/05  - Follow-up with orthopedic for knee surgery  -Plan for TKA once upon weight loss  -Continue pain management medications and exercise

## 2023-09-05 NOTE — PROGRESS NOTES
Subjective:       Patient ID: Angela Landaverde is a 63 y.o. female.    Chief Complaint: Medication Refill (Thyroid meloxicam atenolol alopurinol diclofenac )    Angela Landaverde is a 63-year-old  female with a past medical history of HLD, HTN, Hypothyroidism, and OA who presents to clinic for medication refills and lab work. Reports she is feeling well with the exception of bilaterally knee pain. She is seeing a orthopedics for chronic knee pain and states will have knee replacement once weight loss. At today's visit patient had elevated blood pressure of 161/96; states that she did not take blood pressure medications today. She does not check blood pressure at home; encouraged patient to check blood pressure at home with a log and to bring back at next visit. Patient states that her diet is poor with eating unhealthy food. Informed patient to work on diet with more protein, veggies and limited sodium in-take. Printed out DASH diet. She was concerned with her home medications Meloxicam interacting with Robaxin and Daypro; Informed patient that Meloxicam does have a few complications with Daypro; discontinued Daypro and continue Robaxin since no drug interactions.      Discussed health screening with patient. Patient is scheduled to have a Pap smear and mammogram in November with her OBGYN. She never had a colonoscopy but asked to have a FOBT. Patient will come back in next week to give a stool sample. Ordered Tetanus vaccine today; she refused Zoster Shingle vaccine. Ordered A1c for diabetes screening, Lipid panel, TSH, and CMP will discuss results with patient once available via telephone. She will RTC in 3-months for annual physical and HTN management. Denies angina, dyspnea, HA, or abdominal discomfort.          Current Outpatient Medications:     allopurinoL (ZYLOPRIM) 100 MG tablet, Take 1 tablet (100 mg total) by mouth once daily., Disp: 90 tablet, Rfl: 2    atenoloL-chlorthalidone (TENORETIC) 50-25 mg  Tab, Take 1 tablet by mouth once daily., Disp: 90 tablet, Rfl: 2    diclofenac sodium (VOLTAREN) 1 % Gel, APPLY 2 GRAMS TO AFFECTED AREAS EVERY 6 HOURS AS NEEDED, Disp: 50 g, Rfl: 3    gabapentin (NEURONTIN) 100 MG capsule, Take 3 capsules (300 mg total) by mouth daily as needed (pain)., Disp: 90 capsule, Rfl: 1    levothyroxine (SYNTHROID) 88 MCG tablet, Take 1 tablet (88 mcg total) by mouth before breakfast., Disp: 90 tablet, Rfl: 1    meloxicam (MOBIC) 7.5 MG tablet, Take 1 tablet (7.5 mg total) by mouth once daily., Disp: 30 tablet, Rfl: 2    methocarbamoL (ROBAXIN) 750 MG Tab, TAKE 1 TABLET BY MOUTH 4 TIMES DAILY AS NEEDED, Disp: 30 tablet, Rfl: 4    nystatin-triamcinolone (MYCOLOG II) cream, Apply 1 each topically., Disp: , Rfl:     Review of patient's allergies indicates:  No Known Allergies    Past Medical History:   Diagnosis Date    Arthritis     Hypertension     Thyroid disease        Past Surgical History:   Procedure Laterality Date     SECTION      TUBAL LIGATION         Family History   Problem Relation Age of Onset    Cancer Mother     Heart disease Father     Cancer Brother     Cancer Brother        Social History     Tobacco Use    Smoking status: Never    Smokeless tobacco: Never   Substance Use Topics    Alcohol use: Not Currently     Comment: occasionally    Drug use: Never       Review of Systems   Constitutional:  Negative for activity change and appetite change.   Respiratory:  Negative for cough, chest tightness and wheezing.    Cardiovascular:  Negative for chest pain and leg swelling.   Gastrointestinal:  Negative for abdominal distention and abdominal pain.   Neurological:  Negative for weakness and headaches.       Current Medications:   Medication List with Changes/Refills   Current Medications    GABAPENTIN (NEURONTIN) 100 MG CAPSULE    Take 3 capsules (300 mg total) by mouth daily as needed (pain).       Start Date: 2022 End Date: 2023    NYSTATIN-TRIAMCINOLONE  (MYCOLOG II) CREAM    Apply 1 each topically.       Start Date: 6/21/2022 End Date: --   Changed and/or Refilled Medications    Modified Medication Previous Medication    ALLOPURINOL (ZYLOPRIM) 100 MG TABLET allopurinoL (ZYLOPRIM) 100 MG tablet       Take 1 tablet (100 mg total) by mouth once daily.    Take 1 tablet (100 mg total) by mouth once daily.       Start Date: 9/5/2023  End Date: --    Start Date: 8/30/2022 End Date: 9/5/2023    ATENOLOL-CHLORTHALIDONE (TENORETIC) 50-25 MG TAB atenoloL-chlorthalidone (TENORETIC) 50-25 mg Tab       Take 1 tablet by mouth once daily.    Take 1 tablet by mouth once daily.       Start Date: 9/5/2023  End Date: --    Start Date: 8/30/2022 End Date: 9/5/2023    DICLOFENAC SODIUM (VOLTAREN) 1 % GEL diclofenac sodium (VOLTAREN) 1 % Gel       APPLY 2 GRAMS TO AFFECTED AREAS EVERY 6 HOURS AS NEEDED    APPLY 2 GRAMS TO AFFECTED AREAS EVERY 6 HOURS AS NEEDED       Start Date: 9/5/2023  End Date: --    Start Date: 3/30/2023 End Date: 9/5/2023    LEVOTHYROXINE (SYNTHROID) 88 MCG TABLET levothyroxine (SYNTHROID) 88 MCG tablet       Take 1 tablet (88 mcg total) by mouth before breakfast.    Take 1 tablet (88 mcg total) by mouth before breakfast.       Start Date: 9/5/2023  End Date: --    Start Date: 3/7/2023  End Date: 9/5/2023    MELOXICAM (MOBIC) 7.5 MG TABLET meloxicam (MOBIC) 7.5 MG tablet       Take 1 tablet (7.5 mg total) by mouth once daily.    Take 1 tablet (7.5 mg total) by mouth once daily.       Start Date: 9/5/2023  End Date: --    Start Date: 10/12/2022End Date: 9/5/2023    METHOCARBAMOL (ROBAXIN) 750 MG TAB methocarbamoL (ROBAXIN) 750 MG Tab       TAKE 1 TABLET BY MOUTH 4 TIMES DAILY AS NEEDED    TAKE 1 TABLET BY MOUTH 4 TIMES DAILY AS NEEDED       Start Date: 9/5/2023  End Date: --    Start Date: 2/16/2022 End Date: 9/5/2023   Discontinued Medications    OXAPROZIN (DAYPRO) 600 MG TABLET    Take 1 tablet (600 mg total) by mouth 2 (two) times daily as needed (pain).        "Start Date: 8/30/2022 End Date: 9/5/2023            Objective:        Vitals:    09/05/23 1313 09/05/23 1315   BP: (!) 166/76 (!) 161/96   BP Location: Right forearm Right arm   Patient Position: Sitting Sitting   BP Method: Medium (Automatic) Large (Automatic)   Pulse: (!) 47 (!) 51   Temp: 97.9 °F (36.6 °C)    TempSrc: Oral    SpO2: 99%    Weight:  133.6 kg (294 lb 9.6 oz)   Height: 5' 7" (1.702 m)        Physical Exam  Vitals and nursing note reviewed.   Constitutional:       General: She is not in acute distress.     Appearance: Normal appearance. She is obese. She is not ill-appearing.   HENT:      Head: Normocephalic and atraumatic.   Eyes:      Extraocular Movements: Extraocular movements intact.      Pupils: Pupils are equal, round, and reactive to light.   Cardiovascular:      Rate and Rhythm: Bradycardia present.      Heart sounds: No murmur heard.     No friction rub.   Pulmonary:      Effort: No respiratory distress.      Breath sounds: No wheezing or rhonchi.   Chest:      Chest wall: No tenderness.   Abdominal:      General: There is no distension.      Palpations: There is no mass.      Tenderness: There is no abdominal tenderness.   Musculoskeletal:         General: Tenderness present.      Right lower leg: No edema.      Left lower leg: No edema.      Comments: Bilaterally knee tenderness   Skin:     Coloration: Skin is not pale.      Findings: No bruising, erythema, lesion or rash.   Neurological:      Mental Status: She is alert and oriented to person, place, and time.   Psychiatric:         Mood and Affect: Mood normal.         Behavior: Behavior normal.           Lab Results   Component Value Date    WBC 7.20 01/30/2023    HGB 12.5 01/30/2023    HCT 38.9 01/30/2023     01/30/2023    CHOL 184 02/16/2022    TRIG 47 02/16/2022    HDL 75 (H) 02/16/2022    ALT 23 01/30/2023    AST 17 01/30/2023     01/30/2023    K 4.2 01/30/2023     01/30/2023    CREATININE 0.74 01/30/2023    BUN " 14 01/30/2023    CO2 33 (H) 01/30/2023    TSH 1.860 11/07/2022      Assessment:       1. Screening for diabetes mellitus (DM)    2. Hyperlipidemia, unspecified hyperlipidemia type    3. Hypothyroidism, unspecified type    4. Gout, unspecified cause, unspecified chronicity, unspecified site    5. Essential hypertension    6. Arthritis    7. Other chronic pain    8. Leg pain, bilateral    9. Hypertension, unspecified type        Plan:         Problem List Items Addressed This Visit          Cardiac/Vascular    Hyperlipidemia     09/05  - Ordered lipid panel; awaiting results  -Continue home medications statins           Relevant Orders    Lipid Panel    HTN (hypertension)     09/05  - Blood pressure 161/96; pt reports not taking medications today due to doctors visit  -Educated patient with compliance of medications take even on day go to the [facility] office  -Recommended DASH diet; patient was given print-out and explained more fruits and veggies less salt  -Will continue to monitor closely goal 130/80 for her age              Endocrine    Hypothyroid     09/05  - Refilled medications  -Ordered TSH levels will discuss with patient once results are available            Relevant Medications    levothyroxine (SYNTHROID) 88 MCG tablet    Other Relevant Orders    TSH       Orthopedic    Arthritis    Relevant Medications    diclofenac sodium (VOLTAREN) 1 % Gel    meloxicam (MOBIC) 7.5 MG tablet    Leg pain, bilateral     09/05  - Follow-up with orthopedic for knee surgery  -Plan for TKA once upon weight loss  -Continue pain management medications and exercise               Other Visit Diagnoses       Screening for diabetes mellitus (DM)    -  Primary    Relevant Orders    Hemoglobin A1C    Comprehensive Metabolic Panel    Gout, unspecified cause, unspecified chronicity, unspecified site        Relevant Medications    allopurinoL (ZYLOPRIM) 100 MG tablet    Essential hypertension        Relevant Medications     atenoloL-chlorthalidone (TENORETIC) 50-25 mg Tab    Other chronic pain        Relevant Medications    methocarbamoL (ROBAXIN) 750 MG Tab              Follow up in about 3 months (around 12/5/2023).    Kirk Rouse MD     Instructed patient that if symptoms fail to improve or worsen patient should seek immediate medical attention or report to the nearest emergency department. Patient expressed verbal agreement and understanding to this plan of care.

## 2023-09-05 NOTE — ASSESSMENT & PLAN NOTE
09/05  - Blood pressure 161/96; pt reports not taking medications today due to doctors visit  -Educated patient with compliant of medications  -Recommended DASH diet; patient was given print-out  -Will continue to monitor closely

## 2023-09-11 NOTE — PROGRESS NOTES
Spoke with patient via telephone to discuss labs; Patient understand the lab results and will follow-up in three months.

## 2023-09-27 ENCOUNTER — TELEPHONE (OUTPATIENT)
Dept: ORTHOPEDICS | Facility: CLINIC | Age: 63
End: 2023-09-27
Payer: COMMERCIAL

## 2023-09-27 NOTE — TELEPHONE ENCOUNTER
----- Message from Lili Smalls sent at 9/27/2023 11:33 AM CDT -----  Pt calling to speak with someone about setting up knee surgery - pt is in the process of almost being approved for financial assistance - pt call back # [phone]

## 2023-11-15 ENCOUNTER — HOSPITAL ENCOUNTER (OUTPATIENT)
Dept: RADIOLOGY | Facility: HOSPITAL | Age: 63
Discharge: HOME | End: 2023-11-15
Attending: FAMILY MEDICINE
Payer: COMMERCIAL

## 2023-11-15 VITALS — HEIGHT: 67 IN | BODY MASS INDEX: 45.99 KG/M2 | WEIGHT: 293 LBS

## 2023-11-15 DIAGNOSIS — Z12.31 OTHER SCREENING MAMMOGRAM: ICD-10-CM

## 2023-11-15 PROCEDURE — 77067 SCR MAMMO BI INCL CAD: CPT | Mod: TC

## 2023-12-13 ENCOUNTER — OFFICE VISIT (OUTPATIENT)
Dept: FAMILY MEDICINE | Facility: CLINIC | Age: 63
End: 2023-12-13
Payer: COMMERCIAL

## 2023-12-13 VITALS
HEIGHT: 67 IN | SYSTOLIC BLOOD PRESSURE: 160 MMHG | RESPIRATION RATE: 20 BRPM | WEIGHT: 293 LBS | HEART RATE: 80 BPM | OXYGEN SATURATION: 99 % | BODY MASS INDEX: 45.99 KG/M2 | DIASTOLIC BLOOD PRESSURE: 90 MMHG

## 2023-12-13 DIAGNOSIS — Z23 NEEDS FLU SHOT: ICD-10-CM

## 2023-12-13 DIAGNOSIS — I10 PRIMARY HYPERTENSION: Primary | ICD-10-CM

## 2023-12-13 PROCEDURE — 90471 IMMUNIZATION ADMIN: CPT | Mod: ,,, | Performed by: FAMILY MEDICINE

## 2023-12-13 PROCEDURE — 3008F BODY MASS INDEX DOCD: CPT | Mod: CPTII,,, | Performed by: FAMILY MEDICINE

## 2023-12-13 PROCEDURE — 1160F RVW MEDS BY RX/DR IN RCRD: CPT | Mod: CPTII,,, | Performed by: FAMILY MEDICINE

## 2023-12-13 PROCEDURE — 1159F MED LIST DOCD IN RCRD: CPT | Mod: CPTII,,, | Performed by: FAMILY MEDICINE

## 2023-12-13 PROCEDURE — 3077F SYST BP >= 140 MM HG: CPT | Mod: CPTII,,, | Performed by: FAMILY MEDICINE

## 2023-12-13 PROCEDURE — 99214 OFFICE O/P EST MOD 30 MIN: CPT | Mod: 25,,, | Performed by: FAMILY MEDICINE

## 2023-12-13 PROCEDURE — 3080F DIAST BP >= 90 MM HG: CPT | Mod: CPTII,,, | Performed by: FAMILY MEDICINE

## 2023-12-13 PROCEDURE — 90686 IIV4 VACC NO PRSV 0.5 ML IM: CPT | Mod: ,,, | Performed by: FAMILY MEDICINE

## 2023-12-13 NOTE — PROGRESS NOTES
[facility]    Chief Complaint      Chief Complaint   Patient presents with    Follow-up    Hypertension     Weight Visit       History of Present Illness      Angela Landaverde is a 63 y.o. female with chronic conditions of  has a past medical history of Arthritis, Hypertension, and Thyroid disease.          HPI  The patient presents today for  weight management.       Past Medical History:  Past Medical History:   Diagnosis Date    Arthritis     Hypertension     Thyroid disease        Past Surgical History:   has a past surgical history that includes  section and Tubal ligation.    Social History:  Social History     Tobacco Use    Smoking status: Never    Smokeless tobacco: Never   Substance Use Topics    Alcohol use: Not Currently     Comment: occasionally    Drug use: Never       I personally reviewed all past medical, surgical, and social.     Review of Systems   Constitutional:  Negative for chills and fever.   HENT:  Negative for ear pain and sore throat.    Eyes:  Negative for blurred vision.   Respiratory:  Negative for cough and shortness of breath.    Cardiovascular:  Negative for chest pain and palpitations.   Gastrointestinal:  Negative for abdominal pain and constipation.   Genitourinary:  Negative for dysuria and hematuria.   Musculoskeletal:  Negative for back pain and falls.   Skin:  Negative for itching and rash.   Neurological:  Negative for weakness and headaches.   Endo/Heme/Allergies:  Negative for polydipsia. Does not bruise/bleed easily.   Psychiatric/Behavioral:  Negative for suicidal ideas. The patient does not have insomnia.         Medications:  Outpatient Encounter Medications as of 2023   Medication Sig Dispense Refill    allopurinoL (ZYLOPRIM) 100 MG tablet Take 1 tablet (100 mg total) by mouth once daily. 90 tablet 2    atenoloL-chlorthalidone (TENORETIC) 50-25 mg Tab Take 1 tablet by mouth once daily. 90 tablet 2    diclofenac sodium (VOLTAREN) 1 % Gel APPLY 2 GRAMS  "TO AFFECTED AREAS EVERY 6 HOURS AS NEEDED 50 g 3    levothyroxine (SYNTHROID) 88 MCG tablet Take 1 tablet (88 mcg total) by mouth before breakfast. 90 tablet 1    meloxicam (MOBIC) 7.5 MG tablet Take 1 tablet (7.5 mg total) by mouth once daily. 30 tablet 2    methocarbamoL (ROBAXIN) 750 MG Tab TAKE 1 TABLET BY MOUTH 4 TIMES DAILY AS NEEDED 30 tablet 4    gabapentin (NEURONTIN) 100 MG capsule Take 3 capsules (300 mg total) by mouth daily as needed (pain). 90 capsule 1    nystatin-triamcinolone (MYCOLOG II) cream Apply 1 each topically.       No facility-administered encounter medications on file as of 12/13/2023.       Allergies:  Review of patient's allergies indicates:  No Known Allergies    Health Maintenance:  Immunization History   Administered Date(s) Administered    COVID-19, MRNA, LN-S, PF (Pfizer) (Purple Cap) 05/07/2021, 05/28/2021    Influenza - Quadrivalent - PF *Preferred* (6 months and older) 12/13/2023    Tdap 09/05/2023      Health Maintenance   Topic Date Due    Shingles Vaccine (1 of 2) Never done    Colorectal Cancer Screening  08/05/2023    Mammogram  11/15/2024    Lipid Panel  09/05/2028    TETANUS VACCINE  09/05/2033    Hepatitis C Screening  Completed        Physical Exam      Vital Signs  Pulse: 80  Resp: 20  SpO2: 99 %  BP: (!) 160/90  Height and Weight  Height: 5' 7" (170.2 cm)  Weight: 134.3 kg (296 lb)  BSA (Calculated - sq m): 2.52 sq meters  BMI (Calculated): 46.3  Weight in (lb) to have BMI = 25: 159.3]    Physical Exam  Vitals and nursing note reviewed.   Constitutional:       Appearance: Normal appearance.   HENT:      Head: Normocephalic and atraumatic.   Cardiovascular:      Rate and Rhythm: Normal rate and regular rhythm.      Heart sounds: Normal heart sounds.   Pulmonary:      Effort: Pulmonary effort is normal.      Breath sounds: Normal breath sounds.   Skin:     Findings: No rash.   Neurological:      General: No focal deficit present.      Mental Status: She is alert and " oriented to person, place, and time.      Gait: Gait normal.   Psychiatric:         Mood and Affect: Mood normal.         Behavior: Behavior normal.         Thought Content: Thought content normal.         Judgment: Judgment normal.          Laboratory:  CBC:  Recent Labs   Lab 01/30/23  1446   WBC 7.20   RBC 4.34   Hemoglobin 12.5   Hematocrit 38.9   Platelet Count 260   MCV 89.6   MCH 28.8   MCHC 32.1     CMP:  Recent Labs   Lab 08/30/22  0848 01/30/23  1446 09/05/23  1351   Glucose 92 97 92   Calcium 9.6 9.3 8.9   Albumin 4.0 3.8 3.6   Total Protein 7.3 7.0 6.8   Sodium 140 143 144   Potassium 4.5 4.2 4.3   CO2 31 33 H 33 H   Chloride 102 104 105   BUN 14 14 12   Alk Phos 62 75 58   ALT 24 23 19   AST 20 17 18   Bilirubin, Total 0.7 0.6 0.6     LIPIDS:  Recent Labs   Lab 02/16/22  0914 08/30/22  0848 11/07/22  1300 09/05/23  1351   TSH 3.210 4.440 H 1.860 1.490   HDL Cholesterol 75 H  --   --  74 H   Cholesterol 184  --   --  178   Triglycerides 47  --   --  52   LDL Calculated 100  --   --  94   Cholesterol/HDL Ratio (Risk Factor) 2.5  --   --  2.4   Non-  --   --  104     TSH:  Recent Labs   Lab 08/30/22  0848 11/07/22  1300 09/05/23  1351   TSH 4.440 H 1.860 1.490     A1C:  Recent Labs   Lab 09/05/23  1351   Hemoglobin A1C 5.1       Assessment/Plan     Angela Landaverde is a 63 y.o.female with:    1. Primary hypertension  - DASH diet  -continue current meds    2. Needs flu shot  -     Influenza - Quadrivalent (PF)    3. BMI 40.0-44.9, adult  -diet/exercise  -Ozempic sample      Chronic conditions status updated as per HPI.  Other than changes above, cont current medications and maintain follow up with specialists.  Return to clinic in 1 month(s) for weight management.    Joana Parmar DO  [facility]

## 2023-12-17 ENCOUNTER — TELEPHONE (OUTPATIENT)
Dept: FAMILY MEDICINE | Facility: CLINIC | Age: 63
End: 2023-12-17
Payer: COMMERCIAL

## 2023-12-17 NOTE — TELEPHONE ENCOUNTER
----- Message from Mary Arthur sent at 12/15/2023  8:26 AM CST -----  Regarding: meds  Pt called,  saw Dr Parmar Wednesday.  Was supposed to send in scripts for Methocarbamol and Ozempic but it was not sent in.  Would [facility] be able to fill it.  [phone].  Charla Arcosn.

## 2023-12-18 DIAGNOSIS — G89.29 OTHER CHRONIC PAIN: ICD-10-CM

## 2023-12-19 RX ORDER — METHOCARBAMOL 750 MG/1
TABLET, FILM COATED ORAL
Qty: 30 TABLET | Refills: 4 | OUTPATIENT
Start: 2023-12-19

## 2024-01-09 ENCOUNTER — TELEPHONE (OUTPATIENT)
Dept: ORTHOPEDICS | Facility: CLINIC | Age: 64
End: 2024-01-09
Payer: COMMERCIAL

## 2024-01-09 NOTE — TELEPHONE ENCOUNTER
----- Message from Saige Lopez sent at 1/9/2024  2:30 PM CST -----  Regarding: APPOINTMENT  PATIENT CALLING TO SCHEDULE SURGERY, AND SHE HAS BEE APPROVED BY [facility], CALL BACK NUMBER [phone]

## 2024-01-10 ENCOUNTER — TELEPHONE (OUTPATIENT)
Dept: ORTHOPEDICS | Facility: CLINIC | Age: 64
End: 2024-01-10
Payer: COMMERCIAL

## 2024-01-10 ENCOUNTER — OFFICE VISIT (OUTPATIENT)
Dept: FAMILY MEDICINE | Facility: CLINIC | Age: 64
End: 2024-01-10
Payer: COMMERCIAL

## 2024-01-10 VITALS
OXYGEN SATURATION: 98 % | WEIGHT: 282 LBS | HEART RATE: 62 BPM | DIASTOLIC BLOOD PRESSURE: 82 MMHG | SYSTOLIC BLOOD PRESSURE: 130 MMHG | HEIGHT: 67 IN | BODY MASS INDEX: 44.26 KG/M2

## 2024-01-10 DIAGNOSIS — M19.90 ARTHRITIS: Primary | ICD-10-CM

## 2024-01-10 PROCEDURE — 1159F MED LIST DOCD IN RCRD: CPT | Mod: CPTII,,, | Performed by: FAMILY MEDICINE

## 2024-01-10 PROCEDURE — 99213 OFFICE O/P EST LOW 20 MIN: CPT | Mod: ,,, | Performed by: FAMILY MEDICINE

## 2024-01-10 PROCEDURE — 1160F RVW MEDS BY RX/DR IN RCRD: CPT | Mod: CPTII,,, | Performed by: FAMILY MEDICINE

## 2024-01-10 PROCEDURE — 3075F SYST BP GE 130 - 139MM HG: CPT | Mod: CPTII,,, | Performed by: FAMILY MEDICINE

## 2024-01-10 PROCEDURE — 3008F BODY MASS INDEX DOCD: CPT | Mod: CPTII,,, | Performed by: FAMILY MEDICINE

## 2024-01-10 PROCEDURE — 3079F DIAST BP 80-89 MM HG: CPT | Mod: CPTII,,, | Performed by: FAMILY MEDICINE

## 2024-01-10 RX ORDER — DICLOFENAC SODIUM 10 MG/G
GEL TOPICAL
Qty: 50 G | Refills: 3 | Status: SHIPPED | OUTPATIENT
Start: 2024-01-10

## 2024-01-10 RX ORDER — MELOXICAM 7.5 MG/1
7.5 TABLET ORAL DAILY
Qty: 30 TABLET | Refills: 2 | Status: SHIPPED | OUTPATIENT
Start: 2024-01-10

## 2024-01-10 NOTE — PROGRESS NOTES
[facility]    Chief Complaint      Chief Complaint   Patient presents with    Follow-up     1 month        History of Present Illness      Angela Landaverde is a 63 y.o. female with chronic conditions of  has a past medical history of Arthritis, Hypertension, and Thyroid disease.      The patient presents today for a one month follow up visit for weight management.She has no complaints    HPI    Past Medical History:  Past Medical History:   Diagnosis Date    Arthritis     Hypertension     Thyroid disease        Past Surgical History:   has a past surgical history that includes  section and Tubal ligation.    Social History:  Social History     Tobacco Use    Smoking status: Never    Smokeless tobacco: Never   Substance Use Topics    Alcohol use: Not Currently     Comment: occasionally    Drug use: Never       I personally reviewed all past medical, surgical, and social.     Review of Systems   Constitutional:  Negative for chills and fever.   HENT:  Negative for ear pain and sore throat.    Eyes:  Negative for blurred vision.   Respiratory:  Negative for cough and shortness of breath.    Cardiovascular:  Negative for chest pain and palpitations.   Gastrointestinal:  Negative for abdominal pain and constipation.   Genitourinary:  Negative for dysuria and hematuria.   Musculoskeletal:  Negative for back pain and falls.   Skin:  Negative for itching and rash.   Neurological:  Negative for weakness and headaches.   Endo/Heme/Allergies:  Negative for polydipsia. Does not bruise/bleed easily.   Psychiatric/Behavioral:  Negative for suicidal ideas. The patient does not have insomnia.         Medications:  Outpatient Encounter Medications as of 1/10/2024   Medication Sig Dispense Refill    allopurinoL (ZYLOPRIM) 100 MG tablet Take 1 tablet (100 mg total) by mouth once daily. 90 tablet 2    atenoloL-chlorthalidone (TENORETIC) 50-25 mg Tab Take 1 tablet by mouth once daily. 90 tablet 2    diclofenac sodium  "(VOLTAREN) 1 % Gel APPLY 2 GRAMS TO AFFECTED AREAS EVERY 6 HOURS AS NEEDED 50 g 3    levothyroxine (SYNTHROID) 88 MCG tablet Take 1 tablet (88 mcg total) by mouth before breakfast. 90 tablet 1    meloxicam (MOBIC) 7.5 MG tablet Take 1 tablet (7.5 mg total) by mouth once daily. 30 tablet 2    methocarbamoL (ROBAXIN) 750 MG Tab TAKE 1 TABLET BY MOUTH 4 TIMES DAILY AS NEEDED 30 tablet 4    nystatin-triamcinolone (MYCOLOG II) cream Apply 1 each topically.      gabapentin (NEURONTIN) 100 MG capsule Take 3 capsules (300 mg total) by mouth daily as needed (pain). 90 capsule 1     No facility-administered encounter medications on file as of 1/10/2024.       Allergies:  Review of patient's allergies indicates:  No Known Allergies    Health Maintenance:  Immunization History   Administered Date(s) Administered    COVID-19 MRNA, LN-S PF (MODERNA HALF 0.25 ML DOSE) 01/06/2022    COVID-19, MRNA, LN-S, PF (Pfizer) (Purple Cap) 05/07/2021, 05/28/2021    Influenza - Quadrivalent - PF *Preferred* (6 months and older) 12/13/2023    Tdap 09/05/2023      Health Maintenance   Topic Date Due    Shingles Vaccine (1 of 2) Never done    Colorectal Cancer Screening  08/05/2023    Mammogram  11/15/2024    Lipid Panel  09/05/2028    TETANUS VACCINE  09/05/2033    Hepatitis C Screening  Completed        Physical Exam      Vital Signs  Pulse: 62  SpO2: 98 %  BP: 130/82  BP Location: Right arm  Patient Position: Sitting  Height and Weight  Height: 5' 7" (170.2 cm)  Weight: 127.9 kg (282 lb)  BSA (Calculated - sq m): 2.46 sq meters  BMI (Calculated): 44.2  Weight in (lb) to have BMI = 25: 159.3]    Physical Exam  Vitals and nursing note reviewed.   Constitutional:       Appearance: Normal appearance.   HENT:      Head: Normocephalic and atraumatic.   Cardiovascular:      Rate and Rhythm: Normal rate and regular rhythm.      Heart sounds: Normal heart sounds.   Pulmonary:      Effort: Pulmonary effort is normal.      Breath sounds: Normal breath " sounds.   Skin:     Findings: No rash.   Neurological:      General: No focal deficit present.      Mental Status: She is alert and oriented to person, place, and time.      Gait: Gait normal.   Psychiatric:         Mood and Affect: Mood normal.         Behavior: Behavior normal.         Thought Content: Thought content normal.         Judgment: Judgment normal.          Laboratory:  CBC:  Recent Labs   Lab 01/30/23  1446   WBC 7.20   RBC 4.34   Hemoglobin 12.5   Hematocrit 38.9   Platelet Count 260   MCV 89.6   MCH 28.8   MCHC 32.1     CMP:  Recent Labs   Lab 08/30/22  0848 01/30/23  1446 09/05/23  1351   Glucose 92 97 92   Calcium 9.6 9.3 8.9   Albumin 4.0 3.8 3.6   Total Protein 7.3 7.0 6.8   Sodium 140 143 144   Potassium 4.5 4.2 4.3   CO2 31 33 H 33 H   Chloride 102 104 105   BUN 14 14 12   Alk Phos 62 75 58   ALT 24 23 19   AST 20 17 18   Bilirubin, Total 0.7 0.6 0.6     LIPIDS:  Recent Labs   Lab 02/16/22  0914 08/30/22  0848 11/07/22  1300 09/05/23  1351   TSH 3.210 4.440 H 1.860 1.490   HDL Cholesterol 75 H  --   --  74 H   Cholesterol 184  --   --  178   Triglycerides 47  --   --  52   LDL Calculated 100  --   --  94   Cholesterol/HDL Ratio (Risk Factor) 2.5  --   --  2.4   Non-  --   --  104     TSH:  Recent Labs   Lab 08/30/22  0848 11/07/22  1300 09/05/23  1351   TSH 4.440 H 1.860 1.490     A1C:  Recent Labs   Lab 09/05/23  1351   Hemoglobin A1C 5.1       Assessment/Plan     Angela Landaverde is a 63 y.o.female with:    1. Arthritis  -     meloxicam (MOBIC) 7.5 MG tablet; Take 1 tablet (7.5 mg total) by mouth once daily.  Dispense: 30 tablet; Refill: 2  -     diclofenac sodium (VOLTAREN) 1 % Gel; APPLY 2 GRAMS TO AFFECTED AREAS EVERY 6 HOURS AS NEEDED  Dispense: 50 g; Refill: 3    2. BMI 40.0-44.9, adult        Chronic conditions status updated as per HPI.  Other than changes above, cont current medications and maintain follow up with specialists.  Return to clinic in 1 month(s) for weight  management.    Joana Parmar DO  [facility]

## 2024-01-12 ENCOUNTER — PATIENT OUTREACH (OUTPATIENT)
Dept: ADMINISTRATIVE | Facility: HOSPITAL | Age: 64
End: 2024-01-12

## 2024-01-12 NOTE — PROGRESS NOTES
Health maintenance record review for population health care gaps    Population Health Chart Review & Patient Outreach Details      Further Action Needed If Patient Returns Outreach:        SEVERIANO sent to  unable to send sign consent due to failure to sign on check in     Updates Requested / Reviewed:     []  Care Everywhere    []     []  External Sources ([facility], [facility], [facility], etc.)    [] [facility]   [] [facility]   [] Other:    [x]  Care Team Updated   []  Removed  or Duplicate Orders   []  Immunization Reconciliation Completed / Queried    [] [place]   [] [place]   [] [place]   [] [place]      Health Maintenance Topics Addressed and Outreach Outcomes / Actions Taken:             Breast Cancer Screening []  Mammogram Order Placed    []  Mammogram Screening Scheduled    []  External Records Requested & Care Team Updated if Applicable    []  External Records Uploaded & Care Team Updated if Applicable    []  Pt Declined Scheduling Mammogram    []  Pt Will Schedule with External Provider / Order Routed & Care Team Updated if Applicable              Cervical Cancer Screening []  Pap Smear Scheduled in Primary Care or OBGYN    [x]  External Records Requested & Care Team Updated if Applicable       []  External Records Uploaded, Care Team Updated, & History Updated if Applicable    []  Patient Declined Scheduling Pap Smear    []  Patient Will Schedule with External Provider & Care Team Updated if Applicable                  Colorectal Cancer Screening []  Colonoscopy Case Request / Referral / Home Test Order Placed    []  External Records Requested & Care Team Updated if Applicable    []  External Records Uploaded, Care Team Updated, & History Updated if Applicable    []  Patient Declined Completing Colon Cancer Screening    []  Patient Will Schedule with External Provider & Care Team Updated if Applicable    []  Fit Kit Mailed (add the SmartPhrase under additional notes)    []  Reminded Patient to  Complete Home Test                Diabetic Eye Exam []  Eye Exam Screening Order Placed    []  Eye Camera Scheduled or Optometry/Ophthalmology Referral Placed    []  External Records Requested & Care Team Updated if Applicable    []  External Records Uploaded, Care Team Updated, & History Updated if Applicable    []  Patient Declined Scheduling Eye Exam    []  Patient Will Schedule with External Provider & Care Team Updated if Applicable             Blood Pressure Control []  Primary Care Follow Up Visit Scheduled     []  Remote Blood Pressure Reading Captured    []  Patient Declined Remote Reading or Scheduling Appt - Escalated to PCP    []  Patient Will Call Back or Send Portal Message with Reading                 HbA1c & Other Labs []  Overdue Lab(s) Ordered    []  Overdue Lab(s) Scheduled    []  External Records Uploaded & Care Team Updated if Applicable    []  Primary Care Follow Up Visit Scheduled     []  Reminded Patient to Complete A1c Home Test    []  Patient Declined Scheduling Labs or Will Call Back to Schedule    []  Patient Will Schedule with External Provider / Order Routed, & Care Team Updated if Applicable           Primary Care Appointment []  Primary Care Appt Scheduled    []  Patient Declined Scheduling or Will Call Back to Schedule    []  Pt Established with External Provider, Updated Care Team, & Informed Pt to Notify Payor if Applicable           Medication Adherence /    Statin Use []  Primary Care Appointment Scheduled    []  Patient Reminded to  Prescription    []  Patient Declined, Provider Notified if Needed    []  Sent Provider Message to Review to Evaluate Pt for Statin, Add Exclusion Dx Codes, Document   Exclusion in Problem List, Change Statin Intensity Level to Moderate or High Intensity if Applicable                Osteoporosis Screening []  Dexa Order Placed    []  Dexa Appointment Scheduled    []  External Records Requested & Care Team Updated    []  External Records  Uploaded, Care Team Updated, & History Updated if Applicable    []  Patient Declined Scheduling Dexa or Will Call Back to Schedule    []  Patient Will Schedule with External Provider / Order Routed & Care Team Updated if Applicable       Additional Notes:

## 2024-01-12 NOTE — LETTER
AUTHORIZATION FOR RELEASE OF   CONFIDENTIAL INFORMATION    Dear ,    We are seeing Angela Landaverde, date of birth 1960, in the clinic at [facility]. Joana Parmar DO is the patient's PCP. Angela Landaverde has an outstanding lab/procedure at the time we reviewed her chart. In order to help keep her health information updated, she has authorized us to request the following medical record(s):        (  )  MAMMOGRAM                                      (  )  COLONOSCOPY      ( x )  PAP SMEAR                                          ( x )  OUTSIDE LAB RESULTS     (  )  DEXA SCAN                                          (  )  EYE EXAM            (  )  FOOT EXAM                                          (  )  ENTIRE RECORD     (  )  OUTSIDE IMMUNIZATIONS                 (  )  _______________         Please fax records to [facility], Mnzava, Christy, DO, [phone]     If you have any questions, please contact Leah Bergman at [phone].           Patient Name: Angela Landaverde  : 1960  Patient Phone #: [phone]

## 2024-01-16 ENCOUNTER — TELEPHONE (OUTPATIENT)
Dept: ORTHOPEDICS | Facility: CLINIC | Age: 64
End: 2024-01-16
Payer: COMMERCIAL

## 2024-01-16 NOTE — TELEPHONE ENCOUNTER
----- Message from Smiley Vallecillo sent at 1/16/2024  2:39 PM CST -----  Regarding: Surgery  Patient is ready to set up surgery for right knee for the first week in March. Call back [phone]. Please Leave message if no answer      Patient's surgery was rescheduled to 3-11-24. No further questions.

## 2024-01-23 ENCOUNTER — TELEPHONE (OUTPATIENT)
Dept: ORTHOPEDICS | Facility: CLINIC | Age: 64
End: 2024-01-23
Payer: COMMERCIAL

## 2024-01-23 NOTE — TELEPHONE ENCOUNTER
----- Message from Vanna Bassett sent at 1/23/2024 12:16 PM CST -----  Pt wants to make sure u recvd all info needed. Please let her know at [phone].

## 2024-01-29 DIAGNOSIS — Z01.818 PREPROCEDURAL EXAMINATION: Primary | ICD-10-CM

## 2024-01-29 DIAGNOSIS — M17.11 OSTEOARTHRITIS OF RIGHT KNEE: ICD-10-CM

## 2024-01-29 DIAGNOSIS — M17.11 OSTEOARTHRITIS OF RIGHT KNEE, UNSPECIFIED OSTEOARTHRITIS TYPE: Primary | ICD-10-CM

## 2024-01-29 RX ORDER — TRANEXAMIC ACID 10 MG/ML
1000 INJECTION, SOLUTION INTRAVENOUS
Status: CANCELLED | OUTPATIENT
Start: 2024-01-29

## 2024-01-29 RX ORDER — SODIUM CHLORIDE 9 MG/ML
INJECTION, SOLUTION INTRAVENOUS CONTINUOUS
Status: CANCELLED | OUTPATIENT
Start: 2024-01-29

## 2024-01-29 RX ORDER — MUPIROCIN 20 MG/G
OINTMENT TOPICAL
Status: CANCELLED | OUTPATIENT
Start: 2024-01-29

## 2024-02-12 ENCOUNTER — OFFICE VISIT (OUTPATIENT)
Dept: FAMILY MEDICINE | Facility: CLINIC | Age: 64
End: 2024-02-12
Payer: COMMERCIAL

## 2024-02-12 VITALS
HEART RATE: 65 BPM | BODY MASS INDEX: 44.57 KG/M2 | OXYGEN SATURATION: 99 % | SYSTOLIC BLOOD PRESSURE: 129 MMHG | WEIGHT: 284 LBS | HEIGHT: 67 IN | DIASTOLIC BLOOD PRESSURE: 83 MMHG

## 2024-02-12 DIAGNOSIS — I10 PRIMARY HYPERTENSION: Primary | ICD-10-CM

## 2024-02-12 PROCEDURE — 1160F RVW MEDS BY RX/DR IN RCRD: CPT | Mod: CPTII,,, | Performed by: FAMILY MEDICINE

## 2024-02-12 PROCEDURE — 1159F MED LIST DOCD IN RCRD: CPT | Mod: CPTII,,, | Performed by: FAMILY MEDICINE

## 2024-02-12 PROCEDURE — 3074F SYST BP LT 130 MM HG: CPT | Mod: CPTII,,, | Performed by: FAMILY MEDICINE

## 2024-02-12 PROCEDURE — 3008F BODY MASS INDEX DOCD: CPT | Mod: CPTII,,, | Performed by: FAMILY MEDICINE

## 2024-02-12 PROCEDURE — 99213 OFFICE O/P EST LOW 20 MIN: CPT | Mod: ,,, | Performed by: FAMILY MEDICINE

## 2024-02-12 PROCEDURE — 3079F DIAST BP 80-89 MM HG: CPT | Mod: CPTII,,, | Performed by: FAMILY MEDICINE

## 2024-02-12 NOTE — PROGRESS NOTES
[facility]    Chief Complaint      Chief Complaint   Patient presents with    Follow-up     1 month        History of Present Illness      Angela Landaverde is a 63 y.o. female with chronic conditions of  has a past medical history of Arthritis, Hypertension, and Thyroid disease.     HPI    The patient presents today for a one month follow up visit for weight management.She has no complaints        Past Medical History:  Past Medical History:   Diagnosis Date    Arthritis     Hypertension     Thyroid disease        Past Surgical History:   has a past surgical history that includes  section and Tubal ligation.    Social History:  Social History     Tobacco Use    Smoking status: Never    Smokeless tobacco: Never   Substance Use Topics    Alcohol use: Not Currently     Comment: occasionally    Drug use: Never       I personally reviewed all past medical, surgical, and social.     Review of Systems   Constitutional:  Negative for chills and fever.   HENT:  Negative for ear pain and sore throat.    Eyes:  Negative for blurred vision.   Respiratory:  Negative for cough and shortness of breath.    Cardiovascular:  Negative for chest pain and palpitations.   Gastrointestinal:  Negative for abdominal pain and constipation.   Genitourinary:  Negative for dysuria and hematuria.   Musculoskeletal:  Negative for back pain and falls.   Skin:  Negative for itching and rash.   Neurological:  Negative for weakness and headaches.   Endo/Heme/Allergies:  Negative for polydipsia. Does not bruise/bleed easily.   Psychiatric/Behavioral:  Negative for suicidal ideas. The patient does not have insomnia.         Medications:  Outpatient Encounter Medications as of 2024   Medication Sig Dispense Refill    allopurinoL (ZYLOPRIM) 100 MG tablet Take 1 tablet (100 mg total) by mouth once daily. 90 tablet 2    atenoloL-chlorthalidone (TENORETIC) 50-25 mg Tab Take 1 tablet by mouth once daily. 90 tablet 2    diclofenac sodium  "(VOLTAREN) 1 % Gel APPLY 2 GRAMS TO AFFECTED AREAS EVERY 6 HOURS AS NEEDED 50 g 3    levothyroxine (SYNTHROID) 88 MCG tablet Take 1 tablet (88 mcg total) by mouth before breakfast. 90 tablet 1    meloxicam (MOBIC) 7.5 MG tablet Take 1 tablet (7.5 mg total) by mouth once daily. 30 tablet 2    methocarbamoL (ROBAXIN) 750 MG Tab TAKE 1 TABLET BY MOUTH 4 TIMES DAILY AS NEEDED 30 tablet 4    nystatin-triamcinolone (MYCOLOG II) cream Apply 1 each topically.      gabapentin (NEURONTIN) 100 MG capsule Take 3 capsules (300 mg total) by mouth daily as needed (pain). 90 capsule 1     No facility-administered encounter medications on file as of 2/12/2024.       Allergies:  Review of patient's allergies indicates:  No Known Allergies    Health Maintenance:  Immunization History   Administered Date(s) Administered    COVID-19 MRNA, LN-S PF (MODERNA HALF 0.25 ML DOSE) 01/06/2022    COVID-19, MRNA, LN-S, PF (Pfizer) (Purple Cap) 05/07/2021, 05/28/2021    Influenza - Quadrivalent - PF *Preferred* (6 months and older) 12/13/2023    Tdap 09/05/2023      Health Maintenance   Topic Date Due    Shingles Vaccine (1 of 2) Never done    Colorectal Cancer Screening  08/05/2023    Mammogram  11/15/2024    Lipid Panel  09/05/2028    TETANUS VACCINE  09/05/2033    Hepatitis C Screening  Completed        Physical Exam      Vital Signs  Pulse: 65  SpO2: 99 %  BP: 129/83  BP Location: Left arm  Patient Position: Sitting  Height and Weight  Height: 5' 7" (170.2 cm)  Weight: 128.8 kg (284 lb)  BSA (Calculated - sq m): 2.47 sq meters  BMI (Calculated): 44.5  Weight in (lb) to have BMI = 25: 159.3]    Physical Exam  Vitals and nursing note reviewed.   Constitutional:       Appearance: Normal appearance.   HENT:      Head: Normocephalic and atraumatic.   Cardiovascular:      Rate and Rhythm: Normal rate and regular rhythm.      Heart sounds: Normal heart sounds.   Pulmonary:      Effort: Pulmonary effort is normal.      Breath sounds: Normal breath " sounds.   Skin:     Findings: No rash.   Neurological:      General: No focal deficit present.      Mental Status: She is alert and oriented to person, place, and time.      Gait: Gait normal.   Psychiatric:         Mood and Affect: Mood normal.         Behavior: Behavior normal.         Thought Content: Thought content normal.         Judgment: Judgment normal.          Laboratory:  CBC:  Recent Labs   Lab 01/30/23  1446   WBC 7.20   RBC 4.34   Hemoglobin 12.5   Hematocrit 38.9   Platelet Count 260   MCV 89.6   MCH 28.8   MCHC 32.1     CMP:  Recent Labs   Lab 08/30/22  0848 01/30/23  1446 09/05/23  1351   Glucose 92 97 92   Calcium 9.6 9.3 8.9   Albumin 4.0 3.8 3.6   Total Protein 7.3 7.0 6.8   Sodium 140 143 144   Potassium 4.5 4.2 4.3   CO2 31 33 H 33 H   Chloride 102 104 105   BUN 14 14 12   Alk Phos 62 75 58   ALT 24 23 19   AST 20 17 18   Bilirubin, Total 0.7 0.6 0.6     LIPIDS:  Recent Labs   Lab 02/16/22  0914 08/30/22  0848 11/07/22  1300 09/05/23  1351   TSH 3.210 4.440 H 1.860 1.490   HDL Cholesterol 75 H  --   --  74 H   Cholesterol 184  --   --  178   Triglycerides 47  --   --  52   LDL Calculated 100  --   --  94   Cholesterol/HDL Ratio (Risk Factor) 2.5  --   --  2.4   Non-  --   --  104     TSH:  Recent Labs   Lab 08/30/22  0848 11/07/22  1300 09/05/23  1351   TSH 4.440 H 1.860 1.490     A1C:  Recent Labs   Lab 09/05/23  1351   Hemoglobin A1C 5.1       Assessment/Plan     Angela Landaverde is a 63 y.o.female with:    1. Primary hypertension  -CONTROLLED.  CONTINUE CURRENT TREATMENT.  2. BMI 40.0-44.9, adult  -patient given a sample of Ozempic.  She was instructed to inject 0.5 mg SC weekly      Chronic conditions status updated as per HPI.  Other than changes above, cont current medications and maintain follow up with specialists.  Return to clinic in 1 month(s) for weight management.    Joana Parmar DO  [facility]

## 2024-02-19 ENCOUNTER — TELEPHONE (OUTPATIENT)
Dept: ORTHOPEDICS | Facility: CLINIC | Age: 64
End: 2024-02-19
Payer: COMMERCIAL

## 2024-02-19 DIAGNOSIS — M17.11 OSTEOARTHRITIS OF RIGHT KNEE, UNSPECIFIED OSTEOARTHRITIS TYPE: Primary | ICD-10-CM

## 2024-02-19 DIAGNOSIS — M17.11 OSTEOARTHRITIS OF RIGHT KNEE: ICD-10-CM

## 2024-02-19 RX ORDER — SODIUM CHLORIDE 9 MG/ML
INJECTION, SOLUTION INTRAVENOUS CONTINUOUS
Status: CANCELLED | OUTPATIENT
Start: 2024-02-19

## 2024-02-19 RX ORDER — TRANEXAMIC ACID 10 MG/ML
1000 INJECTION, SOLUTION INTRAVENOUS
Status: CANCELLED | OUTPATIENT
Start: 2024-02-19

## 2024-02-19 RX ORDER — MUPIROCIN 20 MG/G
OINTMENT TOPICAL
Status: CANCELLED | OUTPATIENT
Start: 2024-02-19

## 2024-02-19 NOTE — TELEPHONE ENCOUNTER
----- Message from Lili Smalls sent at 2/19/2024  4:50 PM CST -----  Pt states she is returning missed call to staff - call back  #   [phone]

## 2024-02-23 ENCOUNTER — TELEPHONE (OUTPATIENT)
Dept: ORTHOPEDICS | Facility: CLINIC | Age: 64
End: 2024-02-23
Payer: COMMERCIAL

## 2024-02-23 NOTE — TELEPHONE ENCOUNTER
Patient calling about her FMLA paperwork. I told her she can drop it off at the  and once it is completed she will be called. I also let her know this process usually takes 7-10 business days.     ----- Message from Vanna Bassett sent at 2/23/2024  8:34 AM CST -----  Arie haines wants you to call at [phone].

## 2024-02-26 ENCOUNTER — TELEPHONE (OUTPATIENT)
Dept: ORTHOPEDICS | Facility: CLINIC | Age: 64
End: 2024-02-26
Payer: COMMERCIAL

## 2024-02-26 NOTE — TELEPHONE ENCOUNTER
PATIENT RESCHEDULE WITH DR. ALLEN FOR 10:30 TOMORROW     ----- Message from Lili Smalls sent at 2/26/2024  5:04 PM CST -----  Calling to let harmony know she can come in after another appt @ 9 am tomorrow - call back # [phone]

## 2024-02-27 ENCOUNTER — OFFICE VISIT (OUTPATIENT)
Dept: ORTHOPEDICS | Facility: CLINIC | Age: 64
End: 2024-02-27
Payer: COMMERCIAL

## 2024-02-27 DIAGNOSIS — M17.11 PRIMARY OSTEOARTHRITIS OF RIGHT KNEE: Primary | ICD-10-CM

## 2024-02-27 PROCEDURE — 99214 OFFICE O/P EST MOD 30 MIN: CPT | Mod: S$PBB,,, | Performed by: ORTHOPAEDIC SURGERY

## 2024-02-27 PROCEDURE — 99212 OFFICE O/P EST SF 10 MIN: CPT | Mod: PBBFAC | Performed by: ORTHOPAEDIC SURGERY

## 2024-02-27 NOTE — PROGRESS NOTES
CC:  Knee pain    63 y.o. Female returns to clinic for a follow up visit regarding knee pain.       Patient is here for right knee  recheck before surgery on the .       Past Medical History:   Diagnosis Date    Arthritis     Hypertension     Thyroid disease      Past Surgical History:   Procedure Laterality Date     SECTION      TUBAL LIGATION           PHYSICAL EXAMINATION:  There were no vitals taken for this visit.  General    Nursing note and vitals reviewed.  Constitutional: She is oriented to person, place, and time. She appears well-developed and well-nourished.   HENT:   Head: Normocephalic and atraumatic.   Nose: Nose normal.   Eyes: Pupils are equal, round, and reactive to light.   Neck: Neck supple.   Cardiovascular:  Normal rate, regular rhythm and intact distal pulses.            Pulmonary/Chest: Effort normal. No respiratory distress. She exhibits no tenderness.   Abdominal: Soft. She exhibits no distension. There is no abdominal tenderness.   Neurological: She is alert and oriented to person, place, and time. She has normal reflexes.   Psychiatric: She has a normal mood and affect. Her behavior is normal. Judgment and thought content normal.     General Musculoskeletal Exam   Gait: antalgic       Right Knee Exam     Inspection   Swelling: present  Effusion: present  Deformity: present    Tenderness   The patient is tender to palpation of the medial joint line.    Crepitus   The patient has crepitus of the patella and medial joint line.    Range of Motion   Extension:  abnormal   Flexion:  abnormal     Tests   Meniscus   Dilan:  Medial - positive   Ligament Examination   Lachman: normal (-1 to 2mm)   PCL-Posterior Drawer: normal (0 to 2mm)     MCL - Valgus: normal (0 to 2mm)  LCL - Varus: normal  Pivot Shift: normal (Equal)  Reverse Pivot Shift: normal (Equal)  Dial Test at 30 degrees: normal (< 5 degrees)  Dial Test at 90 degrees: normal (< 5 degrees)  Posterior Sag Test:  negative  Posterolateral Corner: unstable (>15 degrees difference)  Patella   Patellar Tracking: normal  Q-Angle at 90 degrees: normal  Patellar Grind: positive    Other   Sensation: normal    Muscle Strength   Right Lower Extremity   Quadriceps:  5/5   Hamstrin/5     Reflexes     Right Side   Quadriceps:  2+    Vascular Exam     Right Pulses  Dorsalis Pedis:      2+  Posterior Tibial:      2+            IMAGING:  Prior knee radiographs were reviewed demonstrating severe tricompartmental degenerative changes of the right knee with slight valgus alignment  ASSESSMENT:    No diagnosis found.    PLAN:     -Findings and treatment options were discussed with the patient  -All questions answered  Natural history and expected course discussed. Questions answered.  Educational materials distributed.    The conservative options including NSAIDs, activity modification, physical therapy, corticosteroid injection, and viscosupplimentation were discussed. She is interested in surgical intervention at this time.    The surgical process of knee replacement was discussed in detail with the patient including a detailed discussion of the procedure itself (including visual model, x-ray review, and literature review). The typical perioperative and post-operative course was discussed and perioperative risks were discussed to the patient's satisfaction.  Risks and complications discussed included but were not limited to the risks of anesthetic complications, infection, bleeding, wound healing complications, aseptic loosening, instability, limb length inequality, neurologic dysfunction including numbness,  DVT, pulmonary embolism, perioperative medical risks (cardiac, pulmonary, renal, neurologic), and death and the patient elects to proceed. We will initiate pre-operative medical evaluation and clearance and set a provisional date for surgical intervention according to the patient's schedule.   I have discussed anticoagulation with  aspirin and coumadin and in low risk patients I have recommended aspirin twice a day.  25 minutes was spent in direct consultation with the patient counselling her on the items listed above.        Has done an excellent job with the last year losing greater than 30 lb.  This point seems reasonable to proceed with knee arthroplasty of the right knee.  He has not a smoker has no other risk factors.  Risks benefits alternatives were discussed.  There are no Patient Instructions on file for this visit.      No orders of the defined types were placed in this encounter.        Procedures

## 2024-02-29 ENCOUNTER — CLINICAL SUPPORT (OUTPATIENT)
Dept: CARDIOLOGY | Facility: CLINIC | Age: 64
End: 2024-02-29
Payer: COMMERCIAL

## 2024-02-29 ENCOUNTER — OFFICE VISIT (OUTPATIENT)
Dept: FAMILY MEDICINE | Facility: CLINIC | Age: 64
End: 2024-02-29
Payer: COMMERCIAL

## 2024-02-29 VITALS
HEIGHT: 67 IN | WEIGHT: 279 LBS | DIASTOLIC BLOOD PRESSURE: 63 MMHG | BODY MASS INDEX: 43.79 KG/M2 | HEART RATE: 64 BPM | OXYGEN SATURATION: 99 % | SYSTOLIC BLOOD PRESSURE: 118 MMHG

## 2024-02-29 DIAGNOSIS — E03.9 HYPOTHYROIDISM, UNSPECIFIED TYPE: ICD-10-CM

## 2024-02-29 DIAGNOSIS — Z01.818 PREPROCEDURAL EXAMINATION: ICD-10-CM

## 2024-02-29 DIAGNOSIS — Z01.818 PREPROCEDURAL EXAMINATION: Primary | ICD-10-CM

## 2024-02-29 LAB
OHS QRS DURATION: 78 MS
OHS QTC CALCULATION: 428 MS

## 2024-02-29 PROCEDURE — 3008F BODY MASS INDEX DOCD: CPT | Mod: CPTII,,, | Performed by: FAMILY MEDICINE

## 2024-02-29 PROCEDURE — 1160F RVW MEDS BY RX/DR IN RCRD: CPT | Mod: CPTII,,, | Performed by: FAMILY MEDICINE

## 2024-02-29 PROCEDURE — 3074F SYST BP LT 130 MM HG: CPT | Mod: CPTII,,, | Performed by: FAMILY MEDICINE

## 2024-02-29 PROCEDURE — 85610 PROTHROMBIN TIME: CPT | Performed by: ORTHOPAEDIC SURGERY

## 2024-02-29 PROCEDURE — 85730 THROMBOPLASTIN TIME PARTIAL: CPT | Performed by: ORTHOPAEDIC SURGERY

## 2024-02-29 PROCEDURE — 1159F MED LIST DOCD IN RCRD: CPT | Mod: CPTII,,, | Performed by: FAMILY MEDICINE

## 2024-02-29 PROCEDURE — 3078F DIAST BP <80 MM HG: CPT | Mod: CPTII,,, | Performed by: FAMILY MEDICINE

## 2024-02-29 PROCEDURE — 99212 OFFICE O/P EST SF 10 MIN: CPT | Mod: PBBFAC,25

## 2024-02-29 PROCEDURE — 99214 OFFICE O/P EST MOD 30 MIN: CPT | Mod: ,,, | Performed by: FAMILY MEDICINE

## 2024-02-29 PROCEDURE — 93005 ELECTROCARDIOGRAM TRACING: CPT | Mod: PBBFAC | Performed by: INTERNAL MEDICINE

## 2024-02-29 PROCEDURE — 93010 ELECTROCARDIOGRAM REPORT: CPT | Mod: S$PBB,,, | Performed by: INTERNAL MEDICINE

## 2024-02-29 RX ORDER — LEVOTHYROXINE SODIUM 88 UG/1
88 TABLET ORAL
Qty: 30 TABLET | Refills: 0 | Status: SHIPPED | OUTPATIENT
Start: 2024-02-29 | End: 2024-04-03 | Stop reason: SDUPTHER

## 2024-02-29 NOTE — PROGRESS NOTES
[facility]    Chief Complaint      Chief Complaint   Patient presents with    surgery clearance       History of Present Illness      Angela Landaverde is a 63 y.o. female with chronic conditions of  has a past medical history of Arthritis, Hypertension, and Thyroid disease.     HPI    The patient presents today for surgical clearance.    The patient is scheduled to have a right knee replacement on 2024.        Past Medical History:  Past Medical History:   Diagnosis Date    Arthritis     Hypertension     Thyroid disease        Past Surgical History:   has a past surgical history that includes  section and Tubal ligation.    Social History:  Social History     Tobacco Use    Smoking status: Never    Smokeless tobacco: Never   Substance Use Topics    Alcohol use: Not Currently     Comment: occasionally    Drug use: Never       I personally reviewed all past medical, surgical, and social.     Review of Systems   Constitutional:  Negative for chills and fever.   HENT:  Negative for ear pain and sore throat.    Eyes:  Negative for blurred vision.   Respiratory:  Negative for cough and shortness of breath.    Cardiovascular:  Negative for chest pain and palpitations.   Gastrointestinal:  Negative for abdominal pain and constipation.   Genitourinary:  Negative for dysuria and hematuria.   Musculoskeletal:  Positive for joint pain. Negative for back pain and falls.        Complaining of right knee pain   Skin:  Negative for itching and rash.   Neurological:  Negative for weakness and headaches.   Endo/Heme/Allergies:  Negative for polydipsia. Does not bruise/bleed easily.   Psychiatric/Behavioral:  Negative for suicidal ideas. The patient does not have insomnia.         Medications:  Outpatient Encounter Medications as of 2024   Medication Sig Dispense Refill    allopurinoL (ZYLOPRIM) 100 MG tablet Take 1 tablet (100 mg total) by mouth once daily. 90 tablet 2    atenoloL-chlorthalidone  "(TENORETIC) 50-25 mg Tab Take 1 tablet by mouth once daily. 90 tablet 2    diclofenac sodium (VOLTAREN) 1 % Gel APPLY 2 GRAMS TO AFFECTED AREAS EVERY 6 HOURS AS NEEDED 50 g 3    levothyroxine (SYNTHROID) 88 MCG tablet Take 1 tablet (88 mcg total) by mouth before breakfast. 90 tablet 1    meloxicam (MOBIC) 7.5 MG tablet Take 1 tablet (7.5 mg total) by mouth once daily. 30 tablet 2    methocarbamoL (ROBAXIN) 750 MG Tab TAKE 1 TABLET BY MOUTH 4 TIMES DAILY AS NEEDED 30 tablet 4    nystatin-triamcinolone (MYCOLOG II) cream Apply 1 each topically.      gabapentin (NEURONTIN) 100 MG capsule Take 3 capsules (300 mg total) by mouth daily as needed (pain). 90 capsule 1     No facility-administered encounter medications on file as of 2/29/2024.       Allergies:  Review of patient's allergies indicates:  No Known Allergies    Health Maintenance:  Immunization History   Administered Date(s) Administered    COVID-19 MRNA, LN-S PF (MODERNA HALF 0.25 ML DOSE) 01/06/2022    COVID-19, MRNA, LN-S, PF (Pfizer) (Purple Cap) 05/07/2021, 05/28/2021    Influenza - Quadrivalent - PF *Preferred* (6 months and older) 12/13/2023    Tdap 09/05/2023      Health Maintenance   Topic Date Due    Shingles Vaccine (1 of 2) Never done    Colorectal Cancer Screening  08/05/2023    Mammogram  11/15/2024    Lipid Panel  09/05/2028    TETANUS VACCINE  09/05/2033    Hepatitis C Screening  Completed        Physical Exam      Vital Signs  Pulse: 64  SpO2: 99 %  BP: 118/63  BP Location: Right arm  Patient Position: Sitting  Height and Weight  Height: 5' 7" (170.2 cm)  Weight: 126.6 kg (279 lb)  BSA (Calculated - sq m): 2.45 sq meters  BMI (Calculated): 43.7  Weight in (lb) to have BMI = 25: 159.3]    Physical Exam  Vitals and nursing note reviewed.   Constitutional:       Appearance: Normal appearance.   HENT:      Head: Normocephalic and atraumatic.   Cardiovascular:      Rate and Rhythm: Normal rate and regular rhythm.      Heart sounds: Normal heart " sounds.   Pulmonary:      Effort: Pulmonary effort is normal.      Breath sounds: Normal breath sounds.   Skin:     Findings: No rash.   Neurological:      General: No focal deficit present.      Mental Status: She is alert and oriented to person, place, and time.      Gait: Gait abnormal.      Comments: Uses a cane   Psychiatric:         Mood and Affect: Mood normal.         Behavior: Behavior normal.         Thought Content: Thought content normal.         Judgment: Judgment normal.          Laboratory:  CBC:  Recent Labs   Lab 01/30/23  1446 02/29/24  0904   WBC 7.20 3.78 L   RBC 4.34 4.49   Hemoglobin 12.5 12.9   Hematocrit 38.9 38.2   Platelet Count 260 228   MCV 89.6 85.1   MCH 28.8 28.7   MCHC 32.1 33.8     CMP:  Recent Labs   Lab 01/30/23  1446 09/05/23  1351 02/29/24  0904   Glucose 97 92 100   Calcium 9.3 8.9 9.4   Albumin 3.8 3.6 3.6   Total Protein 7.0 6.8 7.3   Sodium 143 144 141   Potassium 4.2 4.3 3.6   CO2 33 H 33 H 34 H   Chloride 104 105 103   BUN 14 12 17   Alk Phos 75 58 56   ALT 23 19 19   AST 17 18 15   Bilirubin, Total 0.6 0.6 0.8     LIPIDS:  Recent Labs   Lab 02/16/22  0914 08/30/22  0848 11/07/22  1300 09/05/23  1351   TSH 3.210 4.440 H 1.860 1.490   HDL Cholesterol 75 H  --   --  74 H   Cholesterol 184  --   --  178   Triglycerides 47  --   --  52   LDL Calculated 100  --   --  94   Cholesterol/HDL Ratio (Risk Factor) 2.5  --   --  2.4   Non-  --   --  104     TSH:  Recent Labs   Lab 08/30/22  0848 11/07/22  1300 09/05/23  1351   TSH 4.440 H 1.860 1.490     A1C:  Recent Labs   Lab 09/05/23  1351   Hemoglobin A1C 5.1       Assessment/Plan     Angela Landaverde is a 63 y.o.female with:    1. Preprocedural examination    2. Hypothyroidism, unspecified type  -     levothyroxine (SYNTHROID) 88 MCG tablet; Take 1 tablet (88 mcg total) by mouth before breakfast.  Dispense: 30 tablet; Refill: 0    I reviewed the patient's lab work, urinalysis, chest x-ray, and EKG.  The patient is considered low  risk for surgery.    Chronic conditions status updated as per HPI.  Other than changes above, cont current medications and maintain follow up with specialists.  Return to clinic in 6 week(s) for medication refills.    Joana Parmar DO  [facility]

## 2024-03-05 ENCOUNTER — TELEPHONE (OUTPATIENT)
Dept: FAMILY MEDICINE | Facility: CLINIC | Age: 64
End: 2024-03-05
Payer: COMMERCIAL

## 2024-03-05 DIAGNOSIS — Z79.899 OTHER LONG TERM (CURRENT) DRUG THERAPY: ICD-10-CM

## 2024-03-05 DIAGNOSIS — Z01.818 PREPROCEDURAL EXAMINATION: Primary | ICD-10-CM

## 2024-03-05 NOTE — TELEPHONE ENCOUNTER
Notified pt that we don't see a UA in her orders, so we would like for her to come back and leave her urine for part of her sx clearance. Pt stated she left her urine at the hospital. Notified pt that we do not see anything in her chart for recent urine. Pt stated with frustration that she would come back tomorrow night to leave one.

## 2024-03-08 ENCOUNTER — TELEPHONE (OUTPATIENT)
Dept: ORTHOPEDICS | Facility: CLINIC | Age: 64
End: 2024-03-08
Payer: COMMERCIAL

## 2024-03-11 ENCOUNTER — ANESTHESIA (OUTPATIENT)
Dept: SURGERY | Facility: HOSPITAL | Age: 64
End: 2024-03-11
Payer: COMMERCIAL

## 2024-03-11 ENCOUNTER — HOSPITAL ENCOUNTER (OUTPATIENT)
Facility: HOSPITAL | Age: 64
Discharge: HOME | End: 2024-03-12
Attending: ORTHOPAEDIC SURGERY | Admitting: ORTHOPAEDIC SURGERY
Payer: COMMERCIAL

## 2024-03-11 ENCOUNTER — ANESTHESIA EVENT (OUTPATIENT)
Dept: SURGERY | Facility: HOSPITAL | Age: 64
End: 2024-03-11
Payer: COMMERCIAL

## 2024-03-11 DIAGNOSIS — I10 PRIMARY HYPERTENSION: ICD-10-CM

## 2024-03-11 DIAGNOSIS — E66.01 SEVERE OBESITY (BMI >= 40): ICD-10-CM

## 2024-03-11 DIAGNOSIS — M17.11 OSTEOARTHRITIS OF RIGHT KNEE, UNSPECIFIED OSTEOARTHRITIS TYPE: ICD-10-CM

## 2024-03-11 DIAGNOSIS — R00.1 BRADYCARDIA: ICD-10-CM

## 2024-03-11 DIAGNOSIS — M17.11 OSTEOARTHRITIS OF RIGHT KNEE: Primary | ICD-10-CM

## 2024-03-11 PROBLEM — M17.12 OSTEOARTHRITIS OF LEFT KNEE: Status: ACTIVE | Noted: 2024-03-11

## 2024-03-11 PROCEDURE — D9220A PRA ANESTHESIA: Mod: ANES,,, | Performed by: ANESTHESIOLOGY

## 2024-03-11 PROCEDURE — 93010 ELECTROCARDIOGRAM REPORT: CPT | Mod: ,,, | Performed by: HOSPITALIST

## 2024-03-11 PROCEDURE — 27201423 OPTIME MED/SURG SUP & DEVICES STERILE SUPPLY: Performed by: ORTHOPAEDIC SURGERY

## 2024-03-11 PROCEDURE — 36000712 HC OR TIME LEV V 1ST 15 MIN: Performed by: ORTHOPAEDIC SURGERY

## 2024-03-11 PROCEDURE — 27201960 HC SPINAL TRAY: Performed by: ANESTHESIOLOGY

## 2024-03-11 PROCEDURE — C1776 JOINT DEVICE (IMPLANTABLE): HCPCS | Performed by: ORTHOPAEDIC SURGERY

## 2024-03-11 PROCEDURE — C1769 GUIDE WIRE: HCPCS | Performed by: ORTHOPAEDIC SURGERY

## 2024-03-11 PROCEDURE — 27000655: Performed by: ANESTHESIOLOGY

## 2024-03-11 PROCEDURE — 63600175 PHARM REV CODE 636 W HCPCS

## 2024-03-11 PROCEDURE — 37000008 HC ANESTHESIA 1ST 15 MINUTES: Performed by: ORTHOPAEDIC SURGERY

## 2024-03-11 PROCEDURE — 25000003 PHARM REV CODE 250: Performed by: ORTHOPAEDIC SURGERY

## 2024-03-11 PROCEDURE — 71000033 HC RECOVERY, INTIAL HOUR: Performed by: ORTHOPAEDIC SURGERY

## 2024-03-11 PROCEDURE — 27000716 HC OXISENSOR PROBE, ANY SIZE: Performed by: ANESTHESIOLOGY

## 2024-03-11 PROCEDURE — 63600175 PHARM REV CODE 636 W HCPCS: Mod: JZ,JG | Performed by: ANESTHESIOLOGY

## 2024-03-11 PROCEDURE — 93005 ELECTROCARDIOGRAM TRACING: CPT

## 2024-03-11 PROCEDURE — 27447 TOTAL KNEE ARTHROPLASTY: CPT | Mod: RT,,, | Performed by: ORTHOPAEDIC SURGERY

## 2024-03-11 PROCEDURE — 20985 CPTR-ASST DIR MS PX: CPT | Mod: ,,, | Performed by: ORTHOPAEDIC SURGERY

## 2024-03-11 PROCEDURE — 36000713 HC OR TIME LEV V EA ADD 15 MIN: Performed by: ORTHOPAEDIC SURGERY

## 2024-03-11 PROCEDURE — 25000003 PHARM REV CODE 250

## 2024-03-11 PROCEDURE — 97165 OT EVAL LOW COMPLEX 30 MIN: CPT

## 2024-03-11 PROCEDURE — 63600175 PHARM REV CODE 636 W HCPCS: Performed by: ORTHOPAEDIC SURGERY

## 2024-03-11 PROCEDURE — 27000177 HC AIRWAY, LARYNGEAL MASK: Performed by: ANESTHESIOLOGY

## 2024-03-11 PROCEDURE — 97161 PT EVAL LOW COMPLEX 20 MIN: CPT

## 2024-03-11 PROCEDURE — 99214 OFFICE O/P EST MOD 30 MIN: CPT | Mod: ,,, | Performed by: HOSPITALIST

## 2024-03-11 PROCEDURE — 27200750 HC INSULATED NEEDLE/ STIMUPLEX: Performed by: ANESTHESIOLOGY

## 2024-03-11 PROCEDURE — 64447 NJX AA&/STRD FEMORAL NRV IMG: CPT | Mod: 59,RT,, | Performed by: ANESTHESIOLOGY

## 2024-03-11 PROCEDURE — C1713 ANCHOR/SCREW BN/BN,TIS/BN: HCPCS | Performed by: ORTHOPAEDIC SURGERY

## 2024-03-11 PROCEDURE — 27000510 HC BLANKET BAIR HUGGER ANY SIZE: Performed by: ANESTHESIOLOGY

## 2024-03-11 PROCEDURE — 37000009 HC ANESTHESIA EA ADD 15 MINS: Performed by: ORTHOPAEDIC SURGERY

## 2024-03-11 PROCEDURE — D9220A PRA ANESTHESIA: Mod: CRNA,,,

## 2024-03-11 DEVICE — SPEEDSET FULL DOSE ANTIBIOTIC BONE CEMENT, 10 PACK CATALOG NUMBER IS 6192-1-010
Type: IMPLANTABLE DEVICE | Site: KNEE | Status: FUNCTIONAL
Brand: SIMPLEX

## 2024-03-11 DEVICE — ATTUNE KNEE SYSTEM FEMORAL CRUCIATE RETAINING SIZE 6 RIGHT CEMENTED
Type: IMPLANTABLE DEVICE | Site: KNEE | Status: FUNCTIONAL
Brand: ATTUNE

## 2024-03-11 DEVICE — ATTUNE PATELLA MEDIALIZED DOME 35MM CEMENTED AOX
Type: IMPLANTABLE DEVICE | Site: KNEE | Status: FUNCTIONAL
Brand: ATTUNE

## 2024-03-11 DEVICE — ATTUNE KNEE SYSTEM TIBIAL BASE ROTATING PLATFORM SIZE 5 CEMENTED
Type: IMPLANTABLE DEVICE | Site: KNEE | Status: FUNCTIONAL
Brand: ATTUNE

## 2024-03-11 RX ORDER — DOCUSATE SODIUM 100 MG/1
100 CAPSULE, LIQUID FILLED ORAL EVERY 12 HOURS
Status: DISCONTINUED | OUTPATIENT
Start: 2024-03-11 | End: 2024-03-12 | Stop reason: SDUPTHER

## 2024-03-11 RX ORDER — ONDANSETRON 4 MG/1
8 TABLET, ORALLY DISINTEGRATING ORAL EVERY 8 HOURS PRN
Status: DISCONTINUED | OUTPATIENT
Start: 2024-03-11 | End: 2024-03-12 | Stop reason: HOSPADM

## 2024-03-11 RX ORDER — ALLOPURINOL 100 MG/1
100 TABLET ORAL DAILY
Status: DISCONTINUED | OUTPATIENT
Start: 2024-03-11 | End: 2024-03-11

## 2024-03-11 RX ORDER — ACETAMINOPHEN 500 MG
1000 TABLET ORAL EVERY 8 HOURS
Status: COMPLETED | OUTPATIENT
Start: 2024-03-11 | End: 2024-03-12

## 2024-03-11 RX ORDER — CELECOXIB 100 MG/1
200 CAPSULE ORAL 2 TIMES DAILY
Status: DISCONTINUED | OUTPATIENT
Start: 2024-03-11 | End: 2024-03-12 | Stop reason: HOSPADM

## 2024-03-11 RX ORDER — POLYETHYLENE GLYCOL 3350 17 G/17G
17 POWDER, FOR SOLUTION ORAL DAILY
Status: DISCONTINUED | OUTPATIENT
Start: 2024-03-11 | End: 2024-03-12 | Stop reason: HOSPADM

## 2024-03-11 RX ORDER — ROPIVACAINE HYDROCHLORIDE 7.5 MG/ML
INJECTION, SOLUTION EPIDURAL; PERINEURAL
Status: COMPLETED | OUTPATIENT
Start: 2024-03-11 | End: 2024-03-11

## 2024-03-11 RX ORDER — MORPHINE SULFATE 10 MG/ML
4 INJECTION INTRAMUSCULAR; INTRAVENOUS; SUBCUTANEOUS EVERY 5 MIN PRN
Status: DISCONTINUED | OUTPATIENT
Start: 2024-03-11 | End: 2024-03-11 | Stop reason: HOSPADM

## 2024-03-11 RX ORDER — PROPOFOL 10 MG/ML
INJECTION, EMULSION INTRAVENOUS
Status: DISCONTINUED | OUTPATIENT
Start: 2024-03-11 | End: 2024-03-11

## 2024-03-11 RX ORDER — SODIUM CHLORIDE, SODIUM LACTATE, POTASSIUM CHLORIDE, CALCIUM CHLORIDE 600; 310; 30; 20 MG/100ML; MG/100ML; MG/100ML; MG/100ML
INJECTION, SOLUTION INTRAVENOUS CONTINUOUS
Status: DISCONTINUED | OUTPATIENT
Start: 2024-03-11 | End: 2024-03-11

## 2024-03-11 RX ORDER — ONDANSETRON HYDROCHLORIDE 2 MG/ML
4 INJECTION, SOLUTION INTRAVENOUS DAILY PRN
Status: DISCONTINUED | OUTPATIENT
Start: 2024-03-11 | End: 2024-03-11 | Stop reason: HOSPADM

## 2024-03-11 RX ORDER — KETOROLAC TROMETHAMINE 30 MG/ML
INJECTION, SOLUTION INTRAMUSCULAR; INTRAVENOUS
Status: DISCONTINUED | OUTPATIENT
Start: 2024-03-11 | End: 2024-03-11

## 2024-03-11 RX ORDER — HYDROMORPHONE HYDROCHLORIDE 2 MG/ML
0.5 INJECTION, SOLUTION INTRAMUSCULAR; INTRAVENOUS; SUBCUTANEOUS EVERY 5 MIN PRN
Status: DISCONTINUED | OUTPATIENT
Start: 2024-03-11 | End: 2024-03-11 | Stop reason: HOSPADM

## 2024-03-11 RX ORDER — OXYCODONE HYDROCHLORIDE 5 MG/1
5 TABLET ORAL
Status: DISCONTINUED | OUTPATIENT
Start: 2024-03-11 | End: 2024-03-11 | Stop reason: HOSPADM

## 2024-03-11 RX ORDER — SODIUM CHLORIDE 9 MG/ML
INJECTION, SOLUTION INTRAVENOUS CONTINUOUS
Status: DISCONTINUED | OUTPATIENT
Start: 2024-03-11 | End: 2024-03-11

## 2024-03-11 RX ORDER — METHOCARBAMOL 750 MG/1
750 TABLET, FILM COATED ORAL 4 TIMES DAILY PRN
Status: DISCONTINUED | OUTPATIENT
Start: 2024-03-11 | End: 2024-03-12 | Stop reason: HOSPADM

## 2024-03-11 RX ORDER — ACETAMINOPHEN 10 MG/ML
1000 INJECTION, SOLUTION INTRAVENOUS ONCE
Status: ACTIVE | OUTPATIENT
Start: 2024-03-11 | End: 2024-03-12

## 2024-03-11 RX ORDER — LIDOCAINE HYDROCHLORIDE 20 MG/ML
INJECTION, SOLUTION EPIDURAL; INFILTRATION; INTRACAUDAL; PERINEURAL
Status: DISCONTINUED | OUTPATIENT
Start: 2024-03-11 | End: 2024-03-11

## 2024-03-11 RX ORDER — SODIUM CHLORIDE, SODIUM LACTATE, POTASSIUM CHLORIDE, CALCIUM CHLORIDE 600; 310; 30; 20 MG/100ML; MG/100ML; MG/100ML; MG/100ML
125 INJECTION, SOLUTION INTRAVENOUS CONTINUOUS
Status: DISCONTINUED | OUTPATIENT
Start: 2024-03-11 | End: 2024-03-11

## 2024-03-11 RX ORDER — BISACODYL 10 MG/1
10 SUPPOSITORY RECTAL DAILY PRN
Status: DISCONTINUED | OUTPATIENT
Start: 2024-03-11 | End: 2024-03-12 | Stop reason: HOSPADM

## 2024-03-11 RX ORDER — CEFAZOLIN SODIUM 1 G/3ML
INJECTION, POWDER, FOR SOLUTION INTRAMUSCULAR; INTRAVENOUS
Status: DISCONTINUED | OUTPATIENT
Start: 2024-03-11 | End: 2024-03-11

## 2024-03-11 RX ORDER — LOPERAMIDE HYDROCHLORIDE 2 MG/1
4 CAPSULE ORAL ONCE
Status: DISCONTINUED | OUTPATIENT
Start: 2024-03-11 | End: 2024-03-12 | Stop reason: HOSPADM

## 2024-03-11 RX ORDER — ROPIVACAINE/EPI/CLONIDINE/KET 2.46-0.005
SYRINGE (ML) INJECTION
Status: DISCONTINUED | OUTPATIENT
Start: 2024-03-11 | End: 2024-03-11 | Stop reason: HOSPADM

## 2024-03-11 RX ORDER — BUPIVACAINE HYDROCHLORIDE 7.5 MG/ML
INJECTION, SOLUTION EPIDURAL; RETROBULBAR
Status: COMPLETED | OUTPATIENT
Start: 2024-03-11 | End: 2024-03-11

## 2024-03-11 RX ORDER — DEXAMETHASONE SODIUM PHOSPHATE 4 MG/ML
INJECTION, SOLUTION INTRA-ARTICULAR; INTRALESIONAL; INTRAMUSCULAR; INTRAVENOUS; SOFT TISSUE
Status: DISCONTINUED | OUTPATIENT
Start: 2024-03-11 | End: 2024-03-11

## 2024-03-11 RX ORDER — LEVOTHYROXINE SODIUM 88 UG/1
88 TABLET ORAL
Status: DISCONTINUED | OUTPATIENT
Start: 2024-03-12 | End: 2024-03-12 | Stop reason: HOSPADM

## 2024-03-11 RX ORDER — TRANEXAMIC ACID 10 MG/ML
1000 INJECTION, SOLUTION INTRAVENOUS
Status: DISCONTINUED | OUTPATIENT
Start: 2024-03-11 | End: 2024-03-11 | Stop reason: HOSPADM

## 2024-03-11 RX ORDER — MIDAZOLAM HYDROCHLORIDE 1 MG/ML
INJECTION INTRAMUSCULAR; INTRAVENOUS
Status: DISCONTINUED | OUTPATIENT
Start: 2024-03-11 | End: 2024-03-11

## 2024-03-11 RX ORDER — BISOPROLOL FUMARATE AND HYDROCHLOROTHIAZIDE 5; 6.25 MG/1; MG/1
1 TABLET ORAL DAILY
Status: DISCONTINUED | OUTPATIENT
Start: 2024-03-11 | End: 2024-03-12 | Stop reason: HOSPADM

## 2024-03-11 RX ORDER — DIPHENHYDRAMINE HYDROCHLORIDE 50 MG/ML
INJECTION INTRAMUSCULAR; INTRAVENOUS
Status: DISCONTINUED | OUTPATIENT
Start: 2024-03-11 | End: 2024-03-11

## 2024-03-11 RX ORDER — ASPIRIN 325 MG
325 TABLET ORAL DAILY
Status: DISCONTINUED | OUTPATIENT
Start: 2024-03-12 | End: 2024-03-12 | Stop reason: HOSPADM

## 2024-03-11 RX ORDER — LIDOCAINE HYDROCHLORIDE 10 MG/ML
1 INJECTION INFILTRATION; PERINEURAL ONCE
Status: DISCONTINUED | OUTPATIENT
Start: 2024-03-11 | End: 2024-03-12 | Stop reason: HOSPADM

## 2024-03-11 RX ORDER — ONDANSETRON HYDROCHLORIDE 2 MG/ML
INJECTION, SOLUTION INTRAVENOUS
Status: DISCONTINUED | OUTPATIENT
Start: 2024-03-11 | End: 2024-03-11

## 2024-03-11 RX ORDER — FENTANYL CITRATE 50 UG/ML
INJECTION, SOLUTION INTRAMUSCULAR; INTRAVENOUS
Status: DISCONTINUED | OUTPATIENT
Start: 2024-03-11 | End: 2024-03-11

## 2024-03-11 RX ORDER — MELOXICAM 7.5 MG/1
7.5 TABLET ORAL DAILY
Status: DISCONTINUED | OUTPATIENT
Start: 2024-03-11 | End: 2024-03-11

## 2024-03-11 RX ORDER — MUPIROCIN 20 MG/G
1 OINTMENT TOPICAL 2 TIMES DAILY
Status: DISCONTINUED | OUTPATIENT
Start: 2024-03-11 | End: 2024-03-12 | Stop reason: HOSPADM

## 2024-03-11 RX ORDER — MUPIROCIN 20 MG/G
OINTMENT TOPICAL
Status: DISCONTINUED | OUTPATIENT
Start: 2024-03-11 | End: 2024-03-11 | Stop reason: HOSPADM

## 2024-03-11 RX ORDER — GLYCOPYRROLATE 0.2 MG/ML
INJECTION INTRAMUSCULAR; INTRAVENOUS
Status: DISCONTINUED | OUTPATIENT
Start: 2024-03-11 | End: 2024-03-11

## 2024-03-11 RX ORDER — EPHEDRINE SULFATE 50 MG/ML
INJECTION, SOLUTION INTRAVENOUS
Status: DISCONTINUED | OUTPATIENT
Start: 2024-03-11 | End: 2024-03-11

## 2024-03-11 RX ORDER — DIPHENHYDRAMINE HYDROCHLORIDE 50 MG/ML
25 INJECTION INTRAMUSCULAR; INTRAVENOUS EVERY 6 HOURS PRN
Status: DISCONTINUED | OUTPATIENT
Start: 2024-03-11 | End: 2024-03-11 | Stop reason: HOSPADM

## 2024-03-11 RX ORDER — TRANEXAMIC ACID 100 MG/ML
INJECTION, SOLUTION INTRAVENOUS
Status: DISCONTINUED | OUTPATIENT
Start: 2024-03-11 | End: 2024-03-11

## 2024-03-11 RX ORDER — FAMOTIDINE 20 MG/1
20 TABLET, FILM COATED ORAL 2 TIMES DAILY
Status: DISCONTINUED | OUTPATIENT
Start: 2024-03-11 | End: 2024-03-12 | Stop reason: HOSPADM

## 2024-03-11 RX ORDER — OXYCODONE HYDROCHLORIDE 5 MG/1
5 TABLET ORAL EVERY 4 HOURS PRN
Status: DISCONTINUED | OUTPATIENT
Start: 2024-03-11 | End: 2024-03-12 | Stop reason: HOSPADM

## 2024-03-11 RX ORDER — PHENYLEPHRINE HYDROCHLORIDE 10 MG/ML
INJECTION INTRAVENOUS
Status: DISCONTINUED | OUTPATIENT
Start: 2024-03-11 | End: 2024-03-11

## 2024-03-11 RX ORDER — SODIUM CHLORIDE 0.9 % (FLUSH) 0.9 %
3 SYRINGE (ML) INJECTION EVERY 6 HOURS PRN
Status: DISCONTINUED | OUTPATIENT
Start: 2024-03-11 | End: 2024-03-12 | Stop reason: HOSPADM

## 2024-03-11 RX ORDER — MEPERIDINE HYDROCHLORIDE 25 MG/ML
25 INJECTION INTRAMUSCULAR; INTRAVENOUS; SUBCUTANEOUS EVERY 10 MIN PRN
Status: DISCONTINUED | OUTPATIENT
Start: 2024-03-11 | End: 2024-03-11 | Stop reason: HOSPADM

## 2024-03-11 RX ORDER — OXYCODONE HYDROCHLORIDE 5 MG/1
10 TABLET ORAL EVERY 4 HOURS PRN
Status: DISCONTINUED | OUTPATIENT
Start: 2024-03-11 | End: 2024-03-12 | Stop reason: HOSPADM

## 2024-03-11 RX ORDER — GABAPENTIN 300 MG/1
300 CAPSULE ORAL DAILY PRN
Status: DISCONTINUED | OUTPATIENT
Start: 2024-03-11 | End: 2024-03-12 | Stop reason: HOSPADM

## 2024-03-11 RX ORDER — ZOLPIDEM TARTRATE 5 MG/1
5 TABLET ORAL NIGHTLY PRN
Status: DISCONTINUED | OUTPATIENT
Start: 2024-03-11 | End: 2024-03-12 | Stop reason: HOSPADM

## 2024-03-11 RX ORDER — ALLOPURINOL 300 MG/1
300 TABLET ORAL DAILY
Status: DISCONTINUED | OUTPATIENT
Start: 2024-03-12 | End: 2024-03-12 | Stop reason: HOSPADM

## 2024-03-11 RX ORDER — PREGABALIN 75 MG/1
75 CAPSULE ORAL 2 TIMES DAILY
Status: DISCONTINUED | OUTPATIENT
Start: 2024-03-11 | End: 2024-03-12 | Stop reason: HOSPADM

## 2024-03-11 RX ADMIN — OXYCODONE HYDROCHLORIDE 5 MG: 5 TABLET ORAL at 06:03

## 2024-03-11 RX ADMIN — VANCOMYCIN HYDROCHLORIDE 1500 MG: 1 INJECTION, POWDER, LYOPHILIZED, FOR SOLUTION INTRAVENOUS at 09:03

## 2024-03-11 RX ADMIN — PHENYLEPHRINE HYDROCHLORIDE 100 MCG: 10 INJECTION INTRAVENOUS at 10:03

## 2024-03-11 RX ADMIN — BUPIVACAINE HYDROCHLORIDE 1.5 ML: 7.5 INJECTION, SOLUTION EPIDURAL; RETROBULBAR at 09:03

## 2024-03-11 RX ADMIN — DEXAMETHASONE SODIUM PHOSPHATE 8 MG: 4 INJECTION, SOLUTION INTRA-ARTICULAR; INTRALESIONAL; INTRAMUSCULAR; INTRAVENOUS; SOFT TISSUE at 09:03

## 2024-03-11 RX ADMIN — DIPHENHYDRAMINE HYDROCHLORIDE 12.5 MG: 50 INJECTION, SOLUTION INTRAMUSCULAR; INTRAVENOUS at 09:03

## 2024-03-11 RX ADMIN — CEFAZOLIN 3 G: 1 INJECTION, POWDER, FOR SOLUTION INTRAMUSCULAR; INTRAVENOUS; PARENTERAL at 09:03

## 2024-03-11 RX ADMIN — ALLOPURINOL 100 MG: 100 TABLET ORAL at 01:03

## 2024-03-11 RX ADMIN — GLYCOPYRROLATE 0.2 MG: 0.2 INJECTION INTRAMUSCULAR; INTRAVENOUS at 09:03

## 2024-03-11 RX ADMIN — PREGABALIN 75 MG: 75 CAPSULE ORAL at 09:03

## 2024-03-11 RX ADMIN — EPHEDRINE SULFATE 15 MG: 50 INJECTION INTRAVENOUS at 09:03

## 2024-03-11 RX ADMIN — LIDOCAINE HYDROCHLORIDE 100 MG: 20 INJECTION, SOLUTION INTRAVENOUS at 09:03

## 2024-03-11 RX ADMIN — ROPIVACAINE HYDROCHLORIDE 30 ML: 7.5 INJECTION, SOLUTION EPIDURAL; PERINEURAL at 09:03

## 2024-03-11 RX ADMIN — PROPOFOL 120 MG: 10 INJECTION, EMULSION INTRAVENOUS at 09:03

## 2024-03-11 RX ADMIN — EPHEDRINE SULFATE 5 MG: 50 INJECTION INTRAVENOUS at 09:03

## 2024-03-11 RX ADMIN — ACETAMINOPHEN 1000 MG: 500 TABLET ORAL at 11:03

## 2024-03-11 RX ADMIN — OXYCODONE HYDROCHLORIDE 5 MG: 5 TABLET ORAL at 09:03

## 2024-03-11 RX ADMIN — TRANEXAMIC ACID 500 MG: 100 INJECTION, SOLUTION INTRAVENOUS at 09:03

## 2024-03-11 RX ADMIN — PHENYLEPHRINE HYDROCHLORIDE 100 MCG: 10 INJECTION INTRAVENOUS at 11:03

## 2024-03-11 RX ADMIN — CEFAZOLIN 2 G: 2 INJECTION, POWDER, FOR SOLUTION INTRAMUSCULAR; INTRAVENOUS at 06:03

## 2024-03-11 RX ADMIN — TRANEXAMIC ACID 500 MG: 100 INJECTION, SOLUTION INTRAVENOUS at 11:03

## 2024-03-11 RX ADMIN — FENTANYL CITRATE 100 MCG: 50 INJECTION INTRAMUSCULAR; INTRAVENOUS at 08:03

## 2024-03-11 RX ADMIN — MIDAZOLAM 2 MG: 1 INJECTION INTRAMUSCULAR; INTRAVENOUS at 08:03

## 2024-03-11 RX ADMIN — SODIUM CHLORIDE: 9 INJECTION, SOLUTION INTRAVENOUS at 08:03

## 2024-03-11 RX ADMIN — DOCUSATE SODIUM 100 MG: 100 CAPSULE, LIQUID FILLED ORAL at 09:03

## 2024-03-11 RX ADMIN — KETOROLAC TROMETHAMINE 60 MG: 30 INJECTION, SOLUTION INTRAMUSCULAR at 09:03

## 2024-03-11 RX ADMIN — ONDANSETRON 4 MG: 2 INJECTION INTRAMUSCULAR; INTRAVENOUS at 11:03

## 2024-03-11 RX ADMIN — ONDANSETRON 4 MG: 2 INJECTION INTRAMUSCULAR; INTRAVENOUS at 09:03

## 2024-03-11 NOTE — PLAN OF CARE
Problem: Occupational Therapy  Goal: Occupational Therapy Goal  Description: STG: (in 1 week)  1.Pt will perform bathing with setup and SBA  2.Pt will perform UE dressing with independence  3.Pt will perform LE dressing with min(A)  4.Pt will transfer bed/chair/bsc with mod(I) with RW  5.Pt will perform standing task x 3 min with SBA with RW  6.Tolerate 15 min of tx without fatigue.      LTG: (in 5 weeks)  Restore to max I with selfcare and mobility.     Outcome: Ongoing, Progressing

## 2024-03-11 NOTE — ANESTHESIA PROCEDURE NOTES
LMA    Date/Time: 3/11/2024 9:11 AM    Performed by: Zack Chow II, CRNA  Authorized by: Chad Frazier MD    Intubation:     Induction:  Intravenous    Intubated:  Postinduction    Mask Ventilation:  Easy mask    Attempts:  1    Attempted By:  CRNA    Difficult Airway Encountered?: No      Complications:  None    Airway Device:  Supraglottic airway/LMA    Airway Device Size:  4.0    Style/Cuff Inflation:  Cuffed (inflated to minimal occlusive pressure)    Secured at:  The lips    Placement Verified By:  Capnometry    Complicating Factors:  None    Findings Post-Intubation:  BS equal bilateral and atraumatic/condition of teeth unchanged

## 2024-03-11 NOTE — ANESTHESIA PROCEDURE NOTES
Peripheral Block    Patient location during procedure: OR   Block not for primary anesthetic.  Reason for block: at surgeon's request and post-op pain management   Post-op Pain Location: rt knee pain, p   Start time: 3/11/2024 9:09 AM  Timeout: 3/11/2024 9:08 AM   End time: 3/11/2024 9:15 AM    Staffing  Authorizing Provider: Chad Frazier MD  Performing Provider: Chad Frazier MD    Staffing  Performed by: Chad Frazier MD  Authorized by: Chad Frazier MD    Preanesthetic Checklist  Completed: patient identified, IV checked, site marked, risks and benefits discussed, surgical consent, monitors and equipment checked, pre-op evaluation and timeout performed  Peripheral Block  Patient position: supine  Prep: ChloraPrep  Patient monitoring: heart rate, cardiac monitor, continuous pulse ox, continuous capnometry and frequent blood pressure checks  Block type: adductor canal  Laterality: right  Injection technique: single shot  Needle  Needle type: Tuohy   Needle gauge: 20 G  Needle length: 4 in  Needle localization: ultrasound guidance   -ultrasound image captured on disc.  Assessment  Injection assessment: negative aspiration, negative parasthesia and local visualized surrounding nerve  Paresthesia pain: none  Heart rate change: no  Slow fractionated injection: yes  Pain Tolerance: comfortable throughout block and no complaints  Medications:    Medications: ROPIvacaine (NAROPIN) injection 0.75% - Perineural   30 mL - 3/11/2024 9:10:00 AM

## 2024-03-11 NOTE — ANESTHESIA PREPROCEDURE EVALUATION
03/11/2024  Angela Landaverde is a 63 y.o., female.      Pre-op Assessment    I have reviewed the Patient Summary Reports.     I have reviewed the Nursing Notes. I have reviewed the NPO Status.   I have reviewed the Medications.     Review of Systems  Anesthesia Hx:  No problems with previous Anesthesia                Social:  Non-Smoker, No Alcohol Use       Hematology/Oncology:  Hematology Normal   Oncology Normal                                   EENT/Dental:  EENT/Dental Normal           Cardiovascular:     Hypertension           hyperlipidemia                             Pulmonary:  Pulmonary Normal                       Renal/:  Renal/ Normal                 Hepatic/GI:  Hepatic/GI Normal                 Musculoskeletal:  Arthritis               Neurological:  Neurology Normal                                      Endocrine:   Hypothyroidism        Morbid Obesity / BMI > 40  Dermatological:  Skin Normal    Psych:  Psychiatric Normal                    Physical Exam  General: Well nourished    Airway:  Mallampati: III / III  Mouth Opening: Normal  TM Distance: > 6 cm  Tongue: Normal  Neck ROM: Normal ROM    Chest/Lungs:  Clear to auscultation, Normal Respiratory Rate    Heart:  Rate: Normal  Rhythm: Regular Rhythm        Anesthesia Plan  Type of Anesthesia, risks & benefits discussed:    Anesthesia Type: Gen Supraglottic Airway, Spinal, Regional  Intra-op Monitoring Plan: Standard ASA Monitors  Post Op Pain Control Plan: multimodal analgesia  Induction:  IV  Informed Consent: Informed consent signed with the Patient and all parties understand the risks and agree with anesthesia plan.  All questions answered. Patient consented to blood products? Yes  ASA Score: 2  Day of Surgery Review of History & Physical: H&P Update referred to the surgeon/provider.I have interviewed and examined the patient. I have  reviewed the patient's H&P dated: There are no significant changes.     Ready For Surgery From Anesthesia Perspective.     .

## 2024-03-11 NOTE — ASSESSMENT & PLAN NOTE
Surgery 03/11/24  Therapy team to see  Patient plans to rehab at [facility]  She lives alone at home

## 2024-03-11 NOTE — ASSESSMENT & PLAN NOTE
States pain not as bad as on the right side feels she will need surgery on left side at some point in the future

## 2024-03-11 NOTE — OP NOTE
Department of Orthopedic Surgery    Operative Note  NAME: Angela Landaverde    : 1960      DATE: 3/11/2024      PREOPERATIVE DIAGNOSIS: Osteoarthritis of right knee, unspecified osteoarthritis type [M17.11]    POSTOPERATIVE DIAGNOSIS:  Osteoarthritis of right knee, unspecified osteoarthritis type [M17.11]    PROCEDURE: right Total Knee Arthroplasty    SURGEON: Mark Pemberton    ASSISTANT: Jacob Renee    ANESTHESIA: Spinal + adductor canal     BLOOD LOSS: 15cc    TOURNIQUET TIME:  1 hr 15 mins    DRAINS: None    IMPLANTS:   Implant Name Type Inv. Item Serial No.  Lot No. LRB No. Used Action   VELYS ARRAY DRILL PIN    [employer] [id number] Right 1 Implanted and Explanted   VELYS ARRAY DRILL PIN    [employer] [id number] Right 1 Implanted and Explanted   VELYS ARRAY DRILL PIN    [employer] [id number] Right 1 Implanted and Explanted   VELYS ARRAY DRILL PIN    [employer] [id number] Right 1 Implanted and Explanted   CEMENT BONE SPEED SET - [id number]  CEMENT BONE SPEED SET  LEILA [employer] LONG. [id number] Right 1 Implanted   CEMENT BONE SPEED SET - [id number]  CEMENT BONE SPEED SET  LEILA [employer] LONG. [id number] Right 1 Implanted   BASE ATTUNE RP TIB SHRUTHI SIZE 5 - [id number]  BASE ATTUNE RP TIB SHRUTHI SIZE 5  SYNTHES [id number] Right 1 Implanted   COMP FEM ATTUNE SZ 6 RIGHT - [id number]  COMP FEM ATTUNE SZ 6 RIGHT  [employer]. [id number] Right 1 Implanted   PATELLA ATTUNE MEDLZD DOME 35 - [id number]  PATELLA ATTUNE MEDLZD DOME 35  [employer]PU[employer]. [id number] Right 1 Implanted   ATTUNE TIBIAL INSERT ROTATING PLATFORM CRUCIATE RETAINING    [employer]. [id number] Right 1 Implanted       Attune Cemented Tibial Baseplate, Cruciate Retaining, Size 5  Attune Cemented Femoral Component, Size 6  Attune Rotating Platform, Cruciate retaining 7 polyethylene spacer   Attune Size 35 Medialized dome patella button     INDICATIONS FOR PROCEDURE:   [unfilled] is a 63 y.o.-year-old with debilitating right-sided knee pain despite nonoperative measures and interested in  knee arthroplasty.    PROCEDURE IN DETAIL:  After obtaining informed consent and starting the patient on preoperative IV antibiotics, the patient was taken back to the Operating   Room. Anesthesia was performed by Anesthesia Team. The right lower  extremity was prepped and draped in normal sterile fashion.  An Esmarch bandage was used to exsanguinate the affected lower extremity and the pneumatic tourniquet  was inflated to 300 mmHg.  A standard longitudinal midline incision was made beginning 3 fingerbreadths above the superior pole of the patella extending down to the tibial tubercle.  Full-thickness skin flaps were raised.  A medial parapatellar arthrotomy was made. The patella was then everted.    A release of the proximal medial tibia and MCL was then undertaken.  The infrapatella fat pad was resected.    At this point guide pins were placed along the proximal tibia and distal femur.  A utilized [employer] robotic assistance in this case and took the knee and hip through an arc of motion to find the center of the hip.  Way points were then marked along the tibia and femur.  After the appropriate registration was performed robotic assistant was utilized to create distal femoral and proximal tibial cuts.  An extension block was used to verify appropriate alignment of the joint at this point.  I then utilized a tensioner in order to verify the appropriate tension within the flexion gap and selected the appropriate parameters for rotation of the femoral component.  The femoral cuts were then performed at this point making anterior posterior and chamfer cuts appropriately.  Trials were then positioned on the femur and a floating trial was placed in the tibia and the knee was taken through a range of motion and satisfactory stability and alignment was able to be achieved.      Trial components were placed on the femur and tibia.   Flexion and extension gaps were symmetric and balanced at this point.    The trial  components demonstrated proper range of motion and stability.    The patella was addressed next.  A guide was utilized to resect 9.5 mm of bone.  A drill guide was applied to flat patella surface and appropriate drill holes were made.  A trial patella button was placed.  The patella tracked nicely.  No lateral release was required.  We then Pulsavac irrigated cancellous bone and vacuum mixed the cement.      The final components were then implanted.  The tibial component was placed first, followed by the femoral component.  Excess cement was removed and a trial polyethylene was placed.  The cemented patella was then placed and excess cement was removed.   Once the cement hardened,the tourniquet was released.  Bleeders were coagulated.  1 gram of TXA was given at this point. The appropriate dose of pericapsular injection was then administered, focusing on the posterior capsule.  Final polyethylene component was placed.  Satisfactory range of motion and stability was demonstrated.    The extensor retinaculum was then clsoed with #1 Vicryl figure of eight sutures with the knee in slight flexion, the subcutaneous tissue with 2-0 Vicryl, skin reapproximated with staples.  A sterile dressing was applied, as was a knee immobilizer.  Patient was then taken to the recovery room in stable condition.

## 2024-03-11 NOTE — ANESTHESIA PROCEDURE NOTES
Spinal    Diagnosis: rt knee pain, p op  Patient location during procedure: OR  Start time: 3/11/2024 9:01 AM  Timeout: 3/11/2024 9:00 AM  End time: 3/11/2024 9:05 AM    Staffing  Authorizing Provider: Chad Frazier MD  Performing Provider: Chad Frazier MD    Staffing  Performed by: Chad Frazier MD  Authorized by: Chad Frazier MD    Preanesthetic Checklist  Completed: patient identified, IV checked, risks and benefits discussed, surgical consent, monitors and equipment checked, pre-op evaluation and timeout performed  Spinal Block  Patient position: sitting  Prep: Betadine  Patient monitoring: heart rate, continuous pulse ox, continuous capnometry and frequent blood pressure checks  Approach: midline  Location: L3-4  Injection technique: single shot  CSF Fluid: clear free-flowing CSF  Needle  Needle type: Quincke   Needle gauge: 22 G  Needle length: 4 in  Needle localization: anatomical landmarks  Assessment  Sensory level: T8   Dermatomal levels determined by alcohol wipe  Ease of block: easy  Patient's tolerance of the procedure: comfortable throughout block and no complaints  Medications:    Medications: BUPivacaine (pf) (MARCAINE) injection 0.75% - Intraspinal   1.5 mL - 3/11/2024 9:02:00 AM

## 2024-03-11 NOTE — DISCHARGE INSTRUCTIONS
ANKLE: Pumps        Point toes down, then up. Repeat 30 times, 2 sessions per day        Quad Set        With other leg bent, foot flat, slowly tighten muscles on right thigh of straight leg while counting out loud to 5.  Repeat 30 times, 2 sessions per day.          Hip Abduction / Adduction: with Extended Knee (Supine)        Bring right leg out to side and return. Keep knee straight.  Repeat 30 times, 2 sessions per day.         HIP / KNEE: Flexion, Heel Slides - Supine        Slide right heel up toward buttocks, keeping leg in straight line. Repeat 30 times, 2 sessions per day.  Use towel or pillowcase under heel as needed.       Straight Leg Raise        Bend left leg. Raise right leg 8-12 inches with knee locked. Exhale and tighten thigh muscles while raising leg.   Repeat 30 times, 2 sessions per day.           KNEE: Extension, Long Arc Quads - Sitting        Raise right leg until knee is straight.  Repeat 30 times, 2 sessions per day        KNEE: Flexion / Extension - Sitting        Sit at edge of surface, foot on towel or pillowcase. Bend and straighten right knee.  Repeat 30 times, 2 sessions per day.   Use opposite leg to increase knee flexion.    *Keep dressing dry and intact, do not remove dressing, if dressing becomes wet or bloody notify home health staff.  [facility] will change your dressing post of day #3 and day #10, give them that special dressing we sent home with you. If patient has a KETURAH system leave on for 7 days then change dressing.  *Continue incentive spirometry at least every 2 hours while awake.  *Continue white stockings remove 2 times a day for 1 hour and replace. Once dressing is changed they will apply the other stocking to surgery leg.  *Elevate surgery leg at ankle on pillow, no pillow under knees.  *Take laxative of choice to have a bowel movement at least by tomorrow and then every other day.  *Increase fluids by mouth.  *Dr. Mark Pemberton total knees should wear knee  immobilizer at night and remove during the day.  *Staples will be removed at follow up appointment  *Notify [facility]/home health staff if any concerns.

## 2024-03-11 NOTE — SUBJECTIVE & OBJECTIVE
Past Medical History:   Diagnosis Date    Arthritis     Hypertension     Thyroid disease        Past Surgical History:   Procedure Laterality Date     SECTION      TUBAL LIGATION         Review of patient's allergies indicates:  No Known Allergies    No current facility-administered medications on file prior to encounter.     Current Outpatient Medications on File Prior to Encounter   Medication Sig    atenoloL-chlorthalidone (TENORETIC) 50-25 mg Tab Take 1 tablet by mouth once daily.    allopurinoL (ZYLOPRIM) 100 MG tablet Take 1 tablet (100 mg total) by mouth once daily.    diclofenac sodium (VOLTAREN) 1 % Gel APPLY 2 GRAMS TO AFFECTED AREAS EVERY 6 HOURS AS NEEDED    gabapentin (NEURONTIN) 100 MG capsule Take 3 capsules (300 mg total) by mouth daily as needed (pain).    meloxicam (MOBIC) 7.5 MG tablet Take 1 tablet (7.5 mg total) by mouth once daily.    methocarbamoL (ROBAXIN) 750 MG Tab TAKE 1 TABLET BY MOUTH 4 TIMES DAILY AS NEEDED    nystatin-triamcinolone (MYCOLOG II) cream Apply 1 each topically.     Family History       Problem Relation (Age of Onset)    Cancer Mother, Brother, Brother    Heart disease Father          Tobacco Use    Smoking status: Never    Smokeless tobacco: Never   Substance and Sexual Activity    Alcohol use: Not Currently     Comment: occasionally    Drug use: Never    Sexual activity: Yes     Review of Systems   Constitutional:  Negative for appetite change, fatigue and fever.   HENT:  Negative for congestion, hearing loss and trouble swallowing.    Respiratory:  Negative for chest tightness, shortness of breath and wheezing.    Cardiovascular:  Negative for chest pain and palpitations.   Gastrointestinal:  Negative for abdominal pain, constipation and nausea.   Genitourinary:  Negative for difficulty urinating and dysuria.   Musculoskeletal:  Positive for arthralgias. Negative for back pain and neck stiffness.        Right total knee replacement this admission   Skin:   Negative for pallor and rash.   Neurological:  Negative for dizziness, speech difficulty and headaches.   Psychiatric/Behavioral:  Negative for confusion and suicidal ideas.         Snores but denies any excess daytime somnolence and does not feel she has a problem with sleep apnea     Objective:     Vital Signs (Most Recent):  Temp: 97.6 °F (36.4 °C) (03/11/24 1202)  Pulse: (!) 49 (03/11/24 1307)  Resp: 14 (03/11/24 1245)  BP: 123/67 (03/11/24 1245)  SpO2: 95 % (03/11/24 1307) Vital Signs (24h Range):  Temp:  [97.6 °F (36.4 °C)-98.1 °F (36.7 °C)] 97.6 °F (36.4 °C)  Pulse:  [48-62] 49  Resp:  [] 14  SpO2:  [95 %-99 %] 95 %  BP: (111-151)/(65-79) 123/67     Weight: 126.6 kg (279 lb)  Body mass index is 43.7 kg/m².     Physical Exam  Vitals reviewed.   Constitutional:       General: She is awake. She is not in acute distress.     Appearance: She is well-developed. She is morbidly obese. She is not toxic-appearing.   HENT:      Head: Normocephalic.      Nose: Nose normal.      Mouth/Throat:      Pharynx: Oropharynx is clear.   Eyes:      Extraocular Movements: Extraocular movements intact.      Pupils: Pupils are equal, round, and reactive to light.   Neck:      Thyroid: No thyroid mass.      Vascular: No carotid bruit.   Cardiovascular:      Rate and Rhythm: Regular rhythm. Bradycardia present.      Pulses: Normal pulses.      Heart sounds: Normal heart sounds. No murmur heard.  Pulmonary:      Effort: Pulmonary effort is normal.      Breath sounds: Normal breath sounds and air entry. No wheezing.   Abdominal:      General: Bowel sounds are normal. There is no distension.      Palpations: Abdomen is soft.      Tenderness: There is no abdominal tenderness.   Musculoskeletal:      Cervical back: Neck supple. No rigidity.      Comments: Changes of right total knee replacement this admission  Wearing immobilizer   Skin:     General: Skin is warm.      Coloration: Skin is not jaundiced.      Findings: No lesion.  "  Neurological:      General: No focal deficit present.      Mental Status: She is alert and oriented to person, place, and time.      Cranial Nerves: No cranial nerve deficit.   Psychiatric:         Attention and Perception: Attention normal.         Mood and Affect: Mood normal.         Behavior: Behavior normal. Behavior is cooperative.         Thought Content: Thought content normal.         Cognition and Memory: Cognition normal.          Significant Labs: All pertinent labs within the past 24 hours have been reviewed.  BMP: No results for input(s): "GLU", "NA", "K", "CL", "CO2", "BUN", "CREATININE", "CALCIUM", "MG" in the last 48 hours.  CBC: No results for input(s): "WBC", "HGB", "HCT", "PLT" in the last 48 hours.  CMP: No results for input(s): "NA", "K", "CL", "CO2", "GLU", "BUN", "CREATININE", "CALCIUM", "PROT", "ALBUMIN", "BILITOT", "ALKPHOS", "AST", "ALT", "ANIONGAP", "EGFRNONAA" in the last 48 hours.    Invalid input(s): "ESTGFAFRICA"    Significant Imaging: I have reviewed all pertinent imaging results/findings within the past 24 hours.      Intake/Output - Last 3 Shifts         03/09 0700  03/10 0659 03/10 0700  03/11 0659 03/11 0700  03/12 0659    P.O.   237    I.V. (mL/kg)   750 (5.9)    IV Piggyback   250    Total Intake(mL/kg)   1237 (9.8)    Blood   15    Total Output   15    Net   +1222                 Microbiology Results (last 7 days)       ** No results found for the last 168 hours. **            "

## 2024-03-11 NOTE — PT/OT/SLP EVAL
"Physical Therapy Evaluation and Treatment    Patient Name: Angela Landaverde   MRN: [id number]  Recent Surgery: Procedure(s) (LRB):  ROBOTIC ARTHROPLASTY, KNEE, TOTAL (Right) Day of Surgery    Recommendations:     Discharge Recommendations: High Intensity Therapy (vs low intensity depending on insurance approval)   Discharge Equipment Recommendations: walker, rolling   Barriers to discharge: Increased level of assist and Decreased caregiver support; hoping to be approved for inpatient rehab but may have to go home with HHPT.    Assessment:     Angela Landaverde is a 63 y.o. female admitted with a medical diagnosis of Osteoarthritis of right knee. She presents with the following impairments/functional limitations: weakness, impaired endurance, impaired self care skills, impaired functional mobility, gait instability, impaired balance, decreased lower extremity function, pain, decreased ROM, orthopedic precautions (morbid obesity).     Patient participated well with PT evaluation with pain well controlled by block. Patient is eager to complete the rehab process to be able to return to work. Patient would benefit from high intensity rehab at d/c but her insurance may only approve low intensity. Pt will follow to ensure safe mobility as block wears off/pain increases.     Rehab Prognosis: Good; patient would benefit from acute PT services to address these deficits and reach maximum level of function.    Plan:     During this hospitalization, patient to be seen BID (5x/week; daily 2x/week) to address the above listed problems via gait training, therapeutic activities, therapeutic exercises    Plan of Care Expires: 04/11/24    Subjective     Chief Complaint: Osteoarthritis of right knee   Patient Comments/Goals: "I live alone and my daughter works so I don't know how it will be if I have to go home. I have [employer] insurance so I don't know if I can go to rehab."  Pain/Comfort:  Pain Rating 1: 0/10 (block in effect)  Location - Side 1: " Right  Location 1: knee  Pain Rating Post-Intervention 1: 0/10    Social History:  Living Environment: Patient lives alone in a mobile home with number of outside stair(s): 3/rail  Prior Level of Function: Prior to admission, patient was independent and driving and working as dietary supervisor at [employer]  Equipment Used at Home: cane, straight (RW/BSC borrowed from family but pt would like her own)  DME owned (not currently used): none  Assistance Upon Discharge: family and home health vs rehab staff    Objective:     Communicated with Julia Bob LPN prior to session. Patient found supine with knee immobilizer, peripheral IV, blood pressure cuff, pulse ox (continuous), telemetry, wound vac (KETURAH vac) upon PT entry to room.    General Precautions: Standard, fall (morbid obesity)   Orthopedic Precautions: RLE weight bearing as tolerated   Braces: Knee immobilizer    Respiratory Status: Room air    Exams:  RLE ROM: Deficits: hip WFL ;knee in KI; ankle WFL  RLE Strength: Deficits: hip WFL; knee not tested; ankle WFL  LLE ROM: WFL  LLE Strength: WFL  Cognitive: Patient is oriented to Person, Place, Time, Situation  Sensation: decreased anterior knee due to block    Functional Mobility:  Gait belt applied - Yes  Bed Mobility  Supine to Sit: minimum assistance for LE management  Sit to Supine: minimum assistance for LE management  Transfers  Sit to Stand: minimum assistance and verbal cues with rolling walker and with cues for hand placement, foot placement, weight bearing precautions, and sequencing  Toilet Transfer: contact guard assistance and verbal cues with rolling walker and wall bar and with cues for hand placement, foot placement, weight bearing precautions, and sequencing using Step Transfer  Gait  Patient ambulated 25' x 2 trials with rolling walker and contact guard assistance. Patient required cues for position in walker, sequencing, upright posture, maintaining knee extension in stance phase and weight  bearing status to increase independence and safety. Patient required cues ~ 50% of the time.  Balance  Sitting: GOOD: Maintains balance through MODERATE excursions of active trunk movement  Standing: FAIR: Needs CONTACT GUARD during gait using RW/KI      Therapeutic Activities and Exercises:  Patient educated on role of acute care PT and PT POC, safety while in hospital including calling nurse for mobility, and call light usage.  Educated about weightbearing precautions and provided cuing for adherence as appropriate during session.  Educated about importance of OOB mobility and remaining up in chair most of the day.  Home health vs inpatient rehab    AM-PAC 6 CLICK MOBILITY  Total Score:16    Patient left HOB elevated with all lines intact, call button in reach, RN notified, and family present.    GOALS:   Multidisciplinary Problems       Physical Therapy Goals          Problem: Physical Therapy    Goal Priority Disciplines Outcome Goal Variances Interventions   Physical Therapy Goal     PT, PT/OT Ongoing, Progressing     Description: Short Term Goals to be met by: 3/25/2024    Patient will increase functional independence with mobility by performin. Supine to sit with Modified [place]  2. Sit to stand transfer with Modified [place]  3. Bed to chair transfer with Modified [place] using Rolling Walker, WBAT right LE  4. Gait  x 100 feet with Modified [place] using Rolling Walker, WBAT right LE.   5. Lower extremity exercise program x30 reps per handout, with assistance as needed  6. Knee ROM 0-90    Long Term Goals to be met by: 2024    Pt will regain full independent functional mobility to return to prior activities of daily living.                        History:     Past Medical History:   Diagnosis Date    Arthritis     Hypertension     Thyroid disease        Past Surgical History:   Procedure Laterality Date     SECTION      TUBAL LIGATION         Time Tracking:     PT  Received On: 03/11/24  PT Start Time: 1552  PT Stop Time: 1618  PT Total Time (min): 26 min     Billable Minutes: Evaluation Low complexity    3/11/2024

## 2024-03-11 NOTE — ANESTHESIA POSTPROCEDURE EVALUATION
Anesthesia Post Evaluation    Patient: Angela Landaverde    Procedure(s) Performed: Procedure(s) (LRB):  ROBOTIC ARTHROPLASTY, KNEE, TOTAL (Right)    Final Anesthesia Type: general      Patient location during evaluation: PACU  Patient participation: Yes- Able to Participate  Level of consciousness: awake and sedated  Post-procedure vital signs: reviewed and stable  Pain management: adequate  Airway patency: patent    PONV status at discharge: No PONV  Anesthetic complications: no      Cardiovascular status: blood pressure returned to baseline  Respiratory status: unassisted  Hydration status: euvolemic  Follow-up not needed.              Vitals Value Taken Time   /70 03/11/24 1237   Temp 36.4 °C (97.6 °F) 03/11/24 1202   Pulse 48 03/11/24 1240   Resp 13 03/11/24 1240   SpO2 96 % 03/11/24 1240   Vitals shown include unvalidated device data.      No case tracking events are documented in the log.      Pain/Arnav Score: Arnav Score: 9 (3/11/2024 12:00 PM)

## 2024-03-11 NOTE — PLAN OF CARE
Problem: Physical Therapy  Goal: Physical Therapy Goal  Description: Short Term Goals to be met by: 3/25/2024    Patient will increase functional independence with mobility by performin. Supine to sit with Modified [place]  2. Sit to stand transfer with Modified [place]  3. Bed to chair transfer with Modified [place] using Rolling Walker, WBAT right LE  4. Gait  x 100 feet with Modified [place] using Rolling Walker, WBAT right LE.   5. Lower extremity exercise program x30 reps per handout, with assistance as needed  6. Knee ROM 0-90    Long Term Goals to be met by: 2024    Pt will regain full independent functional mobility to return to prior activities of daily living.   Outcome: Ongoing, Progressing       Patient participated well with PT evaluation with pain well controlled by block. Patient is eager to complete the rehab process to be able to return to work. Patient would benefit from high intensity rehab at d/c but her insurance may only approve low intensity. Pt will follow to ensure safe mobility as block wears off/pain increases.

## 2024-03-11 NOTE — PT/OT/SLP EVAL
"Occupational Therapy Evaluation     Name: Angela Landaverde  MRN: [id number]  Admitting Diagnosis: Osteoarthritis of right knee Day of Surgery  Recent Surgery: Procedure(s) (LRB):  ROBOTIC ARTHROPLASTY, KNEE, TOTAL (Right) Day of Surgery    Recommendations:     Discharge Recommendations: High Intensity Therapy (verses low intensity)  Level of Assistance Recommended: Intermittent assistance  Discharge Equipment Recommendations: walker, rolling  Barriers to discharge:      Assessment:     Angela Landaverde is a 63 y.o. female with a medical diagnosis of Osteoarthritis of right knee. She presents with performance deficits affecting function including weakness, impaired self care skills, impaired functional mobility, gait instability, decreased lower extremity function, pain, impaired endurance, orthopedic precautions.     Rehab Prognosis: Good; patient would benefit from acute OT services to address these deficits and reach maximum level of function.    Plan:     Patient to be seen 5 x/week to address the above listed problems via self-care/home management, therapeutic activities, therapeutic exercises  Plan of Care Expires: 03/18/24  Plan of Care Reviewed with: patient, daughter    Subjective     Chief Complaint: post op (R) TKR today  Patient Comments/Goals: Pt states, "My daughter works, but when I get home, I will make it."  Pain/Comfort:  Pain Rating 1: 0/10  Pain Rating Post-Intervention 1: 0/10    Patients cultural, spiritual, Sikhism conflicts given the current situation: no    Social History:  Living Environment: Patient lives with their child(daron) in a mobile home with number of outside stair(s): 3 steps  Prior Level of Function: Prior to admission, patient was modified independent with ADLs using straight cane for mobility.  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  Equipment Used at Home: cane, straight (BSC and RW are borrowed, but pt would like her " own)  DME owned (not currently used): none  Assistance Upon Discharge:  family verses facility staff     Objective:     Communicated with [place] Bob prior to session. Patient found HOB elevated with knee immobilizer, peripheral IV, pulse ox (continuous), telemetry, wound vac upon OT entry to room.    General Precautions: Standard, fall   Orthopedic Precautions: RLE weight bearing as tolerated   Braces: Knee immobilizer    Respiratory Status: Room air    Occupational Performance    Gait belt applied - Yes    Bed Mobility:   Supine to sit from right side of bed with minimum assistance and of 2 persons  Sit to Supine with minimum assistance on right side of bed    Functional Mobility/Transfers:  Sit <> Stand Transfer with minimum assistance with rolling walker  Toilet Transfer Step Transfer technique with minimum assistance with rolling walker and knee immobilizer and verbal cues  Functional Mobility:  Pt was initially having difficulty maintaining extension on (R) LE while stepping on it, but improved with trial and heavy cues     Activities of Daily Living:  Grooming: independence  Toileting: minimum assistance for clothes management    Cognitive/Visual Perceptual:  Cognitive/Psychosocial Skills:    -     Oriented to: Person, Place, Time, Situation  -     Follows Commands/attention: Follows one-step commands  -     Communication: clear/fluent  -     Safety awareness/insight to disability: intact  -     Mood/Affect/Coping skills/emotional control: Cooperative    Physical Exam:  Balance:    -     Sitting: independence  -     Standing: contact guard assistance and RW  Dominant hand: Right  Upper Extremity Range of Motion:     -       Right Upper Extremity: WNL  -       Left Upper Extremity: WNL  Upper Extremity Strength:    -       Right Upper Extremity: WNL  -       Left Upper Extremity: WNL   Strength:    -       Right Upper Extremity: WNL  -       Left Upper Extremity: WNL  Fine Motor Coordination:    -        Intact  Gross motor coordination:   WFL    AMPAC 6 Click ADL:  AMPAC Total Score: 19    Treatment & Education:  Educated on the importance of mobility to maximize recovery  Educated on safety with functional mobility; hand placement to ensure safe transfers to various surfaces in prep for ADLs  Educated on weight bearing status  Will continue to educate as needed  OT treatment plan    Patient clear to ambulate to/from bathroom with RN/PCT, assist x1 .    Patient left HOB elevated with all lines intact, call button in reach, RN notified, and family present.    GOALS:   Multidisciplinary Problems       Occupational Therapy Goals          Problem: Occupational Therapy    Goal Priority Disciplines Outcome Interventions   Occupational Therapy Goal     OT, PT/OT Ongoing, Progressing    Description: STG: (in 1 week)  1.Pt will perform bathing with setup and SBA  2.Pt will perform UE dressing with independence  3.Pt will perform LE dressing with min(A)  4.Pt will transfer bed/chair/bsc with mod(I) with RW  5.Pt will perform standing task x 3 min with SBA with RW  6.Tolerate 15 min of tx without fatigue.      LTG: (in 5 weeks)  Restore to max I with selfcare and mobility.                          History:     Past Medical History:   Diagnosis Date    Arthritis     Hypertension     Thyroid disease          Past Surgical History:   Procedure Laterality Date     SECTION      TUBAL LIGATION         Time Tracking:     OT Date of Treatment: 24  OT Start Time: 1552  OT Stop Time: 1618  OT Total Time (min): 26 min    Billable Minutes: Evaluation low complexity    3/11/2024

## 2024-03-11 NOTE — TRANSFER OF CARE
"Anesthesia Transfer of Care Note    Patient: Angela Landaverde    Procedure(s) Performed: Procedure(s) (LRB):  ROBOTIC ARTHROPLASTY, KNEE, TOTAL (Right)    Patient location: PACU    Anesthesia Type: general, regional and spinal    Transport from OR: Transported from OR on room air with adequate spontaneous ventilation    Post pain: adequate analgesia    Post assessment: no apparent anesthetic complications    Post vital signs: stable    Level of consciousness: responds to stimulation    Nausea/Vomiting: no nausea/vomiting    Complications: none    Transfer of care protocol was followed      Last vitals: Visit Vitals  /78 (BP Location: Right arm, Patient Position: Lying)   Pulse 62   Temp 36.4 °C (97.6 °F) (Oral)   Resp 18   Ht 5' 7" (1.702 m)   Wt 126.6 kg (279 lb)   SpO2 99%   Breastfeeding No   BMI 43.70 kg/m²     "

## 2024-03-11 NOTE — PLAN OF CARE
[facility]  Initial Discharge Assessment       Primary Care Provider: Joana Parmar DO    Admission Diagnosis: Osteoarthritis of right knee, unspecified osteoarthritis type [M17.11]  Osteoarthritis of right knee [M17.11]    Admission Date: 3/11/2024  Expected Discharge Date:     Transition of Care Barriers: None    Payor: [employer] / Plan: [employer] [facility] [employer] [place] / Product Type: Commercial /     Extended Emergency Contact Information  Primary Emergency Contact: lopez farheenhardy  Mobile Phone: [phone]  Relation: Sister  Preferred language: English   needed? No    Discharge Plan A: Home, Home Health  Discharge Plan B: Home, Home Health      [facility] [address] [place] [address] [address]  [address] [facility] [address] [facility] [address]  Phone: [phone] Fax: [phone]    [facility] [address] [place] [address] - [address] [facility]  [address] [facility] [address]  Phone: [phone] Fax: [phone]    [facility] [place] - [address] [place] [address]  [address] [place] [id number]  Phone: [phone] Fax: [phone]      Initial Assessment (most recent)       Adult Discharge Assessment - 03/11/24 1450          Discharge Assessment    Assessment Type Discharge Planning Assessment     Source of Information patient;family     Communicated [place]MBERLY with patient/caregiver Date not available/Unable to determine     People in Home alone     Do you expect to return to your current living situation? Yes     Do you have help at home or someone to help you manage your care at home? No     Prior to hospitilization cognitive status: Alert/Oriented     Current cognitive status: Alert/Oriented     Walking or Climbing Stairs Difficulty yes     Walking or Climbing Stairs ambulation difficulty, requires equipment     Mobility Management uses cane     Dressing/Bathing Difficulty no     Equipment Currently Used at  Home cane, straight     Patient currently being followed by outpatient case management? No     Do you currently have service(s) that help you manage your care at home? No     Do you take prescription medications? Yes     Do you have any problems affording any of your prescribed medications? No     Is the patient taking medications as prescribed? yes     Who is going to help you get home at discharge? family daughter Ariella [phone]     How do you get to doctors appointments? car, drives self;family or friend will provide     Are you on dialysis? No     Do you take coumadin? No     Discharge Plan A Home;Home Health     Discharge Plan B Home;Home Health     DME Needed Upon Discharge  walker, rolling     Discharge Plan discussed with: Patient;Adult children     Transition of Care Barriers None                   Consult for dc planning after routine ortho surgery. Spoke with patient in her room. She lives alone. No home services. She is employed. She uses a cane sometimes. Daughter Ariella is at the bedside. Patient and daughter state that Dr Pemberton said patient needed to go to inpatient rehab at [place]. Spoke with patient about dc plan and that inpatient rehab requires approval by patient insurance and that patient may dc home as soon as tomorrow but will clarify with physician. Sent him a secure chat. Patient did give verbal choice for [facility] for home rehab. If patient dc home will need rolling walker. Other equipment bedside commode and tub transfer bench will fax to [facility] if dc plan is home. Cm will follow.

## 2024-03-11 NOTE — CONSULTS
[facility]  [facility] Medicine  Consult Note    Patient Name: Angela Landaverde  MRN: [id number]  Admission Date: 3/11/2024  Hospital Length of Stay: 0 days  Attending Physician: Mark Pemberton MD   Primary Care Provider: Joana Parmar DO           Patient information was obtained from patient, parent, relative(s), past medical records, and ER records.     Consults  Subjective:     Principal Problem: Osteoarthritis of right knee    Chief Complaint: No chief complaint on file.       HPI: Thank you for consult    Chief complaint:  Right knee pain.    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  Patient with long history of bilateral knee pain worse on the right.  Denies any specific injury and states she has been told she had arthritis of her knees.  States right knee is much worse but she has been told she will need surgery on her left knee at some point in the future.  Patient apparently did well with surgery.  Hospitalist asked to follow medically.  At discharge patient plans to rehab in swing bed.  She lives alone as outpatient.    Review of systems:  See above.  No recent fever illness.  Bilateral knee pain as above mentioned that affects ambulation.  History of being a snore but denies any nocturnal wakening.  Denies any excess somnolence during the day.  She does not feel that she has any significant problems with obstructive sleep apnea.  I have told her if she feels not rested from sleep she should discuss with her primary care provider for arranging sleep evaluation.  Review of systems otherwise negative.    Past medical history:  - degenerative joint disease as above mentioned particularly to both knees  -hypertension greater than 5 years  -hypothyroidism on replacement therapy  -gout which patient states bothers her feet and her toes fairly often more than once a month    Past surgical  history:  Right total knee replacement this admission  Prior  section    No known medication allergies    Social history:  Lives by herself  Has daughters that check on her  No history of tobacco alcohol use    Family history:  Several and family cancer including mother and 2 brothers that had lung cancer.  1 brother recently  of lung cancer.  Patient states all family members that had lung cancer were smokers.  Told her that smokers can get a screening CT scan yearly to screen for lung cancer.    Health maintenance issues:  Primary care providers Dr. Joana Parmar  Had flu shot this past fall  No prior Pneumovax  No known COVID-19 infection  COVID vaccination x3  Yearly mammogram  2 years since last Pap smear  States had Cologuard several years ago and told unremarkable but that she needs to repeat it again soon        Past Medical History:   Diagnosis Date    Arthritis     Hypertension     Thyroid disease        Past Surgical History:   Procedure Laterality Date     SECTION      TUBAL LIGATION         Review of patient's allergies indicates:  No Known Allergies    No current facility-administered medications on file prior to encounter.     Current Outpatient Medications on File Prior to Encounter   Medication Sig    atenoloL-chlorthalidone (TENORETIC) 50-25 mg Tab Take 1 tablet by mouth once daily.    allopurinoL (ZYLOPRIM) 100 MG tablet Take 1 tablet (100 mg total) by mouth once daily.    diclofenac sodium (VOLTAREN) 1 % Gel APPLY 2 GRAMS TO AFFECTED AREAS EVERY 6 HOURS AS NEEDED    gabapentin (NEURONTIN) 100 MG capsule Take 3 capsules (300 mg total) by mouth daily as needed (pain).    meloxicam (MOBIC) 7.5 MG tablet Take 1 tablet (7.5 mg total) by mouth once daily.    methocarbamoL (ROBAXIN) 750 MG Tab TAKE 1 TABLET BY MOUTH 4 TIMES DAILY AS NEEDED    nystatin-triamcinolone (MYCOLOG II) cream Apply 1 each topically.     Family History       Problem Relation (Age of Onset)    Cancer Mother,  Brother, Brother    Heart disease Father          Tobacco Use    Smoking status: Never    Smokeless tobacco: Never   Substance and Sexual Activity    Alcohol use: Not Currently     Comment: occasionally    Drug use: Never    Sexual activity: Yes     Review of Systems   Constitutional:  Negative for appetite change, fatigue and fever.   HENT:  Negative for congestion, hearing loss and trouble swallowing.    Respiratory:  Negative for chest tightness, shortness of breath and wheezing.    Cardiovascular:  Negative for chest pain and palpitations.   Gastrointestinal:  Negative for abdominal pain, constipation and nausea.   Genitourinary:  Negative for difficulty urinating and dysuria.   Musculoskeletal:  Positive for arthralgias. Negative for back pain and neck stiffness.        Right total knee replacement this admission   Skin:  Negative for pallor and rash.   Neurological:  Negative for dizziness, speech difficulty and headaches.   Psychiatric/Behavioral:  Negative for confusion and suicidal ideas.         Snores but denies any excess daytime somnolence and does not feel she has a problem with sleep apnea     Objective:     Vital Signs (Most Recent):  Temp: 97.6 °F (36.4 °C) (03/11/24 1202)  Pulse: (!) 49 (03/11/24 1307)  Resp: 14 (03/11/24 1245)  BP: 123/67 (03/11/24 1245)  SpO2: 95 % (03/11/24 1307) Vital Signs (24h Range):  Temp:  [97.6 °F (36.4 °C)-98.1 °F (36.7 °C)] 97.6 °F (36.4 °C)  Pulse:  [48-62] 49  Resp:  [] 14  SpO2:  [95 %-99 %] 95 %  BP: (111-151)/(65-79) 123/67     Weight: 126.6 kg (279 lb)  Body mass index is 43.7 kg/m².     Physical Exam  Vitals reviewed.   Constitutional:       General: She is awake. She is not in acute distress.     Appearance: She is well-developed. She is morbidly obese. She is not toxic-appearing.   HENT:      Head: Normocephalic.      Nose: Nose normal.      Mouth/Throat:      Pharynx: Oropharynx is clear.   Eyes:      Extraocular Movements: Extraocular movements intact.  "     Pupils: Pupils are equal, round, and reactive to light.   Neck:      Thyroid: No thyroid mass.      Vascular: No carotid bruit.   Cardiovascular:      Rate and Rhythm: Regular rhythm. Bradycardia present.      Pulses: Normal pulses.      Heart sounds: Normal heart sounds. No murmur heard.  Pulmonary:      Effort: Pulmonary effort is normal.      Breath sounds: Normal breath sounds and air entry. No wheezing.   Abdominal:      General: Bowel sounds are normal. There is no distension.      Palpations: Abdomen is soft.      Tenderness: There is no abdominal tenderness.   Musculoskeletal:      Cervical back: Neck supple. No rigidity.      Comments: Changes of right total knee replacement this admission  Wearing immobilizer   Skin:     General: Skin is warm.      Coloration: Skin is not jaundiced.      Findings: No lesion.   Neurological:      General: No focal deficit present.      Mental Status: She is alert and oriented to person, place, and time.      Cranial Nerves: No cranial nerve deficit.   Psychiatric:         Attention and Perception: Attention normal.         Mood and Affect: Mood normal.         Behavior: Behavior normal. Behavior is cooperative.         Thought Content: Thought content normal.         Cognition and Memory: Cognition normal.          Significant Labs: All pertinent labs within the past 24 hours have been reviewed.  BMP: No results for input(s): "GLU", "NA", "K", "CL", "CO2", "BUN", "CREATININE", "CALCIUM", "MG" in the last 48 hours.  CBC: No results for input(s): "WBC", "HGB", "HCT", "PLT" in the last 48 hours.  CMP: No results for input(s): "NA", "K", "CL", "CO2", "GLU", "BUN", "CREATININE", "CALCIUM", "PROT", "ALBUMIN", "BILITOT", "ALKPHOS", "AST", "ALT", "ANIONGAP", "EGFRNONAA" in the last 48 hours.    Invalid input(s): "ESTGFAFRICA"    Significant Imaging: I have reviewed all pertinent imaging results/findings within the past 24 hours.      Intake/Output - Last 3 Shifts         03/09 " 0700  03/10 0659 03/10 0700  03/11 0659 03/11 0700  03/12 0659    P.O.   237    I.V. (mL/kg)   750 (5.9)    IV Piggyback   250    Total Intake(mL/kg)   1237 (9.8)    Blood   15    Total Output   15    Net   +1222                 Microbiology Results (last 7 days)       ** No results found for the last 168 hours. **            Assessment/Plan:     * Osteoarthritis of right knee  Surgery 03/11/24  Therapy team to see  Patient plans to rehab at swing bed  She lives alone at home      Severe obesity (BMI >= 40)  Body mass index is 43.7 kg/m². Morbid obesity complicates all aspects of disease management from diagnostic modalities to treatment. Weight loss encouraged and health benefits explained to patient.         Bradycardia    Likely from beta-blocker but will check thyroid levels watch on monitor  Ordered EKG and Echo    Osteoarthritis of left knee    States pain not as bad as on the right side feels she will need surgery on left side at some point in the future    Hypothyroid    Continue with thyroid replacement therapy  With bradycardia rechecked thyroid levels    Hyperlipidemia    Continue statin      VTE Risk Mitigation (From admission, onward)           Ordered     IP VTE LOW RISK PATIENT  Once         03/11/24 1314     Place ALEXIS hose  Until discontinued         03/11/24 1314     Place sequential compression device  Until discontinued         03/11/24 1314                        Thank you for your consult. I will follow-up with patient. Please contact us if you have any additional questions.    Bryan Dunn MD  Department of Hospital Medicine   [facility]

## 2024-03-11 NOTE — HPI
Thank you for consult    Chief complaint:  Right knee pain.    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  Patient with long history of bilateral knee pain worse on the right.  Denies any specific injury and states she has been told she had arthritis of her knees.  States right knee is much worse but she has been told she will need surgery on her left knee at some point in the future.  Patient apparently did well with surgery.  Hospitalist asked to follow medically.  At discharge patient plans to rehab in swing bed.  She lives alone as outpatient.    Review of systems:  See above.  No recent fever illness.  Bilateral knee pain as above mentioned that affects ambulation.  History of being a snore but denies any nocturnal wakening.  Denies any excess somnolence during the day.  She does not feel that she has any significant problems with obstructive sleep apnea.  I have told her if she feels not rested from sleep she should discuss with her primary care provider for arranging sleep evaluation.  Review of systems otherwise negative.    Past medical history:  - degenerative joint disease as above mentioned particularly to both knees  -hypertension greater than 5 years  -hypothyroidism on replacement therapy  -gout which patient states bothers her feet and her toes fairly often more than once a month    Past surgical history:  Right total knee replacement this admission  Prior  section    No known medication allergies    Social history:  Lives by herself  Has daughters that check on her  No history of tobacco alcohol use    Family history:  Several and family cancer including mother and 2 brothers that had lung cancer.  1 brother recently  of lung cancer.  Patient states all family members that had lung cancer were smokers.  Told her that smokers can get a screening CT scan yearly to screen for lung  cancer.    Health maintenance issues:  Primary care providers Dr. Joana Parmar  Had flu shot this past fall  No prior Pneumovax  No known COVID-19 infection  COVID vaccination x3  Yearly mammogram  2 years since last Pap smear  States had Cologuard several years ago and told unremarkable but that she needs to repeat it again soon

## 2024-03-12 VITALS
TEMPERATURE: 98 F | SYSTOLIC BLOOD PRESSURE: 115 MMHG | OXYGEN SATURATION: 97 % | RESPIRATION RATE: 18 BRPM | HEIGHT: 67 IN | WEIGHT: 279 LBS | DIASTOLIC BLOOD PRESSURE: 70 MMHG | BODY MASS INDEX: 43.79 KG/M2 | HEART RATE: 49 BPM

## 2024-03-12 LAB
25(OH)D3 SERPL-MCNC: 8.3 NG/ML
ANION GAP SERPL CALCULATED.3IONS-SCNC: 8 MMOL/L (ref 7–16)
AORTIC ROOT ANNULUS: 2.62 CM
AORTIC VALVE CUSP SEPERATION: 2.28 CM
AV INDEX (PROSTH): 0.88
AV MEAN GRADIENT: 5 MMHG
AV PEAK GRADIENT: 11 MMHG
AV VALVE AREA BY VELOCITY RATIO: 2.18 CM²
AV VALVE AREA: 2.18 CM²
AV VELOCITY RATIO: 0.88
BASOPHILS # BLD AUTO: 0.01 K/UL (ref 0–0.2)
BASOPHILS NFR BLD AUTO: 0.1 % (ref 0–1)
BSA FOR ECHO PROCEDURE: 2.45 M2
BUN SERPL-MCNC: 13 MG/DL (ref 7–18)
BUN/CREAT SERPL: 14 (ref 6–20)
CALCIUM SERPL-MCNC: 8.4 MG/DL (ref 8.5–10.1)
CHLORIDE SERPL-SCNC: 107 MMOL/L (ref 98–107)
CO2 SERPL-SCNC: 29 MMOL/L (ref 21–32)
CREAT SERPL-MCNC: 0.93 MG/DL (ref 0.55–1.02)
CV ECHO LV RWT: 0.68 CM
DIFFERENTIAL METHOD BLD: ABNORMAL
DOP CALC AO PEAK VEL: 1.68 M/S
DOP CALC AO VTI: 38 CM
DOP CALC LVOT AREA: 2.5 CM2
DOP CALC LVOT DIAMETER: 1.78 CM
DOP CALC LVOT PEAK VEL: 1.47 M/S
DOP CALC LVOT STROKE VOLUME: 82.82 CM3
DOP CALCLVOT PEAK VEL VTI: 33.3 CM
E WAVE DECELERATION TIME: 225.36 MSEC
E/A RATIO: 1.29
E/E' RATIO: 8.9 M/S
ECHO LV POSTERIOR WALL: 1.44 CM (ref 0.6–1.1)
EGFR (NO RACE VARIABLE) (RUSH/TITUS): 70 ML/MIN/1.73M2
EJECTION FRACTION: 60 %
EOSINOPHIL # BLD AUTO: 0 K/UL (ref 0–0.5)
EOSINOPHIL NFR BLD AUTO: 0 % (ref 1–4)
ERYTHROCYTE [DISTWIDTH] IN BLOOD BY AUTOMATED COUNT: 11.9 % (ref 11.5–14.5)
FRACTIONAL SHORTENING: 31 % (ref 28–44)
GLUCOSE SERPL-MCNC: 113 MG/DL (ref 74–106)
HCT VFR BLD AUTO: 30.9 % (ref 38–47)
HGB BLD-MCNC: 10.3 G/DL (ref 12–16)
IMM GRANULOCYTES # BLD AUTO: 0.03 K/UL (ref 0–0.04)
IMM GRANULOCYTES NFR BLD: 0.3 % (ref 0–0.4)
INTERVENTRICULAR SEPTUM: 1.33 CM (ref 0.6–1.1)
IVC DIAMETER: 1.65 CM
LA MAJOR: 3.22 CM
LEFT ATRIUM SIZE: 2.98 CM
LEFT INTERNAL DIMENSION IN SYSTOLE: 2.94 CM (ref 2.1–4)
LEFT VENTRICLE DIASTOLIC VOLUME INDEX: 34.94 ML/M2
LEFT VENTRICLE DIASTOLIC VOLUME: 81.42 ML
LEFT VENTRICLE MASS INDEX: 97 G/M2
LEFT VENTRICLE SYSTOLIC VOLUME INDEX: 14.3 ML/M2
LEFT VENTRICLE SYSTOLIC VOLUME: 33.43 ML
LEFT VENTRICULAR INTERNAL DIMENSION IN DIASTOLE: 4.26 CM (ref 3.5–6)
LEFT VENTRICULAR MASS: 225.34 G
LV LATERAL E/E' RATIO: 8.9 M/S
LV SEPTAL E/E' RATIO: 8.9 M/S
LVOT MG: 5.23 MMHG
LVOT MV: 1.1 CM/S
LYMPHOCYTES # BLD AUTO: 0.71 K/UL (ref 1–4.8)
LYMPHOCYTES NFR BLD AUTO: 7.5 % (ref 27–41)
MCH RBC QN AUTO: 29.3 PG (ref 27–31)
MCHC RBC AUTO-ENTMCNC: 33.3 G/DL (ref 32–36)
MCV RBC AUTO: 87.8 FL (ref 80–96)
MONOCYTES # BLD AUTO: 0.89 K/UL (ref 0–0.8)
MONOCYTES NFR BLD AUTO: 9.3 % (ref 2–6)
MPC BLD CALC-MCNC: 10.7 FL (ref 9.4–12.4)
MV PEAK A VEL: 0.69 M/S
MV PEAK E VEL: 0.89 M/S
NEUTROPHILS # BLD AUTO: 7.88 K/UL (ref 1.8–7.7)
NEUTROPHILS NFR BLD AUTO: 82.8 % (ref 53–65)
NRBC # BLD AUTO: 0 X10E3/UL
NRBC, AUTO (.00): 0 %
PISA TR MAX VEL: 2.47 M/S
PLATELET # BLD AUTO: 193 K/UL (ref 150–400)
POTASSIUM SERPL-SCNC: 3.1 MMOL/L (ref 3.5–5.1)
PV PEAK GRADIENT: 3 MMHG
PV PEAK VELOCITY: 0.84 M/S
RA MAJOR: 4.38 CM
RA PRESSURE ESTIMATED: 3 MMHG
RBC # BLD AUTO: 3.52 M/UL (ref 4.2–5.4)
RIGHT VENTRICULAR END-DIASTOLIC DIMENSION: 3.86 CM
RV TB RVSP: 5 MMHG
SODIUM SERPL-SCNC: 141 MMOL/L (ref 136–145)
T4 SERPL-MCNC: 7.9 ΜG/DL (ref 4.8–13.9)
TDI LATERAL: 0.1 M/S
TDI SEPTAL: 0.1 M/S
TDI: 0.1 M/S
TR MAX PG: 24 MMHG
TRICUSPID ANNULAR PLANE SYSTOLIC EXCURSION: 2.11 CM
TSH SERPL DL<=0.005 MIU/L-ACNC: 0.35 UIU/ML (ref 0.36–3.74)
TV REST PULMONARY ARTERY PRESSURE: 27 MMHG
URATE SERPL-MCNC: 6.1 MG/DL (ref 2.6–6)
WBC # BLD AUTO: 9.52 K/UL (ref 4.5–11)
Z-SCORE OF LEFT VENTRICULAR DIMENSION IN END DIASTOLE: -7.96
Z-SCORE OF LEFT VENTRICULAR DIMENSION IN END SYSTOLE: -5.21

## 2024-03-12 PROCEDURE — 82306 VITAMIN D 25 HYDROXY: CPT | Performed by: HOSPITALIST

## 2024-03-12 PROCEDURE — 97116 GAIT TRAINING THERAPY: CPT

## 2024-03-12 PROCEDURE — 85025 COMPLETE CBC W/AUTO DIFF WBC: CPT | Performed by: ORTHOPAEDIC SURGERY

## 2024-03-12 PROCEDURE — 25000003 PHARM REV CODE 250: Performed by: HOSPITALIST

## 2024-03-12 PROCEDURE — 25000003 PHARM REV CODE 250: Performed by: ORTHOPAEDIC SURGERY

## 2024-03-12 PROCEDURE — 80048 BASIC METABOLIC PNL TOTAL CA: CPT | Performed by: ORTHOPAEDIC SURGERY

## 2024-03-12 PROCEDURE — 63600175 PHARM REV CODE 636 W HCPCS: Performed by: ORTHOPAEDIC SURGERY

## 2024-03-12 PROCEDURE — 84436 ASSAY OF TOTAL THYROXINE: CPT | Performed by: HOSPITALIST

## 2024-03-12 PROCEDURE — 97110 THERAPEUTIC EXERCISES: CPT

## 2024-03-12 PROCEDURE — 84550 ASSAY OF BLOOD/URIC ACID: CPT | Performed by: HOSPITALIST

## 2024-03-12 PROCEDURE — 84443 ASSAY THYROID STIM HORMONE: CPT | Performed by: HOSPITALIST

## 2024-03-12 PROCEDURE — 94761 N-INVAS EAR/PLS OXIMETRY MLT: CPT

## 2024-03-12 RX ORDER — OXYCODONE AND ACETAMINOPHEN 10; 325 MG/1; MG/1
1 TABLET ORAL EVERY 6 HOURS PRN
Qty: 30 TABLET | Refills: 0 | Status: SHIPPED | OUTPATIENT
Start: 2024-03-12 | End: 2024-03-19 | Stop reason: SDUPTHER

## 2024-03-12 RX ORDER — CELECOXIB 200 MG/1
200 CAPSULE ORAL 2 TIMES DAILY
Qty: 60 CAPSULE | Refills: 2 | Status: SHIPPED | OUTPATIENT
Start: 2024-03-12

## 2024-03-12 RX ORDER — OXYCODONE HYDROCHLORIDE 5 MG/1
10 TABLET ORAL EVERY 4 HOURS PRN
Status: DISCONTINUED | OUTPATIENT
Start: 2024-03-12 | End: 2024-03-12 | Stop reason: HOSPADM

## 2024-03-12 RX ORDER — DOCUSATE SODIUM 100 MG/1
100 CAPSULE, LIQUID FILLED ORAL EVERY 12 HOURS
Status: DISCONTINUED | OUTPATIENT
Start: 2024-03-12 | End: 2024-03-12 | Stop reason: HOSPADM

## 2024-03-12 RX ORDER — AMOXICILLIN 250 MG
1 CAPSULE ORAL 2 TIMES DAILY
Qty: 60 TABLET | Refills: 2 | Status: SHIPPED | OUTPATIENT
Start: 2024-03-12

## 2024-03-12 RX ORDER — ASPIRIN 81 MG/1
81 TABLET ORAL 2 TIMES DAILY
Qty: 60 TABLET | Refills: 0 | Status: SHIPPED | OUTPATIENT
Start: 2024-03-12 | End: 2024-04-11

## 2024-03-12 RX ORDER — ONDANSETRON 4 MG/1
8 TABLET, ORALLY DISINTEGRATING ORAL EVERY 8 HOURS PRN
Qty: 30 TABLET | Refills: 0 | Status: SHIPPED | OUTPATIENT
Start: 2024-03-12

## 2024-03-12 RX ADMIN — BISOPROLOL FUMARATE AND HYDROCHLOROTHIAZIDE 1 TABLET: 5; 6.25 TABLET, FILM COATED ORAL at 10:03

## 2024-03-12 RX ADMIN — OXYCODONE HYDROCHLORIDE 10 MG: 5 TABLET ORAL at 12:03

## 2024-03-12 RX ADMIN — ACETAMINOPHEN 1000 MG: 500 TABLET ORAL at 06:03

## 2024-03-12 RX ADMIN — LEVOTHYROXINE SODIUM 88 MCG: 0.09 TABLET ORAL at 06:03

## 2024-03-12 RX ADMIN — OXYCODONE HYDROCHLORIDE 5 MG: 5 TABLET ORAL at 04:03

## 2024-03-12 RX ADMIN — PREGABALIN 75 MG: 75 CAPSULE ORAL at 10:03

## 2024-03-12 RX ADMIN — DOCUSATE SODIUM 100 MG: 100 CAPSULE, LIQUID FILLED ORAL at 10:03

## 2024-03-12 RX ADMIN — ASPIRIN 325 MG ORAL TABLET 325 MG: 325 PILL ORAL at 10:03

## 2024-03-12 RX ADMIN — ALLOPURINOL 300 MG: 300 TABLET ORAL at 10:03

## 2024-03-12 RX ADMIN — ACETAMINOPHEN 1000 MG: 500 TABLET ORAL at 02:03

## 2024-03-12 RX ADMIN — CELECOXIB 200 MG: 100 CAPSULE ORAL at 10:03

## 2024-03-12 RX ADMIN — CEFAZOLIN 2 G: 2 INJECTION, POWDER, FOR SOLUTION INTRAMUSCULAR; INTRAVENOUS at 06:03

## 2024-03-12 RX ADMIN — MUPIROCIN 1 G: 20 OINTMENT TOPICAL at 10:03

## 2024-03-12 RX ADMIN — FAMOTIDINE 20 MG: 20 TABLET, FILM COATED ORAL at 10:03

## 2024-03-12 NOTE — DISCHARGE SUMMARY
[facility]  Discharge Note  Short Stay    Procedure(s) (LRB):  ROBOTIC ARTHROPLASTY, KNEE, TOTAL (Right)      OUTCOME: Patient tolerated treatment/procedure well without complication and is now ready for discharge.  Angela Landaverde was admitted following the aforementioned surgical procedure.  she received perioperative antibiotics over 23 hours following the surgery and participated in post operative physical therapy.  she was made familiar with the post-operative rehabilitation process and the need for DVT prophylaxis was discussed prior to discharged.  Angela Landaverde was discharged safely having suffered no untoward events.     DISPOSITION: Home or Self Care    FINAL DIAGNOSIS:  Osteoarthritis of right knee    FOLLOWUP: In clinic    DISCHARGE INSTRUCTIONS:    Discharge Procedure Orders   Ambulatory referral/consult to Home Health   Standing Status: Future   Referral Priority: Routine Referral Type: Home Health Care   Referral Reason: Specialty Services Required   Requested Specialty: Home Health Services   Number of Visits Requested: 1     Diet general     Remove dressing in 48 hours     Change dressing (specify)   Order Comments: Dressing change:  On POD 3- recommend dressing change via Home Health     Call MD for:  temperature >100.4     Call MD for:  persistent nausea and vomiting     Call MD for:  severe uncontrolled pain     Call MD for:  difficulty breathing, headache or visual disturbances     Call MD for:  redness, tenderness, or signs of infection (pain, swelling, redness, odor or green/yellow discharge around incision site)     Call MD for:  hives     Call MD for:  persistent dizziness or light-headedness     Call MD for:  extreme fatigue         Clinical Reference Documents Added to Patient Instructions         Document    ASPIRIN, ADULT (ENGLISH)    BRADYCARDIA (ENGLISH)    CELECOXIB, ADULT (ENGLISH)    DOCUSATE AND SENNA, ADULT (ENGLISH)    GOING HOME ON BLOOD THINNERS  (ENGLISH)     HEALTH RISKS OF OBESITY (ENGLISH)    HIGH BLOOD PRESSURE IN ADULTS (ENGLISH)    HIGH TRIGLYCERIDES (ENGLISH)    HOW TO PUT ON AND TAKE OFF COMPRESSION STOCKINGS (ENGLISH)    HOW TO USE A WALKER (ENGLISH)    HOW TO USE AN INCENTIVE SPIROMETER (ENGLISH)    HYPOTHYROIDISM (UNDERACTIVE THYROID) (ENGLISH)    ONDANSETRON, ADULT (ENGLISH)    OSTEOARTHRITIS DISCHARGE INSTRUCTIONS (ENGLISH)    OXYCODONE AND ACETAMINOPHEN, ADULT (ENGLISH)    TOTAL KNEE REPLACEMENT DISCHARGE INSTRUCTIONS  (ENGLISH)            TIME SPENT ON DISCHARGE: 9 minutes

## 2024-03-12 NOTE — PLAN OF CARE
[facility]  Discharge Final Note    Primary Care Provider: Joana Parmar DO    Expected Discharge Date: 3/12/2024    Final Discharge Note (most recent)       Final Note - 03/12/24 1552          Final Note    Assessment Type Final Discharge Note     Anticipated Discharge Disposition Home-Health Care Svc        Post-Acute Status    Post-Acute Authorization Home Health;HME     HME Status Set-up Complete/Auth obtained     Home Health Status Set-up Complete/Auth obtained     Patient choice form signed by patient/caregiver List with quality metrics by geographic area provided;List from CMS Compare;List from System Post-Acute Care                     Important Message from Medicare              Follow-up providers       Ernestina Ramirez FNP   Specialty: Family Medicine, Emergency Medicine, Orthopedic Surgery    [address] [place] [address]   Phone: [phone]       Next Steps: Follow up    Instructions: Please follow up on March 25 at 1:00              After-discharge care                Durable Medical Equipment       [facility]   Service: Durable Medical Equipment    [address] [place] [address]   Phone: [phone]                 [facility] Medical Care       [facility]   Service: [facility] Health Services    [address] [facility] [address]   Phone: [phone]                             Home with home health for therapy. Obtained rolling walker for patient.

## 2024-03-12 NOTE — PLAN OF CARE
Per communication from Dr Pemberton patient will dc home today. Spoke with Coleen at [facility]. Rolling walker will be delivered today. Insurance will not pay for bsc or tub transfer bench. Will notify patient. [facility] They have referral.

## 2024-03-12 NOTE — PT/OT/SLP PROGRESS
"Physical Therapy Treatment    Patient Name:  Angela Landaverde   MRN:  [id number]    Recommendations:     Discharge Recommendations: Low Intensity Therapy  Discharge Equipment Recommendations: walker, rolling  Barriers to discharge: None    Assessment:     Angela Landaverde is a 63 y.o. female admitted with a medical diagnosis of Osteoarthritis of right knee.  She presents with the following impairments/functional limitations: weakness, impaired endurance, impaired self care skills, impaired functional mobility, gait instability, impaired balance, decreased lower extremity function, pain, decreased ROM, orthopedic precautions (morbid obesity) .    Patient participated well with all PT interventions and demonstrated safe mobility for d/c home today. Patient will have home health PT follow up and assist from daughter PRN.    Rehab Prognosis: Good; patient would benefit from acute skilled PT services to address these deficits and reach maximum level of function.    Recent Surgery: Procedure(s) (LRB):  ROBOTIC ARTHROPLASTY, KNEE, TOTAL (Right) 1 Day Post-Op    Plan:     During this hospitalization, patient to be seen BID (5x/week; daily 2x/week) to address the identified rehab impairments via gait training, therapeutic activities, therapeutic exercises and progress toward the following goals:    Plan of Care Expires:  04/11/24    Subjective     Chief Complaint: Osteoarthritis of right knee   Patient/Family Comments/goals: "I did pretty well last night going to the bathroom"  Pain/Comfort:  Pain Rating 1: 7/10  Location - Side 1: Right  Location 1: knee  Pain Addressed 1: Pre-medicate for activity, Reposition, Distraction, Cessation of Activity  Pain Rating Post-Intervention 1: 7/10      Objective:     Communicated with Julia Bob LPN prior to session.  Patient found supine with knee immobilizer, peripheral IV, telemetry, wound vac upon PT entry to room.     General Precautions: Standard, fall  Orthopedic Precautions: RLE weight " bearing as tolerated  Braces: Knee immobilizer  Respiratory Status: Room air     Functional Mobility:  Bed Mobility:     Supine to Sit: stand by assistance  Transfers:     Sit to Stand:  stand by assistance with rolling walker  Bed to Chair: stand by assistance with  rolling walker  using  Step Transfer  Gait: 40', 100' using RW, step to pattern, slow saeid, short step lengths, verbal cues for sequencing and increasing right knee extension in stance pahse  Stairs:  Pt ascended/descended 4 stair(s) with Rolling Walker with right handrail with Contact Guard Assistance and verbal cues for sequencing. Patient preferred to keep 2 hands on the rail and descend stepping sideways to increase feeling of safety/security.       AM-PAC 6 CLICK MOBILITY  Turning over in bed (including adjusting bedclothes, sheets and blankets)?: 4  Sitting down on and standing up from a chair with arms (e.g., wheelchair, bedside commode, etc.): 4  Moving from lying on back to sitting on the side of the bed?: 4  Moving to and from a bed to a chair (including a wheelchair)?: 4  Need to walk in hospital room?: 3  Climbing 3-5 steps with a railing?: 3  Basic Mobility Total Score: 22       Treatment & Education:    Right lower extremity exercise x 30 reps: ankle pumps, Quad sets, glut sets, long arc quads, heel slides, hip abduction/adduction, and straight leg raises with active assist ROM, verbal cues, and tactile cues     Seated knee flexion stretch with prolonged hold    Right knee ROM: 5-83    Gait and stairs per above     Patient left up in chair with all lines intact, call button in reach, Julia Bob LPN notified, and daughter present..    GOALS:   Multidisciplinary Problems       Physical Therapy Goals          Problem: Physical Therapy    Goal Priority Disciplines Outcome Goal Variances Interventions   Physical Therapy Goal     PT, PT/OT Ongoing, Progressing     Description: Short Term Goals to be met by: 3/25/2024    Patient will  increase functional independence with mobility by performin. Supine to sit with Modified Caroleen  2. Sit to stand transfer with Modified Caroleen  3. Bed to chair transfer with Modified Caroleen using Rolling Walker, WBAT right LE  4. Gait  x 100 feet with Modified Caroleen using Rolling Walker, WBAT right LE.   5. Lower extremity exercise program x30 reps per handout, with assistance as needed  6. Knee ROM 0-90    Long Term Goals to be met by: 2024    Pt will regain full independent functional mobility to return to prior activities of daily living.                        Time Tracking:     PT Received On: 24  PT Start Time: 740     PT Stop Time: 08  PT Total Time (min): 25 min     Billable Minutes: Gait Training 15 minutes and Therapeutic Exercise 10 minutes    Treatment Type: Treatment  PT/PTA: PT     Number of PTA visits since last PT visit: 0     2024

## 2024-03-14 ENCOUNTER — TELEPHONE (OUTPATIENT)
Dept: ORTHOPEDICS | Facility: CLINIC | Age: 64
End: 2024-03-14
Payer: COMMERCIAL

## 2024-03-14 LAB
OHS QRS DURATION: 88 MS
OHS QTC CALCULATION: 436 MS

## 2024-03-14 NOTE — TELEPHONE ENCOUNTER
----- Message from Anh Dunaway sent at 3/14/2024  2:26 PM CDT -----  Regarding: WOUND CARE  RECEIVED A CALL FROM SERAFIN AT [facility] SAYING THEY NEED WOUND CARE INSTRUCTIONS ON THE PATIENT. CALL BACK NUMBER IS [phone].

## 2024-03-15 NOTE — TELEPHONE ENCOUNTER
----- Message from Lili Smalls sent at 3/15/2024 11:46 AM CDT -----  Pt calling to speak with nurse regarding message that was sent on 3/14 from her home health nurse - pt call back  # [phone]

## 2024-03-18 ENCOUNTER — TELEPHONE (OUTPATIENT)
Dept: ORTHOPEDICS | Facility: CLINIC | Age: 64
End: 2024-03-18
Payer: COMMERCIAL

## 2024-03-18 NOTE — TELEPHONE ENCOUNTER
----- Message from Dominga Heredia sent at 3/18/2024  3:11 PM CDT -----  Shari with [facility] called stating that she will be out of pain med before she comes back for appt. Please send to [facility]. Please call patient when called in @ [phone]

## 2024-03-18 NOTE — TELEPHONE ENCOUNTER
----- Message from Smiley Vallecillo sent at 3/18/2024 10:41 AM CDT -----  Regarding: Compression sock  Patient want to know do she need to put compression sock over her knee  she had surgery on. Please call her @ [phone]

## 2024-03-19 RX ORDER — OXYCODONE AND ACETAMINOPHEN 10; 325 MG/1; MG/1
1 TABLET ORAL EVERY 6 HOURS PRN
Qty: 30 TABLET | Refills: 0 | Status: SHIPPED | OUTPATIENT
Start: 2024-03-19 | End: 2024-03-28 | Stop reason: SDUPTHER

## 2024-03-25 ENCOUNTER — OFFICE VISIT (OUTPATIENT)
Dept: ORTHOPEDICS | Facility: CLINIC | Age: 64
End: 2024-03-25
Payer: COMMERCIAL

## 2024-03-25 ENCOUNTER — TELEPHONE (OUTPATIENT)
Dept: ORTHOPEDICS | Facility: CLINIC | Age: 64
End: 2024-03-25
Payer: COMMERCIAL

## 2024-03-25 DIAGNOSIS — Z96.651 STATUS POST TOTAL RIGHT KNEE REPLACEMENT: Primary | ICD-10-CM

## 2024-03-25 PROCEDURE — 99212 OFFICE O/P EST SF 10 MIN: CPT | Mod: PBBFAC | Performed by: NURSE PRACTITIONER

## 2024-03-25 PROCEDURE — 99024 POSTOP FOLLOW-UP VISIT: CPT | Mod: ,,, | Performed by: NURSE PRACTITIONER

## 2024-03-25 RX ORDER — SULFAMETHOXAZOLE AND TRIMETHOPRIM 800; 160 MG/1; MG/1
1 TABLET ORAL 2 TIMES DAILY
Qty: 20 TABLET | Refills: 0 | Status: SHIPPED | OUTPATIENT
Start: 2024-03-25 | End: 2024-04-02 | Stop reason: SDUPTHER

## 2024-03-25 NOTE — TELEPHONE ENCOUNTER
PAPERWORK COMPLETE; PATIENT PICKED UP AT HER APPOINTMENT WITH JOANNE EREVES    ----- Message from Saige Lopez sent at 3/25/2024  9:32 AM CDT -----  Regarding: PAPERWORK  PATIENT CALLING CHECKING ON HER SHORT TERM DISABILITY PAPERWORK, CALL BACK NUMBER [phone]

## 2024-03-25 NOTE — TELEPHONE ENCOUNTER
MEDICATION WAS SENT TO THE PHARMACY    ----- Message from Vanna Bassett sent at 3/25/2024  2:43 PM CDT -----  Pt was just seen and she is at pharmacy for medicine.

## 2024-03-25 NOTE — PROGRESS NOTES
Post-Operative Total Knee        No surgery found No surgery found      Pt is here today for First post-operative followup of her No surgery found.  she is doing well.  We have reviewed her findings and discussed plan of care and future treatment options, including the physical therapy plan.      Patient is 2 weeks postop right total knee arthroplasty, doing well.  She does have a good bit of swelling to the knee today.  Her incision looks good.  No drainage noted.  There is no drainage on her dressing.  Staples removed and Steri-Strips applied.  Possibly some very mild redness to the distal part of her incision.  We discussed elevate ice.  She has not wearing her ALEXIS hose.  I do want her back in those.  She is taking her Ecotrin as ordered.  She currently has home health PT.  Physical Exam:     The affected knee was inspected today.   The wound is healing well with no signs of erythema or warmth.  There is no drainage.  No clinical signs or symptoms of infection are present.   ROM:  3-90  -Daniela's sign.  2+ pulses are present.         Assessment and Plan  1. Status post total right knee replacement  - Ambulatory referral/consult to Physical/Occupational Therapy; Future            We will continue post-operative physical therapy until the patient feels that they are independent with a home rehab program.  Return to work status/ return to activities status was discussed today in detail. All questions were answered.    The use of stockings and DVT prophylaxis was discussed.  Will follow up in additional 4weeks with radiographs at that time.   We will start home Bactrim ds 1 p.o. b.i.d..  Continue aspirin.  ALEXIS hose.  Ice and elevate.  Call if any change in symptoms.  We will recheck her wound in 1 week.  There are no Patient Instructions on file for this visit.

## 2024-03-28 ENCOUNTER — TELEPHONE (OUTPATIENT)
Dept: ORTHOPEDICS | Facility: CLINIC | Age: 64
End: 2024-03-28
Payer: COMMERCIAL

## 2024-03-28 RX ORDER — OXYCODONE AND ACETAMINOPHEN 10; 325 MG/1; MG/1
1 TABLET ORAL EVERY 6 HOURS PRN
Qty: 30 TABLET | Refills: 0 | Status: SHIPPED | OUTPATIENT
Start: 2024-03-28 | End: 2024-04-11 | Stop reason: SDUPTHER

## 2024-03-28 NOTE — TELEPHONE ENCOUNTER
----- Message from Smiley Vallecillo sent at 3/28/2024  8:37 AM CDT -----  Regarding: refill  Shari Seals with [facility] is calling for  patient ,needs a refill on Oxycodone 10 /325 mg called into [facility] at [phone].  Please call patient at [phone] if you have any questions. Thanks!

## 2024-04-02 ENCOUNTER — OFFICE VISIT (OUTPATIENT)
Dept: ORTHOPEDICS | Facility: CLINIC | Age: 64
End: 2024-04-02
Payer: COMMERCIAL

## 2024-04-02 DIAGNOSIS — Z96.651 STATUS POST TOTAL RIGHT KNEE REPLACEMENT: Primary | ICD-10-CM

## 2024-04-02 PROCEDURE — 99213 OFFICE O/P EST LOW 20 MIN: CPT | Mod: PBBFAC | Performed by: NURSE PRACTITIONER

## 2024-04-02 PROCEDURE — 1159F MED LIST DOCD IN RCRD: CPT | Mod: CPTII,,, | Performed by: NURSE PRACTITIONER

## 2024-04-02 PROCEDURE — 99024 POSTOP FOLLOW-UP VISIT: CPT | Mod: ,,, | Performed by: NURSE PRACTITIONER

## 2024-04-02 RX ORDER — SULFAMETHOXAZOLE AND TRIMETHOPRIM 800; 160 MG/1; MG/1
1 TABLET ORAL 2 TIMES DAILY
Qty: 14 TABLET | Refills: 0 | Status: SHIPPED | OUTPATIENT
Start: 2024-04-02

## 2024-04-02 NOTE — PROGRESS NOTES
HISTORY OF PRESENT ILLNESS:       No surgery found No surgery found      Pt is here today for First post-operative followup of her No surgery found.  she is doing well.  We have reviewed her findings and discussed plan of care and future treatment options, including the physical therapy plan.      Pateint is 3 weeks post op right total knee arthroplasty, she is here for wound check today.  She had a good bit of swelling and mild redness to her knee 1 week ago.  The knee is looking better today.  Still swelling.  Her incision looks good.  There is 1 small area of drainage to her distal incision, serous drainage.  She is currently taking Bactrim ds 1 p.o. b.i.d..                                                                               PHYSICAL EXAMINATION:     I  Minimal effusion  2+ DP pulse                                                                                   ASSESSMENT:                                                                                                                                               1. Status post above, doing well.                                                                                                                               PLAN:       Wounds were treated today  DVT prophylaxis discussed  Therapy plan discussed in great detail today; all questions answered.                                                                            We will continue her Bactrim for an additional week.  We did discuss ice and elevation.  Drainage site covered with Band-Aid.  We discussed wound care.  Call our office if anything worsens.  We will have her rechecked by Dr. Pemberton in 1 week.                                                                   There are no Patient Instructions on file for this visit.

## 2024-04-03 ENCOUNTER — OFFICE VISIT (OUTPATIENT)
Dept: FAMILY MEDICINE | Facility: CLINIC | Age: 64
End: 2024-04-03
Payer: COMMERCIAL

## 2024-04-03 VITALS
HEART RATE: 80 BPM | OXYGEN SATURATION: 99 % | WEIGHT: 275 LBS | SYSTOLIC BLOOD PRESSURE: 134 MMHG | DIASTOLIC BLOOD PRESSURE: 74 MMHG | BODY MASS INDEX: 43.16 KG/M2 | HEIGHT: 67 IN

## 2024-04-03 DIAGNOSIS — M10.9 GOUT, UNSPECIFIED CAUSE, UNSPECIFIED CHRONICITY, UNSPECIFIED SITE: ICD-10-CM

## 2024-04-03 DIAGNOSIS — G89.29 OTHER CHRONIC PAIN: ICD-10-CM

## 2024-04-03 DIAGNOSIS — E03.9 HYPOTHYROIDISM, UNSPECIFIED TYPE: ICD-10-CM

## 2024-04-03 DIAGNOSIS — M19.90 ARTHRITIS: ICD-10-CM

## 2024-04-03 DIAGNOSIS — I10 ESSENTIAL HYPERTENSION: Primary | ICD-10-CM

## 2024-04-03 DIAGNOSIS — E55.9 VITAMIN D DEFICIENCY: ICD-10-CM

## 2024-04-03 PROCEDURE — G2211 COMPLEX E/M VISIT ADD ON: HCPCS | Mod: ,,, | Performed by: FAMILY MEDICINE

## 2024-04-03 PROCEDURE — 3078F DIAST BP <80 MM HG: CPT | Mod: CPTII,,, | Performed by: FAMILY MEDICINE

## 2024-04-03 PROCEDURE — 99214 OFFICE O/P EST MOD 30 MIN: CPT | Mod: ,,, | Performed by: FAMILY MEDICINE

## 2024-04-03 PROCEDURE — 3075F SYST BP GE 130 - 139MM HG: CPT | Mod: CPTII,,, | Performed by: FAMILY MEDICINE

## 2024-04-03 PROCEDURE — 3008F BODY MASS INDEX DOCD: CPT | Mod: CPTII,,, | Performed by: FAMILY MEDICINE

## 2024-04-03 RX ORDER — LEVOTHYROXINE SODIUM 88 UG/1
88 TABLET ORAL
Qty: 30 TABLET | Refills: 0 | Status: SHIPPED | OUTPATIENT
Start: 2024-04-03 | End: 2024-04-09 | Stop reason: SDUPTHER

## 2024-04-03 RX ORDER — ATENOLOL AND CHLORTHALIDONE TABLET 50; 25 MG/1; MG/1
1 TABLET ORAL DAILY
Qty: 90 TABLET | Refills: 1 | Status: SHIPPED | OUTPATIENT
Start: 2024-04-03

## 2024-04-03 RX ORDER — METHOCARBAMOL 750 MG/1
TABLET, FILM COATED ORAL
Qty: 30 TABLET | Refills: 4 | Status: SHIPPED | OUTPATIENT
Start: 2024-04-03

## 2024-04-03 RX ORDER — GABAPENTIN 300 MG/1
300 CAPSULE ORAL DAILY PRN
Qty: 90 CAPSULE | Refills: 1 | Status: SHIPPED | OUTPATIENT
Start: 2024-04-03

## 2024-04-03 RX ORDER — DICLOFENAC SODIUM 10 MG/G
GEL TOPICAL
Qty: 50 G | Refills: 3 | Status: SHIPPED | OUTPATIENT
Start: 2024-04-03

## 2024-04-03 RX ORDER — ALLOPURINOL 100 MG/1
100 TABLET ORAL DAILY
Qty: 90 TABLET | Refills: 1 | Status: SHIPPED | OUTPATIENT
Start: 2024-04-03

## 2024-04-03 RX ORDER — ASPIRIN 325 MG
50000 TABLET, DELAYED RELEASE (ENTERIC COATED) ORAL WEEKLY
Qty: 12 CAPSULE | Refills: 1 | Status: SHIPPED | OUTPATIENT
Start: 2024-04-03

## 2024-04-04 ENCOUNTER — CLINICAL SUPPORT (OUTPATIENT)
Dept: REHABILITATION | Facility: HOSPITAL | Age: 64
End: 2024-04-04
Payer: COMMERCIAL

## 2024-04-04 DIAGNOSIS — M25.661 DECREASED ROM OF RIGHT KNEE: ICD-10-CM

## 2024-04-04 DIAGNOSIS — Z96.651 STATUS POST TOTAL RIGHT KNEE REPLACEMENT: Primary | ICD-10-CM

## 2024-04-04 DIAGNOSIS — R26.9 GAIT DISTURBANCE: ICD-10-CM

## 2024-04-04 DIAGNOSIS — M62.81 QUADRICEPS WEAKNESS: ICD-10-CM

## 2024-04-04 PROCEDURE — 97161 PT EVAL LOW COMPLEX 20 MIN: CPT

## 2024-04-04 PROCEDURE — 97110 THERAPEUTIC EXERCISES: CPT

## 2024-04-04 NOTE — PLAN OF CARE
[facility]   Physical Therapy Initial Evaluation      Name: Angela Landaverde  Clinic Number: [id number]    Therapy Diagnosis:   Encounter Diagnosis   Name Primary?    Status post total right knee replacement         Physician: Ernestina Ramirez FNP    Physician Orders: PT Eval and Treat   Medical Diagnosis from Referral: see above  Evaluation Date: 4/4/2024  Authorization Period Expiration: 4/4/2024 - evaluation only approved  Plan of Care Expiration: 5/31/2024    Date of Surgery: 3/11/2024  Visit # / Visits authorized: 1/ 1 - evaluation only approved   FOTO: 1/ 3    Precautions: Standard     Time In: 10:54 am  Time Out: 11:43 am  Total Billable Time: 49 minutes    Subjective     Date of onset: 3/11/2024    History of current condition - Angela reports: underwent right total knee replacement on 3/11/24 - has been getting home health until Mon 4/1 - pain is getting better but still aches a lot at night - normally takes a shower but right now just doing sponge baths - used to get down in the tub prior to her knee getting bad - is back driving - lives alone so she has had to figure out how to do for herself - able to do her laundry and light cooking    Falls: n/a    Imaging: xrays    Prior Therapy: home health until Mon 4/1  Social History:  lives alone  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  Prior Level of Function: independent   Current Level of Function: driving, independent with BADL's, able to do some laundry and light cooking, has to take a sponge bath currently    Pain:  Current 6/10, worst 8/10, best 6/10   Location: right knee    Description: Aching and Throbbing  Aggravating Factors: Standing, Walking, Night Time, Flexing, and Getting out of bed/chair  Easing Factors: pain medication, ice, and elevation    Patients goals: get back to work - would like to be able to get in and out of the bathtub      Medical History:   Past Medical History:   Diagnosis Date    Arthritis     Hypertension     Thyroid disease        Surgical History:   Angela Landaverde  has a past surgical history that includes  section; Tubal ligation; and robotic arthroplasty, knee (Right, 3/11/2024).    Medications:   Angela has a current medication list which includes the following prescription(s): allopurinol, aspirin, atenolol-chlorthalidone, celecoxib, cholecalciferol (vitamin d3), diclofenac sodium, gabapentin, levothyroxine, methocarbamol, nystatin-triamcinolone, ondansetron, oxycodone-acetaminophen, senna-docusate 8.6-50 mg, and sulfamethoxazole-trimethoprim 800-160mg.    Allergies:   Review of patient's allergies indicates:  No Known Allergies     Objective      Incisions: has had some drainage - currently on Bactrim - bandaid covering drainage - site - not removed      Range of motion:  Motion Right Left    Knee extension   -26 degrees    -10 degrees    Knee flexion   87 degrees    90 degrees        Manual muscle test   Muscle Right  Left    Knee extension  MMT strength: 3+/5  MMT strength: 4+/5   Knee flexion  MMT strength: 3+/5  MMT strength: 4+/5       Gait:  Weight bearing precautions: WBAT  Assistive device: straight cane  Ambulation distance and deviations: marked lack of extension on right knee during stance phase - also has decreased extension on left  Stairs: 3 steps to enter home - has a handrail - goes up reciprocally and down one step at a time  Comments:      Clinical Special Tests:    Knee  - not performed secondary to postop    Comments:    Intake Outcome Measure for FOTO Knee Survey    Therapist reviewed FOTO scores for Angela Landaverde on 2024.   FOTO report - see Media section or FOTO account episode details.    Intake Score: 40         Clinical Information for Insurance Authorization     Dates of Services: 2024 to 2024  Discharge Plan: Patient will be discharged from skilled physical therapy treatment  once all goals have been met, patient has plateaued, or physician/insurance requests discontinuation of care. Patient will be discharged with a home exercise program.   Type of therapy: Rehabilitative  ICD-10 Diagnosis Code(s): Z96.651   Which side is symptomatic? Right  Surgical: Yes  Surgical procedure:  total knee replacement   Surgery date:  3/11/2024 .  Presenting symptoms/diagnoses are repetitive in nature.  Presenting symptoms are non-radiating in nature.   The rehabilitation is not related to a diagnosis of cancer.  The rehabilitation is not related to a diagnosis of lymphedema.  Patient's clinical presentation is:  Severe objective and functional deficits: consistent intense symptoms with severe loss of range of motion, strength, or ability to perform daily tasks  CPT Codes Requested:  80260 [therapeutic exercise], 65488 [neuromuscular re-education], 31991 [gait training], 80750 [manual therapy], 92496 [therapeutic activities], 00174 [unattended electrical stimulation], 34238 [biofeedback by any modality], and 81421 [vasopneumatic device]     Treatment     Total Treatment time (time-based codes) separate from Evaluation: 16 minutes     Angela received the treatments listed below:      Seated hamstring stretch, propped extension stretch, supine heel slides w/ foot taps, seated heel slides w/ foot on towel, long arc quad, and straight leg raise     Patient Education and Home Exercises     Education provided:   - evaluation results, plan of care and home exercise program     Written Home Exercises Provided: yes. Exercises were reviewed and Angela was able to demonstrate them prior to the end of the session.  Angela demonstrated good  understanding of the education provided. See EMR under Patient Instructions for exercises provided during therapy sessions.    Assessment     Angela is a 63 y.o. female referred to outpatient Physical Therapy with a medical diagnosis of s/p right total knee replacement . Patient presents  with typical postop symptoms of swelling, pain, decreased range of motion, quad weakness, and gait disturbance. She is currently off work. To return to work she will be required to do a lot of walking, lifting garbage, sweeping and mopping and carrying food trays or push food carts.     Patient prognosis is Good.   Patient will benefit from skilled outpatient Physical Therapy to address the deficits stated above and in the chart below, provide patient /family education, and to maximize patientt's level of independence.     Plan of care discussed with patient: Yes  Patient's spiritual, cultural and educational needs considered and patient is agreeable to the plan of care and goals as stated below:     Anticipated Barriers for therapy: none    Medical Necessity is demonstrated by the following  History  Co-morbidities and personal factors that may impact the plan of care [] LOW: no personal factors / co-morbidities  [x] MODERATE: 1-2 personal factors / co-morbidities  [] HIGH: 3+ personal factors / co-morbidities    Moderate / High Support Documentation:   Co-morbidities affecting plan of care: decreased range of motion, quad weakness, gait deviation    Personal Factors:   no deficits     Examination  Body Structures and Functions, activity limitations and participation restrictions that may impact the plan of care [x] LOW: addressing 1-2 elements  [] MODERATE: 3+ elements  [] HIGH: 4+ elements (please support below)    Moderate / High Support Documentation: n/a     Clinical Presentation [x] LOW: stable  [] MODERATE: Evolving  [] HIGH: Unstable     Decision Making/ Complexity Score: low         SHORT TERM GOALS - 4 weeks  1.  Patient to be independent with home exercise program to facilitate carryover between therapy visits.  2.  Patient will have  degrees range of motion right knee for improved mobiilty.  3.  Patient will perform straight leg raise without quad lag increasing from resting for increased quad  control.  4.  Patient will increase manual muscle test of right lower extremity to 4+ to 5/5 for increased stability with gait and activiites of daily living.  5.  Patient will be able to get in and out of the shower independently.    LONG TERM GOALS - 8 weeks  1.  Patient will go up/down stairs reciprocal pattern without handrails and good eccentric control on right lower extremity.  2.  Patient will ambulate independent without cane, without deviation and without pain.  3.  Patient will be able to get in and out of the bathtub independently to be able to take a tub bath.  4.  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     Plan     Plan of care Certification: 4/4/2024 to 5/31/2024.    Outpatient Physical Therapy 2 times weekly for 8 weeks to include the following interventions: Electrical Stimulation NMES, Gait Training, Manual Therapy, Moist Heat/ Ice, Neuromuscular Re-ed, Patient Education, Therapeutic Activities, Therapeutic Exercise, and Biofeedback and Vasopneumatic .     ANTONIO TRIANA PT        Physician's Signature: _________________________________________ Date: ________________

## 2024-04-08 ENCOUNTER — CLINICAL SUPPORT (OUTPATIENT)
Dept: REHABILITATION | Facility: HOSPITAL | Age: 64
End: 2024-04-08
Payer: COMMERCIAL

## 2024-04-08 DIAGNOSIS — M25.661 DECREASED ROM OF RIGHT KNEE: ICD-10-CM

## 2024-04-08 DIAGNOSIS — M62.81 QUADRICEPS WEAKNESS: ICD-10-CM

## 2024-04-08 DIAGNOSIS — Z96.651 STATUS POST TOTAL RIGHT KNEE REPLACEMENT: Primary | ICD-10-CM

## 2024-04-08 DIAGNOSIS — R26.9 GAIT DISTURBANCE: ICD-10-CM

## 2024-04-08 PROCEDURE — 97014 ELECTRIC STIMULATION THERAPY: CPT | Mod: CQ

## 2024-04-08 PROCEDURE — 97112 NEUROMUSCULAR REEDUCATION: CPT | Mod: CQ

## 2024-04-08 PROCEDURE — 90901 BIOFEEDBACK TRAIN ANY METH: CPT | Mod: CQ

## 2024-04-08 PROCEDURE — 97110 THERAPEUTIC EXERCISES: CPT | Mod: CQ

## 2024-04-08 NOTE — PROGRESS NOTES
[facility]   Physical Therapy Treatment Note      Name: Angela Landaverde  Clinic Number: [id number]    Therapy Diagnosis:   Encounter Diagnoses   Name Primary?    Status post total right knee replacement Yes    Decreased ROM of right knee     Quadriceps weakness     Gait disturbance      Physician: Ernestina Ramirez FNP    Visit Date: 4/8/2024    Physician Orders: PT Eval and Treat   Medical Diagnosis from Referral: see above  Evaluation Date: 4/4/2024  Authorization Period Expiration: 4/4/2024 - evaluation only approved  Plan of Care Expiration: 5/31/2024     Date of Surgery: 3/11/2024  Visit # / Visits authorized: 1/ 17    FOTO: 1/ 3     Precautions: Standard    PTA Visit #: 1/5     Time In: 1:47 pm  Time Out: 2:46 pm  Total Billable Time: 49 minutes    Subjective     Pt reports: she has been trying to stretch-it gets very tight behind knee.  She was compliant with home exercise program.  Response to previous treatment: no complaints   Functional change: no change noted    Pain: 5/10  Location: right knee      Objective      Objective Measures updated at progress report unless specified.   Case conference with Adelina Moseley, PT, for initial PTA visit.     -19 degrees extension on arrival, -13 at end of treatment.    Treatment     Angela received the treatments listed below:      therapeutic exercises to develop strength, endurance, ROM, flexibility, posture, and core stabilization for 18 minutes including:  NuStep x 5 minutes   Ideal stretch 10 x 10 second hold   Seated hamstring stretch  supine heel slides w/ foot taps   Discussed and stressed importance of keeping knee extended as much as possible, not allowing external rotation or crossing legs to rest in flexion     manual therapy techniques: Joint mobilizations, Manual traction, Myofacial release, Manual Lymphatic Drainage, Soft tissue Mobilization, and Friction Massage were applied to the: right leg for 3 minutes, including:  Scar  massage x 3 minutes     neuromuscular re-education activities to improve: Balance, Coordination, Kinesthetic Sense, Proprioception, and Posture for 15 minutes. The following activities were included:  Quad sets, straight leg raises, and short arc quads x 5 minutes each    therapeutic activities to improve functional performance for 0  minutes, including:  (not performed today)     gait training to improve functional mobility and safety for 0  minutes, including:  (not performed today)     supervised modalities after being cleared for contradictions: NMES Electrical Stimulation:  Angela received NMES Electrical Stimulation to elicit muscle contraction of the right quad. Pt received stimulation at a rate of 80 pps with symmetric current, ramp of 3 seconds with 10 second on time and 20 second off time. Patient tolerated treatment well without any adverse effects.     biofeedback to isolate quad contraction, decrease cocontraction, and assist with neuromuscular reeducation for 15 minutes. Exercises performed with biofeedback Including: short arc quads and straight leg raises x 20 each. First 5 minutes spent learning to isolate quads.      Patient Education and Home Exercises       Education provided:   - review of home exercise program and current Plan of Care/rationale of treatment.    Written Home Exercises Provided: Patient instructed to cont prior HEP. Exercises were reviewed and Angela was able to demonstrate them prior to the end of the session.  Angela demonstrated good understanding of the education provided. See EMR under Patient Instructions for exercises provided during therapy sessions    Assessment     At Evaluation:  Angela is a 63 y.o. female referred to outpatient Physical Therapy with a medical diagnosis of s/p right total knee replacement . Patient presents with typical postop symptoms of swelling, pain, decreased range of motion, quad weakness, and gait disturbance. She is currently off work. To return to  work she will be required to do a lot of walking, lifting garbage, sweeping and mopping and carrying food trays or push food carts.     Current Assessment:  Angela arrived for first visit after evaluation with -19 degrees extension. She had severe difficulty actually firing quads. She received neuromuscular electrical stimulation and then biofeedback to help achieve volitional quad contraction. By end of treatment she had -13 degrees extension resting. Discussed watching to be sure she was using quads with exercises at home as well as not using her hips and arms to straighten knee.  Will progress as tolerated.    Angela Is progressing towards her goals.   Pt prognosis is Good.     Pt will continue to benefit from skilled outpatient physical therapy to address the deficits listed in the problem list box on initial evaluation, provide pt/family education and to maximize pt's level of independence in the home and community environment.     Pt's spiritual, cultural and educational needs considered and pt agreeable to plan of care and goals.     Anticipated barriers to physical therapy: none    Goals:   SHORT TERM GOALS - 4 weeks  1.  Patient to be independent with home exercise program to facilitate carryover between therapy visits.  2.  Patient will have  degrees range of motion right knee for improved mobiilty.  3.  Patient will perform straight leg raise without quad lag increasing from resting for increased quad control.  4.  Patient will increase manual muscle test of right lower extremity to 4+ to 5/5 for increased stability with gait and activiites of daily living.  5.  Patient will be able to get in and out of the shower independently.     LONG TERM GOALS - 8 weeks  1.  Patient will go up/down stairs reciprocal pattern without handrails and good eccentric control on right lower extremity.  2.  Patient will ambulate independent without cane, without deviation and without pain.  3.  Patient will be able to get  in and out of the bathtub independently to be able to take a tub bath.  4.  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     Plan     Plan of care Certification: 4/4/2024 to 5/31/2024.     Outpatient Physical Therapy 2 times weekly for 8 weeks to include the following interventions: Electrical Stimulation NMES, Gait Training, Manual Therapy, Moist Heat/ Ice, Neuromuscular Re-ed, Patient Education, Therapeutic Activities, Therapeutic Exercise, and Biofeedback and Vasopneumatic .     Norma San, PTA   04/08/2024

## 2024-04-09 DIAGNOSIS — E03.9 HYPOTHYROIDISM, UNSPECIFIED TYPE: ICD-10-CM

## 2024-04-09 DIAGNOSIS — Z96.651 S/P TOTAL KNEE REPLACEMENT, RIGHT: Primary | ICD-10-CM

## 2024-04-09 RX ORDER — LEVOTHYROXINE SODIUM 88 UG/1
88 TABLET ORAL
Qty: 90 TABLET | Refills: 1 | Status: SHIPPED | OUTPATIENT
Start: 2024-04-09

## 2024-04-11 ENCOUNTER — OFFICE VISIT (OUTPATIENT)
Dept: ORTHOPEDICS | Facility: CLINIC | Age: 64
End: 2024-04-11
Payer: COMMERCIAL

## 2024-04-11 ENCOUNTER — CLINICAL SUPPORT (OUTPATIENT)
Dept: REHABILITATION | Facility: HOSPITAL | Age: 64
End: 2024-04-11
Payer: COMMERCIAL

## 2024-04-11 ENCOUNTER — HOSPITAL ENCOUNTER (OUTPATIENT)
Dept: RADIOLOGY | Facility: HOSPITAL | Age: 64
Discharge: HOME | End: 2024-04-11
Attending: ORTHOPAEDIC SURGERY
Payer: COMMERCIAL

## 2024-04-11 DIAGNOSIS — M25.661 DECREASED ROM OF RIGHT KNEE: ICD-10-CM

## 2024-04-11 DIAGNOSIS — Z96.651 STATUS POST TOTAL RIGHT KNEE REPLACEMENT: Primary | ICD-10-CM

## 2024-04-11 DIAGNOSIS — R26.9 GAIT DISTURBANCE: ICD-10-CM

## 2024-04-11 DIAGNOSIS — Z96.651 S/P TOTAL KNEE REPLACEMENT, RIGHT: ICD-10-CM

## 2024-04-11 DIAGNOSIS — M62.81 QUADRICEPS WEAKNESS: ICD-10-CM

## 2024-04-11 PROCEDURE — 97014 ELECTRIC STIMULATION THERAPY: CPT | Mod: CQ

## 2024-04-11 PROCEDURE — 99024 POSTOP FOLLOW-UP VISIT: CPT | Mod: ,,, | Performed by: ORTHOPAEDIC SURGERY

## 2024-04-11 PROCEDURE — 99212 OFFICE O/P EST SF 10 MIN: CPT | Mod: PBBFAC,25 | Performed by: ORTHOPAEDIC SURGERY

## 2024-04-11 PROCEDURE — 97110 THERAPEUTIC EXERCISES: CPT | Mod: CQ

## 2024-04-11 PROCEDURE — 97112 NEUROMUSCULAR REEDUCATION: CPT | Mod: CQ

## 2024-04-11 PROCEDURE — 1160F RVW MEDS BY RX/DR IN RCRD: CPT | Mod: CPTII,,, | Performed by: ORTHOPAEDIC SURGERY

## 2024-04-11 PROCEDURE — 73562 X-RAY EXAM OF KNEE 3: CPT | Mod: 26,RT,, | Performed by: ORTHOPAEDIC SURGERY

## 2024-04-11 PROCEDURE — 1159F MED LIST DOCD IN RCRD: CPT | Mod: CPTII,,, | Performed by: ORTHOPAEDIC SURGERY

## 2024-04-11 PROCEDURE — 90901 BIOFEEDBACK TRAIN ANY METH: CPT | Mod: CQ

## 2024-04-11 PROCEDURE — 73562 X-RAY EXAM OF KNEE 3: CPT | Mod: TC,RT

## 2024-04-11 RX ORDER — OXYCODONE AND ACETAMINOPHEN 10; 325 MG/1; MG/1
1 TABLET ORAL EVERY 6 HOURS PRN
Qty: 30 TABLET | Refills: 0 | Status: SHIPPED | OUTPATIENT
Start: 2024-04-11 | End: 2024-05-01

## 2024-04-11 NOTE — LETTER
April 11, 2024      [facility]  [address] [place] [id number]  Phone: [phone]  Fax: [phone]       Patient: Angela Landaverde   YOB: 1960  Date of Visit: 04/11/2024    To Whom It May Concern:    Alejandro Landaverde  was at [facility] on 04/11/2024. The patient may remain off work until re-evaluated in 6 weeks on 5-. If you have any questions or concerns, or if I can be of further assistance, please do not hesitate to contact me.    Sincerely,    MD Smiley Rousseau RN

## 2024-04-11 NOTE — PROGRESS NOTES
[facility]   Physical Therapy Treatment Note      Name: Angela Landaverde  Clinic Number: [id number]    Therapy Diagnosis:   Encounter Diagnoses   Name Primary?    Status post total right knee replacement Yes    Decreased ROM of right knee     Quadriceps weakness     Gait disturbance      Physician: Ernestina Ramirez FNP    Visit Date: 4/11/2024    Physician Orders: PT Eval and Treat   Medical Diagnosis from Referral: see above  Evaluation Date: 4/4/2024  Authorization Period Expiration: 6/30/2024   Plan of Care Expiration: 5/31/2024     Date of Surgery: 3/11/2024  Visit # / Visits authorized: 3/ 17    FOTO: 1/ 3     Precautions: Standard    PTA Visit #: 2/5     Time In: 1:05 pm  Time Out: 2:03 pm  Total Billable Time: 46 minutes    Subjective     Pt reports: she hurt for a while after last treatment.  She was compliant with home exercise program.  Response to previous treatment: no complaints   Functional change: no change noted    Pain: 5/10  Location: right knee      Objective      Objective Measures updated at progress report unless specified.     -15 degrees extension on arrival, -13 at end of treatment. 90 degrees flexion     Treatment     Angela received the treatments listed below:      therapeutic exercises to develop strength, endurance, ROM, flexibility, posture, and core stabilization for 18 minutes including:  NuStep x 5 minutes   Slant board 3 x 30 second hold   Ideal stretch 10 x 10 second hold   Seated hamstring stretch  supine heel slides w/ foot taps x 20    manual therapy techniques: Joint mobilizations, Manual traction, Myofacial release, Manual Lymphatic Drainage, Soft tissue Mobilization, and Friction Massage were applied to the: right leg for 0 minutes, including:  Scar massage x 0 minutes     neuromuscular re-education activities to improve: Balance, Coordination, Kinesthetic Sense, Proprioception, and Posture for 18 minutes. The following activities were  included:  Quad sets, straight leg raises, and short arc quads x 4 minutes each  Active quad sets x 6 minutes with verbal and tactile cues     therapeutic activities to improve functional performance for 0  minutes, including:  (not performed today)     gait training to improve functional mobility and safety for 0  minutes, including:  (not performed today)     supervised modalities after being cleared for contradictions: NMES Electrical Stimulation:  Angela received NMES Electrical Stimulation to elicit muscle contraction of the right quad. Pt received stimulation at a rate of 80 pps with symmetric current, ramp of 3 seconds with 10 second on time and 20 second off time. Patient tolerated treatment well without any adverse effects.     biofeedback to isolate quad contraction, decrease cocontraction, and assist with neuromuscular reeducation for 10 minutes. Exercises performed with biofeedback Including: short arc quads and straight leg raises x 20 each.       Patient Education and Home Exercises       Education provided:   - review of home exercise program and current Plan of Care/rationale of treatment.    Written Home Exercises Provided: Patient instructed to cont prior HEP. Exercises were reviewed and Angela was able to demonstrate them prior to the end of the session.  Angela demonstrated good understanding of the education provided. See EMR under Patient Instructions for exercises provided during therapy sessions    Assessment     At Evaluation:  Angela is a 63 y.o. female referred to outpatient Physical Therapy with a medical diagnosis of s/p right total knee replacement . Patient presents with typical postop symptoms of swelling, pain, decreased range of motion, quad weakness, and gait disturbance. She is currently off work. To return to work she will be required to do a lot of walking, lifting garbage, sweeping and mopping and carrying food trays or push food carts.     Current Assessment:  Angela arrived with -15  degrees extension. She was unable to perform volitional quad sets. She received biofeedback to assist with active contraction. By end of treatment she was able to do volitional quad set again. Will progress as tolerated.    Angela Is progressing towards her goals.   Pt prognosis is Good.     Pt will continue to benefit from skilled outpatient physical therapy to address the deficits listed in the problem list box on initial evaluation, provide pt/family education and to maximize pt's level of independence in the home and community environment.     Pt's spiritual, cultural and educational needs considered and pt agreeable to plan of care and goals.     Anticipated barriers to physical therapy: none    Goals:   SHORT TERM GOALS - 4 weeks  1.  Patient to be independent with home exercise program to facilitate carryover between therapy visits.  2.  Patient will have  degrees range of motion right knee for improved mobiilty.  3.  Patient will perform straight leg raise without quad lag increasing from resting for increased quad control.  4.  Patient will increase manual muscle test of right lower extremity to 4+ to 5/5 for increased stability with gait and activiites of daily living.  5.  Patient will be able to get in and out of the shower independently.     LONG TERM GOALS - 8 weeks  1.  Patient will go up/down stairs reciprocal pattern without handrails and good eccentric control on right lower extremity.  2.  Patient will ambulate independent without cane, without deviation and without pain.  3.  Patient will be able to get in and out of the bathtub independently to be able to take a tub bath.  4.  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     Plan     Plan of care Certification: 4/4/2024 to 5/31/2024.     Outpatient Physical Therapy 2 times weekly for 8 weeks to include the following interventions: Electrical Stimulation NMES, Gait Training, Manual Therapy, Moist Heat/ Ice,  Neuromuscular Re-ed, Patient Education, Therapeutic Activities, Therapeutic Exercise, and Biofeedback and Vasopneumatic .     Norma San, PTA   04/11/2024

## 2024-04-15 ENCOUNTER — TELEPHONE (OUTPATIENT)
Dept: ORTHOPEDICS | Facility: CLINIC | Age: 64
End: 2024-04-15
Payer: COMMERCIAL

## 2024-04-15 ENCOUNTER — HOSPITAL ENCOUNTER (OUTPATIENT)
Dept: RADIOLOGY | Facility: HOSPITAL | Age: 64
Discharge: HOME | End: 2024-04-15
Attending: ORTHOPAEDIC SURGERY
Payer: COMMERCIAL

## 2024-04-15 ENCOUNTER — CLINICAL SUPPORT (OUTPATIENT)
Dept: REHABILITATION | Facility: HOSPITAL | Age: 64
End: 2024-04-15
Payer: COMMERCIAL

## 2024-04-15 DIAGNOSIS — Z96.651 STATUS POST TOTAL RIGHT KNEE REPLACEMENT: Primary | ICD-10-CM

## 2024-04-15 DIAGNOSIS — M25.661 DECREASED ROM OF RIGHT KNEE: ICD-10-CM

## 2024-04-15 DIAGNOSIS — Z96.651 STATUS POST TOTAL RIGHT KNEE REPLACEMENT: ICD-10-CM

## 2024-04-15 DIAGNOSIS — M62.81 QUADRICEPS WEAKNESS: ICD-10-CM

## 2024-04-15 DIAGNOSIS — R26.9 GAIT DISTURBANCE: ICD-10-CM

## 2024-04-15 PROCEDURE — 97112 NEUROMUSCULAR REEDUCATION: CPT | Mod: CQ

## 2024-04-15 PROCEDURE — 97140 MANUAL THERAPY 1/> REGIONS: CPT | Mod: CQ

## 2024-04-15 PROCEDURE — 93971 EXTREMITY STUDY: CPT | Mod: 26,RT,, | Performed by: RADIOLOGY

## 2024-04-15 PROCEDURE — 93971 EXTREMITY STUDY: CPT | Mod: TC,RT

## 2024-04-15 NOTE — PROGRESS NOTES
[facility]   Physical Therapy Treatment Note      Name: Angela Landaverde  Clinic Number: [id number]    Therapy Diagnosis:   No diagnosis found.    Physician: Ernestina Ramirez FNP    Visit Date: 4/15/2024    Physician Orders: PT Eval and Treat   Medical Diagnosis from Referral: see above  Evaluation Date: 4/4/2024  Authorization Period Expiration: 6/30/2024   Plan of Care Expiration: 5/31/2024     Date of Surgery: 3/11/2024  Visit # / Visits authorized: 4/ 17    FOTO: 1/ 3     Precautions: Standard    PTA Visit #: 3/5     Time In: 1:45 pm  Time Out: 2:03 pm  Total Billable Time: 15 minutes    Subjective     Pt reports: she has been feeling nauseated the last few days. Her calf has been hurting and tender to the touch.   She was compliant with home exercise program.  Response to previous treatment: no complaints   Functional change: no change noted    Pain: 5/10  Location: right knee      Objective      Objective Measures updated at progress report unless specified.     -15 degrees extension on arrival, -10 at end of treatment. 90 degrees flexion     Treatment     Angela received the treatments listed below:      therapeutic exercises to develop strength, endurance, ROM, flexibility, posture, and core stabilization for 0 minutes including:  NuStep    Slant board 3 x 30 second hold   Ideal stretch 10 x 10 second hold   Seated hamstring stretch  supine heel slides w/ foot taps x 20    manual therapy techniques: Joint mobilizations, Manual traction, Myofacial release, Manual Lymphatic Drainage, Soft tissue Mobilization, and Friction Massage were applied to the: right leg for 15 minutes, including:  Scar massage x 8 minutes, gentle overpressure into extension, assessment for possible DVT      neuromuscular re-education activities to improve: Balance, Coordination, Kinesthetic Sense, Proprioception, and Posture for 8 minutes. The following activities were included:  Active quad sets x 6 minutes  with verbal and tactile cues     (not performed today)   straight leg raises, and short arc quads x 4 minutes each    therapeutic activities to improve functional performance for 0  minutes, including:  (not performed today)     gait training to improve functional mobility and safety for 0  minutes, including:  (not performed today)     supervised modalities after being cleared for contradictions: NMES Electrical Stimulation:  Angela received NMES Electrical Stimulation to elicit muscle contraction of the right quad. Pt received stimulation at a rate of 80 pps with symmetric current, ramp of 3 seconds with 10 second on time and 20 second off time. Patient tolerated treatment well without any adverse effects.     biofeedback to isolate quad contraction, decrease cocontraction, and assist with neuromuscular reeducation for 0 minutes. Exercises performed with biofeedback Including: short arc quads and straight leg raises x 20 each.       Patient Education and Home Exercises       Education provided:   - review of home exercise program and current Plan of Care/rationale of treatment.    Written Home Exercises Provided: Patient instructed to cont prior HEP. Exercises were reviewed and Angela was able to demonstrate them prior to the end of the session.  Angela demonstrated good understanding of the education provided. See EMR under Patient Instructions for exercises provided during therapy sessions    Assessment     At Evaluation:  Angela is a 63 y.o. female referred to outpatient Physical Therapy with a medical diagnosis of s/p right total knee replacement . Patient presents with typical postop symptoms of swelling, pain, decreased range of motion, quad weakness, and gait disturbance. She is currently off work. To return to work she will be required to do a lot of walking, lifting garbage, sweeping and mopping and carrying food trays or push food carts.     Current Assessment:  Angela continues to arrive at -15 degrees extension.  She is able to reach -10 degrees just sitting on table with leg straight in front of her with gentle overpressure into extension. She reported having nausea for several days now. She complained of pain in calf today that makes touch painful. A slight warmth was noted in the area. Supervising PT was called over for assessment and it was agreed that it would be best to contact her surgeon. Messages were sent to Dr Pemberton and Ernestina Ramirez for medical opinions on treatment vs. Doppler. Both were unavailable at the time so called and spoke with . Mery called back and then Dr. Pemberton messaged that he would like for her to have a Doppler to rule out DVT. Patient was sent to imaging clinic.    Angela Is progressing towards her goals.   Pt prognosis is Good.     Pt will continue to benefit from skilled outpatient physical therapy to address the deficits listed in the problem list box on initial evaluation, provide pt/family education and to maximize pt's level of independence in the home and community environment.     Pt's spiritual, cultural and educational needs considered and pt agreeable to plan of care and goals.     Anticipated barriers to physical therapy: none    Goals:   SHORT TERM GOALS - 4 weeks  1.  Patient to be independent with home exercise program to facilitate carryover between therapy visits.  2.  Patient will have  degrees range of motion right knee for improved mobiilty.  3.  Patient will perform straight leg raise without quad lag increasing from resting for increased quad control.  4.  Patient will increase manual muscle test of right lower extremity to 4+ to 5/5 for increased stability with gait and activiites of daily living.  5.  Patient will be able to get in and out of the shower independently.     LONG TERM GOALS - 8 weeks  1.  Patient will go up/down stairs reciprocal pattern without handrails and good eccentric control on right lower extremity.  2.  Patient will ambulate independent  without cane, without deviation and without pain.  3.  Patient will be able to get in and out of the bathtub independently to be able to take a tub bath.  4.  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     Plan     Plan of care Certification: 4/4/2024 to 5/31/2024.     Outpatient Physical Therapy 2 times weekly for 8 weeks to include the following interventions: Electrical Stimulation NMES, Gait Training, Manual Therapy, Moist Heat/ Ice, Neuromuscular Re-ed, Patient Education, Therapeutic Activities, Therapeutic Exercise, and Biofeedback and Vasopneumatic .     Norma San, PTA   04/15/2024

## 2024-04-15 NOTE — TELEPHONE ENCOUNTER
----- Message from Dominga Heredia sent at 4/15/2024  2:38 PM CDT -----  Caryn with [facility] called stating that patient has pain in her calf and is warm to the touch. Please call [facility] ext # [phone]

## 2024-04-17 ENCOUNTER — TELEPHONE (OUTPATIENT)
Dept: FAMILY MEDICINE | Facility: CLINIC | Age: 64
End: 2024-04-17
Payer: COMMERCIAL

## 2024-04-17 ENCOUNTER — CLINICAL SUPPORT (OUTPATIENT)
Dept: REHABILITATION | Facility: HOSPITAL | Age: 64
End: 2024-04-17
Payer: COMMERCIAL

## 2024-04-17 DIAGNOSIS — M25.661 DECREASED ROM OF RIGHT KNEE: ICD-10-CM

## 2024-04-17 DIAGNOSIS — M62.81 QUADRICEPS WEAKNESS: ICD-10-CM

## 2024-04-17 DIAGNOSIS — R26.9 GAIT DISTURBANCE: ICD-10-CM

## 2024-04-17 DIAGNOSIS — Z96.651 STATUS POST TOTAL RIGHT KNEE REPLACEMENT: Primary | ICD-10-CM

## 2024-04-17 PROCEDURE — 97112 NEUROMUSCULAR REEDUCATION: CPT | Mod: CQ

## 2024-04-17 PROCEDURE — 97014 ELECTRIC STIMULATION THERAPY: CPT | Mod: CQ

## 2024-04-17 PROCEDURE — 97140 MANUAL THERAPY 1/> REGIONS: CPT | Mod: CQ

## 2024-04-17 PROCEDURE — 97110 THERAPEUTIC EXERCISES: CPT | Mod: CQ

## 2024-04-17 NOTE — PROGRESS NOTES
[facility]   Physical Therapy Treatment Note      Name: Angela Landaevrde  Clinic Number: [id number]    Therapy Diagnosis:   Encounter Diagnoses   Name Primary?    Status post total right knee replacement Yes    Decreased ROM of right knee     Quadriceps weakness     Gait disturbance        Physician: Ernestina Ramirez FNP    Visit Date: 4/17/2024    Physician Orders: PT Eval and Treat   Medical Diagnosis from Referral: see above  Evaluation Date: 4/4/2024  Authorization Period Expiration: 6/30/2024   Plan of Care Expiration: 5/31/2024     Date of Surgery: 3/11/2024  Visit # / Visits authorized: 5/ 17    FOTO: 1/ 3     Precautions: Standard    PTA Visit #: 4/5     Time In: 1:05 pm  Time Out: 2:10 pm  Total Billable Time: 60 minutes    Subjective     Pt reports: she is feeling better. She found her Zofran and that has been helping the nausea. She was cleared of DVT so can continue therapy at this time.   She was compliant with home exercise program.  Response to previous treatment: no complaints   Functional change: no change noted    Pain: 5/10  Location: right knee      Objective      Objective Measures updated at progress report unless specified.     -19 degrees extension on arrival,  83 degrees flexion   -10 degrees extension at end, 95 degrees flexion      Treatment     Angela received the treatments listed below:      therapeutic exercises to develop strength, endurance, ROM, flexibility, posture, and core stabilization for 11 minutes including:  NuStep    Slant board 3 x 30 second hold   Ideal stretch  10 x 10 second hold   Supine hamstring stretch w/ belt 10 x 15 second hold   supine heel slides w/ belt to pull into flexion 10 x 15 second hold   Seated leg curl (add next visit)    manual therapy techniques: Joint mobilizations, Manual traction, Myofacial release, Manual Lymphatic Drainage, Soft tissue Mobilization, and Friction Massage were applied to the: right leg for 22 minutes,  including:  Scar massage, gentle overpressure into extension while prone, myofascial release to hamstrings and calf, prone quad stretch with overpressure     neuromuscular re-education activities to improve: [place], Coordination, Kinesthetic Sense, Proprioception, and Posture for 27 minutes. The following activities were included:  Quad sets, straight leg raises, and short arc quads x 5 minutes each  Bilateral leg press 6 plates x 30  Unilateral leg press 3 plates x 15    therapeutic activities to improve functional performance for 0  minutes, including:  (not performed today)     gait training to improve functional mobility and safety for 0  minutes, including:  (not performed today)     supervised modalities after being cleared for contradictions: NMES Electrical Stimulation:  Angela received NMES Electrical Stimulation to elicit muscle contraction of the right quad. Pt received stimulation at a rate of 80 pps with symmetric current, ramp of 3 seconds with 10 second on time and 20 second off time. Patient tolerated treatment well without any adverse effects.     biofeedback to isolate quad contraction, decrease cocontraction, and assist with neuromuscular reeducation for 0 minutes. Exercises performed with biofeedback Including: short arc quads and straight leg raises x 20 each.       Patient Education and Home Exercises       Education provided:   - review of home exercise program and current Plan of Care/rationale of treatment.    Written Home Exercises Provided: Patient instructed to cont prior HEP. Exercises were reviewed and Angela was able to demonstrate them prior to the end of the session.  Angela demonstrated good understanding of the education provided. See EMR under Patient Instructions for exercises provided during therapy sessions    Assessment     At Evaluation:  Angela is a 63 y.o. female referred to outpatient Physical Therapy with a medical diagnosis of s/p right total knee replacement . Patient  presents with typical postop symptoms of swelling, pain, decreased range of motion, quad weakness, and gait disturbance. She is currently off work. To return to work she will be required to do a lot of walking, lifting garbage, sweeping and mopping and carrying food trays or push food carts.     Current Assessment:  Angela remains unable to perform voluntary quad sets at beginning of treatment. She arrived with -19 degrees extension but was able to reach -10 degrees extension and 95 degrees flexion by end of treatment.  She continues to struggle with firing quads voluntarily but did better on leg press compared to mat exercises. She was given flexion and extension stretches for home and advised to stand with her right knee in extension, not relaxing into flexion. Will progress as tolerated.    Angela Is progressing towards her goals.   Pt prognosis is Good.     Pt will continue to benefit from skilled outpatient physical therapy to address the deficits listed in the problem list box on initial evaluation, provide pt/family education and to maximize pt's level of independence in the home and community environment.     Pt's spiritual, cultural and educational needs considered and pt agreeable to plan of care and goals.     Anticipated barriers to physical therapy: none    Goals:   SHORT TERM GOALS - 4 weeks  1.  Patient to be independent with home exercise program to facilitate carryover between therapy visits.  2.  Patient will have  degrees range of motion right knee for improved mobiilty.  3.  Patient will perform straight leg raise without quad lag increasing from resting for increased quad control.  4.  Patient will increase manual muscle test of right lower extremity to 4+ to 5/5 for increased stability with gait and activiites of daily living.  5.  Patient will be able to get in and out of the shower independently.     LONG TERM GOALS - 8 weeks  1.  Patient will go up/down stairs reciprocal pattern without  handrails and good eccentric control on right lower extremity.  2.  Patient will ambulate independent without cane, without deviation and without pain.  3.  Patient will be able to get in and out of the bathtub independently to be able to take a tub bath.  4.  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     Plan     Plan of care Certification: 4/4/2024 to 5/31/2024.     Outpatient Physical Therapy 2 times weekly for 8 weeks to include the following interventions: Electrical Stimulation NMES, Gait Training, Manual Therapy, Moist Heat/ Ice, Neuromuscular Re-ed, Patient Education, Therapeutic Activities, Therapeutic Exercise, and Biofeedback and Vasopneumatic .     Norma San, PTA   04/17/2024

## 2024-04-17 NOTE — TELEPHONE ENCOUNTER
----- Message from Lakshmi Mares sent at 4/17/2024  2:24 PM CDT -----  Patient had a question about her ozempic . She wants to know if its ok for her to start back taking the medication. Please return her call

## 2024-04-17 NOTE — PATIENT INSTRUCTIONS
Access Code: [email or web address]  URL: [email or web address]  Date: 04/17/2024  Prepared by: Caryn San    Exercises  - Prone Quadriceps Stretch with Strap  - 5 x daily - 7 x weekly - 1 sets - 5 reps - 30 second hold  - Prone Knee Extension Hang  - 5 x daily - 7 x weekly - 1 sets - 5 reps - 30 second hold

## 2024-04-17 NOTE — TELEPHONE ENCOUNTER
Spoke with patient and told her that it depends on how she feels and is back to normal after her surgery. Patient voiced understanding.

## 2024-04-22 ENCOUNTER — CLINICAL SUPPORT (OUTPATIENT)
Dept: REHABILITATION | Facility: HOSPITAL | Age: 64
End: 2024-04-22
Payer: COMMERCIAL

## 2024-04-22 DIAGNOSIS — R26.9 GAIT DISTURBANCE: ICD-10-CM

## 2024-04-22 DIAGNOSIS — Z96.651 STATUS POST TOTAL RIGHT KNEE REPLACEMENT: Primary | ICD-10-CM

## 2024-04-22 DIAGNOSIS — M62.81 QUADRICEPS WEAKNESS: ICD-10-CM

## 2024-04-22 DIAGNOSIS — M25.661 DECREASED ROM OF RIGHT KNEE: ICD-10-CM

## 2024-04-22 PROCEDURE — 97014 ELECTRIC STIMULATION THERAPY: CPT | Mod: CQ

## 2024-04-22 PROCEDURE — 97110 THERAPEUTIC EXERCISES: CPT | Mod: CQ

## 2024-04-22 PROCEDURE — 97112 NEUROMUSCULAR REEDUCATION: CPT | Mod: CQ

## 2024-04-22 PROCEDURE — 97140 MANUAL THERAPY 1/> REGIONS: CPT | Mod: CQ

## 2024-04-22 NOTE — PROGRESS NOTES
"[facility]   Physical Therapy Treatment Note      Name: Angela Landaverde  Clinic Number: [id number]    Therapy Diagnosis:   Encounter Diagnoses   Name Primary?    Status post total right knee replacement Yes    Decreased ROM of right knee     Quadriceps weakness     Gait disturbance        Physician: Ernestina Ramirez FNP    Visit Date: 4/22/2024    Physician Orders: PT Eval and Treat   Medical Diagnosis from Referral: see above  Evaluation Date: 4/4/2024  Authorization Period Expiration: 6/30/2024   Plan of Care Expiration: 5/31/2024     Date of Surgery: 3/11/2024  Visit # / Visits authorized: 6/ 17    FOTO: 1/ 3     Precautions: Standard    PTA Visit #: 5/5     Time In: 1:03 pm  Time Out: 2:15 pm  Total Billable Time: 62 minutes    Subjective     Pt reports:  "had some good days and some days when it seems like its going backward." She still has some tenderness but not as bad as it was.  She was compliant with home exercise program.  Response to previous treatment: no complaints   Functional change: no change noted    Pain: 5/10  Location: right knee      Objective      Objective Measures updated at progress report unless specified.     -13 degrees extension on arrival,  88 degrees flexion   -10 degrees extension at end, 95 degrees flexion      Treatment     Angela received the treatments listed below:      therapeutic exercises to develop strength, endurance, ROM, flexibility, posture, and core stabilization for 22 minutes including:  NuStep x 5 minutes    Slant board 3 x 30 second hold   Ideal stretch  10 x 10 second hold   Supine hamstring stretch w/ belt 10 x 15 second hold   supine heel slides w/ belt to pull into flexion 10 x 15 second hold   Seated leg curl 4 plates 3 x 10    manual therapy techniques: Joint mobilizations, Manual traction, Myofacial release, Manual Lymphatic Drainage, Soft tissue Mobilization, and Friction Massage were applied to the: right leg for 13 minutes, " including:  Scar massage, gentle overpressure into extension while prone, myofascial release to hamstrings and calf, prone quad stretch with overpressure     neuromuscular re-education activities to improve: Balance, Coordination, Kinesthetic Sense, Proprioception, and Posture for 25 minutes. The following activities were included:  Quad sets, straight leg raises, and short arc quads x 5 minutes each  Bilateral leg press 8 plates x 30  Unilateral leg press 4 plates x 30    therapeutic activities to improve functional performance for 0  minutes, including:  (not performed today)     gait training to improve functional mobility and safety for 0  minutes, including:  (not performed today)     supervised modalities after being cleared for contradictions: NMES Electrical Stimulation:  Angela received NMES Electrical Stimulation to elicit muscle contraction of the right quad. Pt received stimulation at a rate of 80 pps with symmetric current, ramp of 3 seconds with 10 second on time and 20 second off time. Patient tolerated treatment well without any adverse effects.       Patient Education and Home Exercises       Education provided:   - review of home exercise program and current Plan of Care/rationale of treatment.    Written Home Exercises Provided: Patient instructed to cont prior HEP. Exercises were reviewed and Angela was able to demonstrate them prior to the end of the session.  Angela demonstrated good understanding of the education provided. See EMR under Patient Instructions for exercises provided during therapy sessions    Assessment     At Evaluation:  Angela is a 63 y.o. female referred to outpatient Physical Therapy with a medical diagnosis of s/p right total knee replacement . Patient presents with typical postop symptoms of swelling, pain, decreased range of motion, quad weakness, and gait disturbance. She is currently off work. To return to work she will be required to do a lot of walking, lifting garbage,  sweeping and mopping and carrying food trays or push food carts.     Current Assessment:  Angela arrived with -13 degrees extension but was able to reach -10 degrees extension by end of treatment.  She remains very tight through hamstrings. She had a more effective quad set today. She was able to add seated leg curl with extension stretch between sets. Will progress as tolerated.    Angela Is progressing towards her goals.   Pt prognosis is Good.     Pt will continue to benefit from skilled outpatient physical therapy to address the deficits listed in the problem list box on initial evaluation, provide pt/family education and to maximize pt's level of independence in the home and community environment.     Pt's spiritual, cultural and educational needs considered and pt agreeable to plan of care and goals.     Anticipated barriers to physical therapy: none    Goals:   SHORT TERM GOALS - 4 weeks  1.  Patient to be independent with home exercise program to facilitate carryover between therapy visits.  2.  Patient will have  degrees range of motion right knee for improved mobiilty.  3.  Patient will perform straight leg raise without quad lag increasing from resting for increased quad control.  4.  Patient will increase manual muscle test of right lower extremity to 4+ to 5/5 for increased stability with gait and activiites of daily living.  5.  Patient will be able to get in and out of the shower independently.     LONG TERM GOALS - 8 weeks  1.  Patient will go up/down stairs reciprocal pattern without handrails and good eccentric control on right lower extremity.  2.  Patient will ambulate independent without cane, without deviation and without pain.  3.  Patient will be able to get in and out of the bathtub independently to be able to take a tub bath.  4.  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     Plan     Plan of care Certification: 4/4/2024 to 5/31/2024.     Outpatient  Physical Therapy 2 times weekly for 8 weeks to include the following interventions: Electrical Stimulation NMES, Gait Training, Manual Therapy, Moist Heat/ Ice, Neuromuscular Re-ed, Patient Education, Therapeutic Activities, Therapeutic Exercise, and Biofeedback and Vasopneumatic .     Norma San, PTA   04/22/2024

## 2024-04-24 ENCOUNTER — CLINICAL SUPPORT (OUTPATIENT)
Dept: REHABILITATION | Facility: HOSPITAL | Age: 64
End: 2024-04-24
Payer: COMMERCIAL

## 2024-04-24 DIAGNOSIS — Z96.651 STATUS POST TOTAL RIGHT KNEE REPLACEMENT: Primary | ICD-10-CM

## 2024-04-24 DIAGNOSIS — M62.81 QUADRICEPS WEAKNESS: ICD-10-CM

## 2024-04-24 DIAGNOSIS — M25.661 DECREASED ROM OF RIGHT KNEE: ICD-10-CM

## 2024-04-24 DIAGNOSIS — R26.9 GAIT DISTURBANCE: ICD-10-CM

## 2024-04-24 PROCEDURE — 97112 NEUROMUSCULAR REEDUCATION: CPT

## 2024-04-24 PROCEDURE — 97530 THERAPEUTIC ACTIVITIES: CPT

## 2024-04-24 PROCEDURE — 97110 THERAPEUTIC EXERCISES: CPT

## 2024-04-24 NOTE — PROGRESS NOTES
[facility]   Physical Therapy Treatment Note      Name: Angela Landaverde  Clinic Number: [id number]    Therapy Diagnosis:   Encounter Diagnoses   Name Primary?    Status post total right knee replacement Yes    Decreased ROM of right knee     Quadriceps weakness     Gait disturbance        Physician: Ernestina Ramirez FNP    Visit Date: 4/24/2024    Physician Orders: PT Eval and Treat   Medical Diagnosis from Referral: see above  Evaluation Date: 4/4/2024  Authorization Period Expiration: 6/30/2024   Plan of Care Expiration: 5/31/2024     Date of Surgery: 3/11/2024  Visit # / Visits authorized: 7/17    FOTO: 1/3     Precautions: Standard    PTA Visit #: 0/5     Time In: 1:05 pm  Time Out: 2:00 pm  Total Billable Time: 55 minutes    Subjective     Pt reports:  no new complaints  She was compliant with home exercise program.  Response to previous treatment: no complaints   Functional change: no change noted    Pain: 5/10  Location: right knee      Objective      Objective Measures updated at progress report unless specified.     -13 degrees extension on arrival,  88 degrees flexion   -10 degrees extension at end, 95 degrees flexion      Treatment     Angela received the treatments listed below:      therapeutic exercises to develop strength, endurance, ROM, flexibility, posture, and core stabilization for 22 minutes including:  NuStep - minutes    Slant board 3 x 30 second hold   Ideal stretch  10 x 10 second hold   Propped extension stretch w/ moist hot pack x 5 minutes  Prone hang x 5 min w/ moist hot pack   Supine hamstring stretch w/ belt 10 x 15 second hold   supine heel slides w/ belt to pull into flexion 10 x 15 second hold   Seated leg curl 4 plates 3 x 10  Extension stretch 4 plates 3 x 30sh    manual therapy techniques: Joint mobilizations, Manual traction, Myofacial release, Manual Lymphatic Drainage, Soft tissue Mobilization, and Friction Massage were applied to the: right leg for  "13 minutes, including:  Scar massage, gentle overpressure into extension while prone, myofascial release to hamstrings and calf, prone quad stretch with overpressure     neuromuscular re-education activities to improve: Balance, Coordination, Kinesthetic Sense, Proprioception, and Posture for 20 minutes. The following activities were included:  Quad set on slant board 10 x 10sh  Quad sets, straight leg raises, and short arc quads x 5 minutes each  Bilateral leg press 8 plates x 30 w/ heel rock for terminal knee extension   Unilateral leg press 4 plates x 30 w/ heel rock for terminal knee extension     therapeutic activities to improve functional performance for 0  minutes, including:  Step ups 6"     gait training to improve functional mobility and safety for 0  minutes, including:  (not performed today)     supervised modalities after being cleared for contradictions: NMES Electrical Stimulation:  Angela received NMES Electrical Stimulation to elicit muscle contraction of the right quad. Pt received stimulation at a rate of 80 pps with symmetric current, ramp of 3 seconds with 10 second on time and 20 second off time. Patient tolerated treatment well without any adverse effects.       Patient Education and Home Exercises       Education provided:   - review of home exercise program and current Plan of Care/rationale of treatment.    Written Home Exercises Provided: Patient instructed to cont prior HEP. Exercises were reviewed and Angela was able to demonstrate them prior to the end of the session.  Angela demonstrated good understanding of the education provided. See EMR under Patient Instructions for exercises provided during therapy sessions    Assessment     At Evaluation:  Angela is a 63 y.o. female referred to outpatient Physical Therapy with a medical diagnosis of s/p right total knee replacement . Patient presents with typical postop symptoms of swelling, pain, decreased range of motion, quad weakness, and gait " disturbance. She is currently off work. To return to work she will be required to do a lot of walking, lifting garbage, sweeping and mopping and carrying food trays or push food carts.     Current Assessment:  Angela arrived with -13 degrees extension but was able to reach -10 degrees extension by end of treatment.  She remains very tight through hamstrings. She had a more effective quad set today. She was able to add seated leg curl with extension stretch between sets. Will progress as tolerated.    Angela Is progressing towards her goals.   Pt prognosis is Good.     Pt will continue to benefit from skilled outpatient physical therapy to address the deficits listed in the problem list box on initial evaluation, provide pt/family education and to maximize pt's level of independence in the home and community environment.     Pt's spiritual, cultural and educational needs considered and pt agreeable to plan of care and goals.     Anticipated barriers to physical therapy: none    Goals:   SHORT TERM GOALS - 4 weeks  1.  Patient to be independent with home exercise program to facilitate carryover between therapy visits.  2.  Patient will have  degrees range of motion right knee for improved mobiilty.  3.  Patient will perform straight leg raise without quad lag increasing from resting for increased quad control.  4.  Patient will increase manual muscle test of right lower extremity to 4+ to 5/5 for increased stability with gait and activiites of daily living.  5.  Patient will be able to get in and out of the shower independently.     LONG TERM GOALS - 8 weeks  1.  Patient will go up/down stairs reciprocal pattern without handrails and good eccentric control on right lower extremity.  2.  Patient will ambulate independent without cane, without deviation and without pain.  3.  Patient will be able to get in and out of the bathtub independently to be able to take a tub bath.  4.  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     Plan     Plan of care Certification: 4/4/2024 to 5/31/2024.     Outpatient Physical Therapy 2 times weekly for 8 weeks to include the following interventions: Electrical Stimulation NMES, Gait Training, Manual Therapy, Moist Heat/ Ice, Neuromuscular Re-ed, Patient Education, Therapeutic Activities, Therapeutic Exercise, and Biofeedback and Vasopneumatic .     ANTONIO TRIANA, PT   04/24/2024

## 2024-04-29 ENCOUNTER — CLINICAL SUPPORT (OUTPATIENT)
Dept: REHABILITATION | Facility: HOSPITAL | Age: 64
End: 2024-04-29
Payer: COMMERCIAL

## 2024-04-29 DIAGNOSIS — M25.661 DECREASED ROM OF RIGHT KNEE: ICD-10-CM

## 2024-04-29 DIAGNOSIS — R26.9 GAIT DISTURBANCE: ICD-10-CM

## 2024-04-29 DIAGNOSIS — Z96.651 STATUS POST TOTAL RIGHT KNEE REPLACEMENT: Primary | ICD-10-CM

## 2024-04-29 DIAGNOSIS — M62.81 QUADRICEPS WEAKNESS: ICD-10-CM

## 2024-04-29 PROCEDURE — 97112 NEUROMUSCULAR REEDUCATION: CPT

## 2024-04-29 PROCEDURE — 97110 THERAPEUTIC EXERCISES: CPT

## 2024-04-29 PROCEDURE — 97140 MANUAL THERAPY 1/> REGIONS: CPT

## 2024-04-29 NOTE — PROGRESS NOTES
[facility]   Physical Therapy Treatment Note      Name: Angela Landaverde  Clinic Number: [id number]    Therapy Diagnosis:        Encounter Diagnoses   Name Primary?    Status post total right knee replacement Yes    Decreased ROM of right knee      Quadriceps weakness      Gait disturbance        Physician: Ernestina Ramirez FNP    Visit Date: 4/29/2024    Physician Orders: PT Eval and Treat   Medical Diagnosis from Referral: see above  Evaluation Date: 4/4/2024  Authorization Period Expiration: 6/30/2024   Plan of Care Expiration: 5/31/2024     Date of Surgery: 3/11/2024  Visit # / Visits authorized: 8/17    FOTO: 1/ 3     Precautions: Standard    PTA Visit #: 0/5     Time In: 1:51 pm  Time Out: 2:55 pm  Total Billable Time: 49 minutes (8 minutes not billed)    Subjective     Pt reports:  no new complaints  She was compliant with home exercise program.  Response to previous treatment: no complaints   Functional change: no change noted    Pain: 5/10  Location: right knee      Objective      Objective Measures updated at progress report unless specified.     -15 degrees extension on arrival,  88 degrees flexion   -10 degrees extension at end, 92 degrees flexion      Treatment     Angela received the treatments listed below:      therapeutic exercises to develop strength, endurance, ROM, flexibility, posture, and core stabilization for 22 minutes including:  NuStep x 5 minutes    Slant board x 2 minutes  Ideal stretch  10 x 10 second hold   Propped extension stretch w/ moist hot pack x 5 min  Prone hang  w/ moist hot pack x 5 min  Supine hamstring stretch w/ belt second hold   supine heel slides w/ belt to pull into flexion    Seated leg curl 4 plates 3 x 10  Extension stretch 4 plates 3 x 30sh    manual therapy techniques: Joint mobilizations, Manual traction, Myofacial release, Manual Lymphatic Drainage, Soft tissue Mobilization, and Friction Massage were applied to the: right leg for 10  "minutes, including:  Scar massage, patellar mobs, gentle overpressure into extension while supine, passive heel lifts     neuromuscular re-education activities to improve: Balance, Coordination, Kinesthetic Sense, Proprioception, and Posture for 25 minutes. The following activities were included:  Quad set on slant board 10 x 10sh  Quad sets, straight leg raises, and short arc quads x 4 minutes each  Bilateral leg press 8 plates x 30 w/ heel rock for terminal knee extension - NB  Unilateral leg press 4 plates x 30 w/ heel rock for terminal knee extension - NB    therapeutic activities to improve functional performance for 0  minutes, including:  Step ups 6"     gait training to improve functional mobility and safety for 0  minutes, including:  (not performed today)     supervised modalities after being cleared for contradictions: NMES Electrical Stimulation:  Angela received NMES Electrical Stimulation to elicit muscle contraction of the right quad. Pt received stimulation at a rate of 80 pps with symmetric current, ramp of 3 seconds with 10 second on time and 20 second off time. Patient tolerated treatment well without any adverse effects.       Patient Education and Home Exercises       Education provided:   - review of home exercise program and current Plan of Care/rationale of treatment.    Written Home Exercises Provided: Patient instructed to cont prior HEP. Exercises were reviewed and Angela was able to demonstrate them prior to the end of the session.  Angela demonstrated good understanding of the education provided. See EMR under Patient Instructions for exercises provided during therapy sessions    Assessment     At Evaluation:  Angela is a 63 y.o. female referred to outpatient Physical Therapy with a medical diagnosis of s/p right total knee replacement . Patient presents with typical postop symptoms of swelling, pain, decreased range of motion, quad weakness, and gait disturbance. She is currently off work. To " return to work she will be required to do a lot of walking, lifting garbage, sweeping and mopping and carrying food trays or push food carts.     Current Assessment:  Angela arrived with -13 degrees extension but was able to reach -10 degrees extension by end of treatment.  She remains very tight through hamstrings. She had a more effective quad set today. She was able to add seated leg curl with extension stretch between sets. Will progress as tolerated.    Angela Is progressing towards her goals.   Pt prognosis is Good.     Pt will continue to benefit from skilled outpatient physical therapy to address the deficits listed in the problem list box on initial evaluation, provide pt/family education and to maximize pt's level of independence in the home and community environment.     Pt's spiritual, cultural and educational needs considered and pt agreeable to plan of care and goals.     Anticipated barriers to physical therapy: none    Goals:   SHORT TERM GOALS - 4 weeks  1.  Patient to be independent with home exercise program to facilitate carryover between therapy visits.  2.  Patient will have  degrees range of motion right knee for improved mobiilty.  3.  Patient will perform straight leg raise without quad lag increasing from resting for increased quad control.  4.  Patient will increase manual muscle test of right lower extremity to 4+ to 5/5 for increased stability with gait and activiites of daily living.  5.  Patient will be able to get in and out of the shower independently.     LONG TERM GOALS - 8 weeks  1.  Patient will go up/down stairs reciprocal pattern without handrails and good eccentric control on right lower extremity.  2.  Patient will ambulate independent without cane, without deviation and without pain.  3.  Patient will be able to get in and out of the bathtub independently to be able to take a tub bath.  4.  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      Plan     Plan of care Certification: 4/4/2024 to 5/31/2024.     Outpatient Physical Therapy 2 times weekly for 8 weeks to include the following interventions: Electrical Stimulation NMES, Gait Training, Manual Therapy, Moist Heat/ Ice, Neuromuscular Re-ed, Patient Education, Therapeutic Activities, Therapeutic Exercise, and Biofeedback and Vasopneumatic .     ANTONIO TRIANA, PT   04/29/2024

## 2024-04-30 ENCOUNTER — TELEPHONE (OUTPATIENT)
Dept: ORTHOPEDICS | Facility: CLINIC | Age: 64
End: 2024-04-30
Payer: COMMERCIAL

## 2024-04-30 NOTE — TELEPHONE ENCOUNTER
REFILL SENT    ----- Message from Lili Samlls sent at 4/30/2024  9:48 AM CDT -----  Pt calling to get refill on oxyCODONE-acetaminophen (PERCOCET)  mg per tablet sent to [facility] [address] - call back # [phone]

## 2024-05-01 ENCOUNTER — CLINICAL SUPPORT (OUTPATIENT)
Dept: REHABILITATION | Facility: HOSPITAL | Age: 64
End: 2024-05-01
Attending: NURSE PRACTITIONER
Payer: COMMERCIAL

## 2024-05-01 DIAGNOSIS — M25.661 DECREASED ROM OF RIGHT KNEE: ICD-10-CM

## 2024-05-01 DIAGNOSIS — M62.81 QUADRICEPS WEAKNESS: ICD-10-CM

## 2024-05-01 DIAGNOSIS — Z96.651 STATUS POST TOTAL RIGHT KNEE REPLACEMENT: Primary | ICD-10-CM

## 2024-05-01 DIAGNOSIS — R26.9 GAIT DISTURBANCE: ICD-10-CM

## 2024-05-01 PROCEDURE — 97112 NEUROMUSCULAR REEDUCATION: CPT | Mod: CQ

## 2024-05-01 PROCEDURE — 97014 ELECTRIC STIMULATION THERAPY: CPT | Mod: CQ

## 2024-05-01 PROCEDURE — 97140 MANUAL THERAPY 1/> REGIONS: CPT | Mod: CQ

## 2024-05-01 RX ORDER — OXYCODONE AND ACETAMINOPHEN 5; 325 MG/1; MG/1
1 TABLET ORAL EVERY 6 HOURS PRN
Qty: 20 TABLET | Refills: 0 | Status: SHIPPED | OUTPATIENT
Start: 2024-05-01 | End: 2024-05-20

## 2024-05-01 NOTE — PROGRESS NOTES
[facility]   Physical Therapy Treatment Note      Name: Angela Landaverde  Clinic Number: [id number]    Therapy Diagnosis:        Encounter Diagnoses   Name Primary?    Status post total right knee replacement Yes    Decreased ROM of right knee      Quadriceps weakness      Gait disturbance        Physician: Ernestina Ramirez FNP    Visit Date: 5/1/2024    Physician Orders: PT Eval and Treat   Medical Diagnosis from Referral: see above  Evaluation Date: 4/4/2024  Authorization Period Expiration: 6/30/2024   Plan of Care Expiration: 5/31/2024     Date of Surgery: 3/11/2024  Visit # / Visits authorized: 9/17    FOTO: 1/ 3     Precautions: Standard    PTA Visit #: 1/5     Time In: 1:00 pm  Time Out:  2:11 pm  Total Billable Time: 45 billable minutes (26 non billed minutes)    Subjective     Pt reports:  no new complaints.  More stiffness than pain.  Reports doing HEP 3x a day  She was compliant with home exercise program.  Response to previous treatment: no complaints   Functional change: no change noted    Pain: 0/10  Location: right knee      Objective      Objective Measures updated at progress report unless specified.     -22 degrees extension on arrival,  no lag from there, 90 degrees flexion   -10 degrees extension at end, 92 degrees flexion      Treatment     Angela received the treatments listed below:      therapeutic exercises to develop strength, endurance, ROM, flexibility, posture, and core stabilization for 0 minutes including:  NuStep x 5 minutes    Slant board x 2 minutes  Ideal stretch  10 x 10 second hold   Supine hamstring stretch w/ belt second hold   supine heel slides w/ belt to pull into flexion 10 x 15 second hold     (Not Today)  Seated leg curl 4 plates 3 x 10  Propped extension stretch w/ moist hot pack   Prone hang  w/ moist hot pack   Extension stretch 4 plates 3 x 30sh    manual therapy techniques: Joint mobilizations, Manual traction, Myofacial release, Manual  "Lymphatic Drainage, Soft tissue Mobilization, and Friction Massage were applied to the: right leg for 14 minutes, including:  Scar massage, patellar mobs, gentle overpressure into extension while sitting, and MFR to HS in prone hang position.       neuromuscular re-education activities to improve: Balance, Coordination, Kinesthetic Sense, Proprioception, and Posture for 15 minutes. The following activities were included:  Quad set on slant board 10 x 10sh  Quad sets, straight leg raises, and short arc quads x 4 minutes each--billed with ES  Bilateral leg press 8 plates x 30 w/ heel rock for terminal knee extension   Unilateral leg press 4 plates x 30 w/ heel rock for terminal knee extension     therapeutic activities to improve functional performance for 0  minutes, including:  Step ups 6"     gait training to improve functional mobility and safety for 0  minutes, including:  (not performed today)     supervised modalities after being cleared for contradictions: NMES Electrical Stimulation:  Angela received NMES Electrical Stimulation to elicit muscle contraction of the right quad. Pt received stimulation at a rate of 80 pps with symmetric current, ramp of 3 seconds with 10 second on time and 20 second off time. Patient tolerated treatment well without any adverse effects. 16 min... 8 min QS QS in propped extension with HP and 5# weight...4 min each of SLR ant TKE with strap assistance      Patient Education and Home Exercises       Education provided:   - review of home exercise program and current Plan of Care/rationale of treatment.    Written Home Exercises Provided: Patient instructed to cont prior HEP. Exercises were reviewed and Angela was able to demonstrate them prior to the end of the session.  Angela demonstrated good understanding of the education provided. See EMR under Patient Instructions for exercises provided during therapy sessions    Assessment     At Evaluation:  Angela is a 63 y.o. female referred to " outpatient Physical Therapy with a medical diagnosis of s/p right total knee replacement . Patient presents with typical postop symptoms of swelling, pain, decreased range of motion, quad weakness, and gait disturbance. She is currently off work. To return to work she will be required to do a lot of walking, lifting garbage, sweeping and mopping and carrying food trays or push food carts.     Current Assessment:  Angela arrived with -22 degrees extension and little quad activation.  She remains very tight through hamstrings. Session focused solely on increasing extension ROM.  -10 degrees after session and educated on ways to promote ext @ home.  Severe quad avoidance with cane.  Will progress as tolerated.    Angela Is progressing towards her goals.   Pt prognosis is Good.     Pt will continue to benefit from skilled outpatient physical therapy to address the deficits listed in the problem list box on initial evaluation, provide pt/family education and to maximize pt's level of independence in the home and community environment.     Pt's spiritual, cultural and educational needs considered and pt agreeable to plan of care and goals.     Anticipated barriers to physical therapy: none    Goals:   SHORT TERM GOALS - 4 weeks  1.  Patient to be independent with home exercise program to facilitate carryover between therapy visits.  2.  Patient will have  degrees range of motion right knee for improved mobiilty.  3.  Patient will perform straight leg raise without quad lag increasing from resting for increased quad control.  4.  Patient will increase manual muscle test of right lower extremity to 4+ to 5/5 for increased stability with gait and activiites of daily living.  5.  Patient will be able to get in and out of the shower independently.     LONG TERM GOALS - 8 weeks  1.  Patient will go up/down stairs reciprocal pattern without handrails and good eccentric control on right lower extremity.  2.  Patient will  ambulate independent without cane, without deviation and without pain.  3.  Patient will be able to get in and out of the bathtub independently to be able to take a tub bath.  4.  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     Plan     Plan of care Certification: 4/4/2024 to 5/31/2024.     Outpatient Physical Therapy 2 times weekly for 8 weeks to include the following interventions: Electrical Stimulation NMES, Gait Training, Manual Therapy, Moist Heat/ Ice, Neuromuscular Re-ed, Patient Education, Therapeutic Activities, Therapeutic Exercise, and Biofeedback and Vasopneumatic .     Jadiel Campbell, PTA   05/01/2024

## 2024-05-06 ENCOUNTER — CLINICAL SUPPORT (OUTPATIENT)
Dept: REHABILITATION | Facility: HOSPITAL | Age: 64
End: 2024-05-06
Payer: COMMERCIAL

## 2024-05-06 DIAGNOSIS — M62.81 QUADRICEPS WEAKNESS: ICD-10-CM

## 2024-05-06 DIAGNOSIS — R26.9 GAIT DISTURBANCE: ICD-10-CM

## 2024-05-06 DIAGNOSIS — Z96.651 STATUS POST TOTAL RIGHT KNEE REPLACEMENT: Primary | ICD-10-CM

## 2024-05-06 DIAGNOSIS — M25.661 DECREASED ROM OF RIGHT KNEE: ICD-10-CM

## 2024-05-06 PROCEDURE — 97140 MANUAL THERAPY 1/> REGIONS: CPT | Mod: CQ

## 2024-05-06 PROCEDURE — 97110 THERAPEUTIC EXERCISES: CPT | Mod: CQ

## 2024-05-06 PROCEDURE — 97112 NEUROMUSCULAR REEDUCATION: CPT | Mod: CQ

## 2024-05-06 NOTE — PROGRESS NOTES
[facility]   Physical Therapy Treatment Note      Name: Angela Landaverde  Clinic Number: [id number]    Therapy Diagnosis:        Encounter Diagnoses   Name Primary?    Status post total right knee replacement Yes    Decreased ROM of right knee      Quadriceps weakness      Gait disturbance        Physician: Ernestina Ramirez FNP    Visit Date: 5/6/2024    Physician Orders: PT Eval and Treat   Medical Diagnosis from Referral: see above  Evaluation Date: 4/4/2024  Authorization Period Expiration: 6/30/2024   Plan of Care Expiration: 5/31/2024     Date of Surgery: 3/11/2024  Visit # / Visits authorized: 10/17    FOTO: 2/ 3 (40/54)     Precautions: Standard    PTA Visit #: 1/5     Time In: 1:05 pm  Time Out:  2:00 pm  Total Billable Time: 45 billable minutes (15 non billed minutes)    Subjective     Pt reports:  no new complaints.  More stiffness than pain.  Reports doing HEP 3x a day  She was compliant with home exercise program.  Response to previous treatment: no complaints   Functional change: no change noted    Pain: 0/10  Location: right knee      Objective      Objective Measures updated at progress report unless specified.     -22 degrees extension on arrival,  no lag from there, 90 degrees flexion   -15 degrees extension at end, 92 degrees flexion      Treatment     Angela received the treatments listed below:      therapeutic exercises to develop strength, endurance, ROM, flexibility, posture, and core stabilization for 10 minutes including:  NuStep x 5 minutes    Slant board x 2 minutes  Seated hamstring stretch 3 x 30 second hold     (Not Today)  Seated leg curl 4 plates 3 x 10  Propped extension stretch w/ moist hot pack   Prone hang  w/ moist hot pack   Extension stretch 4 plates 3 x 30sh  Ideal stretch  10 x 10 second hold   supine heel slides w/ belt to pull into flexion 10 x 15 second hold     manual therapy techniques: Joint mobilizations, Manual traction, Myofacial release,  "Manual Lymphatic Drainage, Soft tissue Mobilization, and Friction Massage were applied to the: right leg for 7 minutes, including:  Scar massage, patellar mobs, gentle overpressure into extension while sitting, and MFR to HS in prone hang position.       neuromuscular re-education activities to improve: Balance, Coordination, Kinesthetic Sense, Proprioception, and Posture for 20 minutes. The following activities were included:  Quad set on slant board 10 x 10 second hold   Quad sets, straight leg raises, and short arc quads   Bilateral leg press 8 plates x 30 w/ heel rock for terminal knee extension   Unilateral leg press 4 plates x 30 w/ heel rock for terminal knee extension   Closed chain terminal knee extension 3 plates x 30  Seated leg extension 1 plate x 30 up bilateral down unilateral     therapeutic activities to improve functional performance for 5 minutes, including:  Step ups 6" forward x 30    gait training to improve functional mobility and safety for 3  billable minutes, including:  In parallel bars with focus on heel strike and toe off in forward and retro (10 minutes total)    supervised modalities after being cleared for contradictions: NMES Electrical Stimulation:  Angela received NMES Electrical Stimulation to elicit muscle contraction of the right quad. Pt received stimulation at a rate of 80 pps with symmetric current, ramp of 3 seconds with 10 second on time and 20 second off time. Patient tolerated treatment well without any adverse effects. 0 min... 8 min QS QS in propped extension with HP and 5# weight...4 min each of SLR ant TKE with strap assistance      Patient Education and Home Exercises       Education provided:   - review of home exercise program and current Plan of Care/rationale of treatment.    Written Home Exercises Provided: Patient instructed to cont prior HEP. Exercises were reviewed and Angela was able to demonstrate them prior to the end of the session.  Angela demonstrated good " understanding of the education provided. See EMR under Patient Instructions for exercises provided during therapy sessions    Assessment     At Evaluation:  Angela is a 63 y.o. female referred to outpatient Physical Therapy with a medical diagnosis of s/p right total knee replacement . Patient presents with typical postop symptoms of swelling, pain, decreased range of motion, quad weakness, and gait disturbance. She is currently off work. To return to work she will be required to do a lot of walking, lifting garbage, sweeping and mopping and carrying food trays or push food carts.     Current Assessment:  Angela continues to lose extension between sessions and struggle with initiating quad sets on arrival. Spent today focusing on functional extension and working on gait to improve maintenance of range of motion gains. She reported feeling more aware of her leg and movement by end of session and agreed to practice heel strike and toe off at home. Will progress as tolerated.    Angela Is progressing towards her goals.   Pt prognosis is Good.     Pt will continue to benefit from skilled outpatient physical therapy to address the deficits listed in the problem list box on initial evaluation, provide pt/family education and to maximize pt's level of independence in the home and community environment.     Pt's spiritual, cultural and educational needs considered and pt agreeable to plan of care and goals.     Anticipated barriers to physical therapy: none    Goals:   SHORT TERM GOALS - 4 weeks  1.  Patient to be independent with home exercise program to facilitate carryover between therapy visits.  2.  Patient will have  degrees range of motion right knee for improved mobiilty.  3.  Patient will perform straight leg raise without quad lag increasing from resting for increased quad control.  4.  Patient will increase manual muscle test of right lower extremity to 4+ to 5/5 for increased stability with gait and activiites  of daily living.  5.  Patient will be able to get in and out of the shower independently.     LONG TERM GOALS - 8 weeks  1.  Patient will go up/down stairs reciprocal pattern without handrails and good eccentric control on right lower extremity.  2.  Patient will ambulate independent without cane, without deviation and without pain.  3.  Patient will be able to get in and out of the bathtub independently to be able to take a tub bath.  4.  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     Plan     Plan of care Certification: 4/4/2024 to 5/31/2024.     Outpatient Physical Therapy 2 times weekly for 8 weeks to include the following interventions: Electrical Stimulation NMES, Gait Training, Manual Therapy, Moist Heat/ Ice, Neuromuscular Re-ed, Patient Education, Therapeutic Activities, Therapeutic Exercise, and Biofeedback and Vasopneumatic .     Norma San, PTA   05/06/2024

## 2024-05-07 NOTE — PROGRESS NOTES
[facility]    Chief Complaint      Chief Complaint   Patient presents with    Hypertension     Med refill       History of Present Illness      Angela Landaverde is a 63 y.o. female that  has a past medical history of Arthritis, Hypertension, and Thyroid disease.     HPI    The patient presents today for medication refills for hypertension.       Past Medical History:  Past Medical History:   Diagnosis Date    Arthritis     Hypertension     Thyroid disease        Past Surgical History:   has a past surgical history that includes  section; Tubal ligation; and robotic arthroplasty, knee (Right, 3/11/2024).    Social History:  Social History     Tobacco Use    Smoking status: Never    Smokeless tobacco: Never   Substance Use Topics    Alcohol use: Not Currently     Comment: occasionally    Drug use: Never       I personally reviewed all past medical, surgical, and social.     Review of Systems   Constitutional:  Negative for chills and fever.   HENT:  Negative for ear pain and sore throat.    Eyes:  Negative for blurred vision.   Respiratory:  Negative for cough and shortness of breath.    Cardiovascular:  Negative for chest pain and palpitations.   Gastrointestinal:  Negative for abdominal pain and constipation.   Genitourinary:  Negative for dysuria and hematuria.   Musculoskeletal:  Negative for back pain and falls.   Skin:  Negative for itching and rash.   Neurological:  Negative for weakness and headaches.   Endo/Heme/Allergies:  Negative for polydipsia. Does not bruise/bleed easily.   Psychiatric/Behavioral:  Negative for suicidal ideas. The patient does not have insomnia.         Medications:  Outpatient Encounter Medications as of 4/3/2024   Medication Sig Dispense Refill    aspirin (ECOTRIN) 81 MG EC tablet Take 1 tablet (81 mg total) by mouth 2 (two) times a day. 60 tablet 0    celecoxib (CELEBREX) 200 MG capsule Take 1 capsule (200 mg total) by mouth 2 (two) times daily. 60 capsule 2     nystatin-triamcinolone (MYCOLOG II) cream Apply 1 each topically.      ondansetron (ZOFRAN-ODT) 4 MG TbDL Take 2 tablets (8 mg total) by mouth every 8 (eight) hours as needed (Nausea). 30 tablet 0    senna-docusate 8.6-50 mg (PERICOLACE) 8.6-50 mg per tablet Take 1 tablet by mouth 2 (two) times daily. 60 tablet 2    sulfamethoxazole-trimethoprim 800-160mg (BACTRIM DS) 800-160 mg Tab Take 1 tablet by mouth 2 (two) times daily. 14 tablet 0    [DISCONTINUED] allopurinoL (ZYLOPRIM) 100 MG tablet Take 1 tablet (100 mg total) by mouth once daily. 90 tablet 2    [DISCONTINUED] atenoloL-chlorthalidone (TENORETIC) 50-25 mg Tab Take 1 tablet by mouth once daily. 90 tablet 2    [DISCONTINUED] diclofenac sodium (VOLTAREN) 1 % Gel APPLY 2 GRAMS TO AFFECTED AREAS EVERY 6 HOURS AS NEEDED 50 g 3    [DISCONTINUED] levothyroxine (SYNTHROID) 88 MCG tablet Take 1 tablet (88 mcg total) by mouth before breakfast. 30 tablet 0    [DISCONTINUED] methocarbamoL (ROBAXIN) 750 MG Tab TAKE 1 TABLET BY MOUTH 4 TIMES DAILY AS NEEDED 30 tablet 4    [DISCONTINUED] oxyCODONE-acetaminophen (PERCOCET)  mg per tablet Take 1 tablet by mouth every 6 (six) hours as needed for Pain. 30 tablet 0    allopurinoL (ZYLOPRIM) 100 MG tablet Take 1 tablet (100 mg total) by mouth once daily. 90 tablet 1    atenoloL-chlorthalidone (TENORETIC) 50-25 mg Tab Take 1 tablet by mouth once daily. 90 tablet 1    cholecalciferol, vitamin D3, 1,250 mcg (50,000 unit) capsule Take 1 capsule (50,000 Units total) by mouth once a week. 12 capsule 1    diclofenac sodium (VOLTAREN) 1 % Gel APPLY 2 GRAMS TO AFFECTED AREAS EVERY 6 HOURS AS NEEDED 50 g 3    gabapentin (NEURONTIN) 300 MG capsule Take 1 capsule (300 mg total) by mouth daily as needed (pain). 90 capsule 1    methocarbamoL (ROBAXIN) 750 MG Tab TAKE 1 TABLET BY MOUTH 4 TIMES DAILY AS NEEDED 30 tablet 4    [DISCONTINUED] gabapentin (NEURONTIN) 100 MG capsule Take 3 capsules (300 mg total) by mouth daily as needed  "(pain). 90 capsule 1    [DISCONTINUED] levothyroxine (SYNTHROID) 88 MCG tablet Take 1 tablet (88 mcg total) by mouth before breakfast. 30 tablet 0     No facility-administered encounter medications on file as of 4/3/2024.       Allergies:  Review of patient's allergies indicates:  No Known Allergies    Health Maintenance:  Immunization History   Administered Date(s) Administered    COVID-19 MRNA, LN-S PF (MODERNA HALF 0.25 ML DOSE) 01/06/2022    COVID-19, MRNA, LN-S, PF (Pfizer) (Purple Cap) 05/07/2021, 05/28/2021    Influenza - Quadrivalent - PF *Preferred* (6 months and older) 12/13/2023    Tdap 09/05/2023      Health Maintenance   Topic Date Due    Shingles Vaccine (1 of 2) Never done    Colorectal Cancer Screening  08/05/2023    Mammogram  11/15/2024    Lipid Panel  09/05/2028    TETANUS VACCINE  09/05/2033    Hepatitis C Screening  Completed        Physical Exam      Vital Signs  Pulse: 80  SpO2: 99 %  BP: 134/74  Patient Position: Sitting  Height and Weight  Height: 5' 7" (170.2 cm)  Weight: 124.7 kg (275 lb)  BSA (Calculated - sq m): 2.43 sq meters  BMI (Calculated): 43.1  Weight in (lb) to have BMI = 25: 159.3]    Physical Exam  Vitals and nursing note reviewed.   Constitutional:       Appearance: Normal appearance.   HENT:      Head: Normocephalic and atraumatic.   Cardiovascular:      Rate and Rhythm: Normal rate and regular rhythm.      Heart sounds: Normal heart sounds.   Pulmonary:      Effort: Pulmonary effort is normal.      Breath sounds: Normal breath sounds.   Skin:     Findings: No rash.   Neurological:      General: No focal deficit present.      Mental Status: She is alert and oriented to person, place, and time.      Gait: Gait normal.   Psychiatric:         Mood and Affect: Mood normal.         Behavior: Behavior normal.         Thought Content: Thought content normal.         Judgment: Judgment normal.          Laboratory:  CBC:  Recent Labs   Lab 01/30/23  1446 02/29/24  0904 03/12/24  0506 "   WBC 7.20 3.78 L 9.52   RBC 4.34 4.49 3.52 L   Hemoglobin 12.5 12.9 10.3 L   Hematocrit 38.9 38.2 30.9 L   Platelet Count 260 228 193   MCV 89.6 85.1 87.8   MCH 28.8 28.7 29.3   MCHC 32.1 33.8 33.3     CMP:  Recent Labs   Lab 01/30/23  1446 09/05/23  1351 02/29/24  0904 03/12/24  0506   Glucose 97 92 100 113 H   Calcium 9.3 8.9 9.4 8.4 L   Albumin 3.8 3.6 3.6  --    Total Protein 7.0 6.8 7.3  --    Sodium 143 144 141 141   Potassium 4.2 4.3 3.6 3.1 L   CO2 33 H 33 H 34 H 29   Chloride 104 105 103 107   BUN 14 12 17 13   Alk Phos 75 58 56  --    ALT 23 19 19  --    AST 17 18 15  --    Bilirubin, Total 0.6 0.6 0.8  --      LIPIDS:  Recent Labs   Lab 02/16/22  0914 08/30/22  0848 11/07/22  1300 09/05/23  1351 03/12/24  0506   TSH 3.210   < > 1.860 1.490 0.351 L   HDL Cholesterol 75 H  --   --  74 H  --    Cholesterol 184  --   --  178  --    Triglycerides 47  --   --  52  --    LDL Calculated 100  --   --  94  --    Cholesterol/HDL Ratio (Risk Factor) 2.5  --   --  2.4  --    Non-  --   --  104  --     < > = values in this interval not displayed.     TSH:  Recent Labs   Lab 11/07/22  1300 09/05/23  1351 03/12/24  0506   TSH 1.860 1.490 0.351 L     A1C:  Recent Labs   Lab 09/05/23  1351   Hemoglobin A1C 5.1       Assessment/Plan     Angela Landaverde is a 63 y.o.female with:    1. Essential hypertension  -     atenoloL-chlorthalidone (TENORETIC) 50-25 mg Tab; Take 1 tablet by mouth once daily.  Dispense: 90 tablet; Refill: 1    2. Arthritis  -     diclofenac sodium (VOLTAREN) 1 % Gel; APPLY 2 GRAMS TO AFFECTED AREAS EVERY 6 HOURS AS NEEDED  Dispense: 50 g; Refill: 3    3. Hypothyroidism, unspecified type  -    levothyroxine (SYNTHROID) 88 MCG tablet; Take 1 tablet (88 mcg total) by mouth before breakfast.  Dispense: 30 tablet; Refill: 0    4. Vitamin D deficiency  -     cholecalciferol, vitamin D3, 1,250 mcg (50,000 unit) capsule; Take 1 capsule (50,000 Units total) by mouth once a week.  Dispense: 12 capsule; Refill:  1    5. Gout, unspecified cause, unspecified chronicity, unspecified site  -     allopurinoL (ZYLOPRIM) 100 MG tablet; Take 1 tablet (100 mg total) by mouth once daily.  Dispense: 90 tablet; Refill: 1    6. Other chronic pain  -     methocarbamoL (ROBAXIN) 750 MG Tab; TAKE 1 TABLET BY MOUTH 4 TIMES DAILY AS NEEDED  Dispense: 30 tablet; Refill: 4    Other orders  -     gabapentin (NEURONTIN) 300 MG capsule; Take 1 capsule (300 mg total) by mouth daily as needed (pain).  Dispense: 90 capsule; Refill: 1        Visit today included increased complexity associated with managing the longitudinal care of the patient due to the serious and/or complex managed problem(s) of  if it hypertension, hypothyroidism, arthritis, vitamin-D deficiency, and gout.     Chronic conditions status updated as per HPI.  Other than changes above, cont current medications and maintain follow up with specialists.  Return to clinic in 3 month(s) for medication refills.    Joana Parmar DO  [facility]

## 2024-05-08 ENCOUNTER — CLINICAL SUPPORT (OUTPATIENT)
Dept: REHABILITATION | Facility: HOSPITAL | Age: 64
End: 2024-05-08
Attending: NURSE PRACTITIONER
Payer: COMMERCIAL

## 2024-05-08 DIAGNOSIS — M62.81 QUADRICEPS WEAKNESS: ICD-10-CM

## 2024-05-08 DIAGNOSIS — R26.9 GAIT DISTURBANCE: ICD-10-CM

## 2024-05-08 DIAGNOSIS — M25.661 DECREASED ROM OF RIGHT KNEE: ICD-10-CM

## 2024-05-08 DIAGNOSIS — Z96.651 STATUS POST TOTAL RIGHT KNEE REPLACEMENT: Primary | ICD-10-CM

## 2024-05-08 PROCEDURE — 97112 NEUROMUSCULAR REEDUCATION: CPT | Mod: CQ

## 2024-05-08 PROCEDURE — 97140 MANUAL THERAPY 1/> REGIONS: CPT | Mod: CQ

## 2024-05-08 PROCEDURE — 97014 ELECTRIC STIMULATION THERAPY: CPT | Mod: CQ

## 2024-05-08 NOTE — PROGRESS NOTES
[facility]   Physical Therapy Treatment Note      Name: Angela Landaverde  Clinic Number: [id number]    Therapy Diagnosis:        Encounter Diagnoses   Name Primary?    Status post total right knee replacement Yes    Decreased ROM of right knee      Quadriceps weakness      Gait disturbance        Physician: Ernestina Ramirez FNP    Visit Date: 5/8/2024    Physician Orders: PT Eval and Treat   Medical Diagnosis from Referral: see above  Evaluation Date: 4/4/2024  Authorization Period Expiration: 6/30/2024   Plan of Care Expiration: 5/31/2024     Date of Surgery: 3/11/2024  Visit # / Visits authorized: 11/17    FOTO: 2/ 3 (40/54)     Precautions: Standard    PTA Visit #: 2/5     Time In: 1:00 pm  Time Out:   2:04 pm  Total Billable Time:  41 billable minutes ( non billed minutes)    Subjective     Pt reports:  no new complaints.  More stiffness than pain.  Reports doing HEP 3x a day  She was compliant with home exercise program.  Response to previous treatment: no complaints   Functional change: no change noted    Pain: 0/10  Location: right knee      Objective      Objective Measures updated at progress report unless specified.     -22 degrees extension on arrival,  no lag from there, 90 degrees flexion     -10 degrees after LLLD on extension board  -7 degrees after [place] stretch      Treatment     Angela received the treatments listed below:      therapeutic exercises to develop strength, endurance, ROM, flexibility, posture, and core stabilization for 0 minutes including:  NuStep x 5 minutes    Slant board x 2 minutes  Seated hamstring stretch 3 x 30 second hold     (Not Today)  Seated leg curl 4 plates 3 x 10  Propped extension stretch w/ moist hot pack   Prone hang  w/ moist hot pack   Extension stretch 4 plates 3 x 30sh  Ideal stretch  10 x 10 second hold   supine heel slides w/ belt to pull into flexion 10 x 15 second hold     manual therapy techniques: Joint mobilizations, Manual  "traction, Myofacial release, Manual Lymphatic Drainage, Soft tissue Mobilization, and Friction Massage were applied to the: right leg for 23 minutes, including:  LLLD with extension board x 8 min with HP and tightening every 2 minutes  Scar massage, patellar mobs, gentle overpressure into extension while sitting, and MFR to HS in prone hang position.       neuromuscular re-education activities to improve: Balance, Coordination, Kinesthetic Sense, Proprioception, and Posture for 10 minutes. The following activities were included:  Quad set on slant board 10 x 10 second hold    Bilateral leg press 8 plates x 30 w/ heel rock for terminal knee extension   Unilateral leg press 4 plates x 30 w/ heel rock for terminal knee extension   Closed chain terminal knee extension 3 plates x 30    (Not Today)  Seated leg extension 1 plate x 30 up bilateral down unilateral     therapeutic activities to improve functional performance for 0 minutes, including:  -    (Not Today)  Step ups 6" forward x 30    gait training to improve functional mobility and safety for  0  billable minutes, including:  -    (Not Today)  In parallel bars with focus on heel strike and toe off in forward and retro (10 minutes total)    supervised modalities after being cleared for contradictions: NMES Electrical Stimulation:  Angela received NMES Electrical Stimulation to elicit muscle contraction of the right quad. Pt received stimulation at a rate of 80 pps with symmetric current, ramp of 3 seconds with 10 second on time and 20 second off time. Patient tolerated treatment well without any adverse effects. 8 min... 8 min QS QS in propped extension with HP and 5# weight.      Patient Education and Home Exercises       Education provided:   - review of home exercise program and current Plan of Care/rationale of treatment.    Written Home Exercises Provided: Patient instructed to cont prior HEP. Exercises were reviewed and Angela was able to demonstrate them prior " to the end of the session.  Angela demonstrated good understanding of the education provided. See EMR under Patient Instructions for exercises provided during therapy sessions    Assessment     At Evaluation:  Angela is a 63 y.o. female referred to outpatient Physical Therapy with a medical diagnosis of s/p right total knee replacement . Patient presents with typical postop symptoms of swelling, pain, decreased range of motion, quad weakness, and gait disturbance. She is currently off work. To return to work she will be required to do a lot of walking, lifting garbage, sweeping and mopping and carrying food trays or push food carts.     Current Assessment:  Angela continues to lose extension between sessions and struggle with initiating quad sets on arrival. Decided to do Extension board today and work solely on improving extension and quad control.  -22 degrees extension on arrival, -10 degrees after extension board, and -7 degrees after [place] stretch.  Discussed in detail the need to keep knee straight.  Cont POC    Angela Is progressing towards her goals.   Pt prognosis is Good.     Pt will continue to benefit from skilled outpatient physical therapy to address the deficits listed in the problem list box on initial evaluation, provide pt/family education and to maximize pt's level of independence in the home and community environment.     Pt's spiritual, cultural and educational needs considered and pt agreeable to plan of care and goals.     Anticipated barriers to physical therapy: none    Goals:   SHORT TERM GOALS - 4 weeks  1.  Patient to be independent with home exercise program to facilitate carryover between therapy visits.  2.  Patient will have  degrees range of motion right knee for improved mobiilty.  3.  Patient will perform straight leg raise without quad lag increasing from resting for increased quad control.  4.  Patient will increase manual muscle test of right lower extremity to 4+ to 5/5 for  increased stability with gait and activiites of daily living.  5.  Patient will be able to get in and out of the shower independently.     LONG TERM GOALS - 8 weeks  1.  Patient will go up/down stairs reciprocal pattern without handrails and good eccentric control on right lower extremity.  2.  Patient will ambulate independent without cane, without deviation and without pain.  3.  Patient will be able to get in and out of the bathtub independently to be able to take a tub bath.  4.  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     Plan     Plan of care Certification: 4/4/2024 to 5/31/2024.     Outpatient Physical Therapy 2 times weekly for 8 weeks to include the following interventions: Electrical Stimulation NMES, Gait Training, Manual Therapy, Moist Heat/ Ice, Neuromuscular Re-ed, Patient Education, Therapeutic Activities, Therapeutic Exercise, and Biofeedback and Vasopneumatic .     Jadiel Campbell, PTA   05/08/2024

## 2024-05-13 ENCOUNTER — CLINICAL SUPPORT (OUTPATIENT)
Dept: REHABILITATION | Facility: HOSPITAL | Age: 64
End: 2024-05-13
Payer: COMMERCIAL

## 2024-05-13 DIAGNOSIS — R26.9 GAIT DISTURBANCE: ICD-10-CM

## 2024-05-13 DIAGNOSIS — M25.661 DECREASED ROM OF RIGHT KNEE: ICD-10-CM

## 2024-05-13 DIAGNOSIS — Z96.651 STATUS POST TOTAL RIGHT KNEE REPLACEMENT: Primary | ICD-10-CM

## 2024-05-13 DIAGNOSIS — M62.81 QUADRICEPS WEAKNESS: ICD-10-CM

## 2024-05-13 PROCEDURE — 97110 THERAPEUTIC EXERCISES: CPT | Mod: CQ

## 2024-05-13 PROCEDURE — 97112 NEUROMUSCULAR REEDUCATION: CPT | Mod: CQ

## 2024-05-13 PROCEDURE — 97140 MANUAL THERAPY 1/> REGIONS: CPT | Mod: CQ

## 2024-05-13 NOTE — PROGRESS NOTES
[facility]   Physical Therapy Treatment Note      Name: Angela Landaverde  Clinic Number: [id number]    Therapy Diagnosis:        Encounter Diagnoses   Name Primary?    Status post total right knee replacement Yes    Decreased ROM of right knee      Quadriceps weakness      Gait disturbance        Physician: Ernestina Ramirez FNP    Visit Date: 5/13/2024    Physician Orders: PT Eval and Treat   Medical Diagnosis from Referral: see above  Evaluation Date: 4/4/2024  Authorization Period Expiration: 6/30/2024   Plan of Care Expiration: 5/31/2024     Date of Surgery: 3/11/2024  Visit # / Visits authorized: 12/17    FOTO: 2/ 3 (40/54)     Precautions: Standard    PTA Visit #: 4/5     Time In: 1:07 pm  Time Out:   2:02 pm  Total Billable Time:  42 billable minutes ( 13 non billed minutes)    Subjective     Pt reports:  knee remains stiff  She was compliant with home exercise program.  Response to previous treatment: no complaints   Functional change: no change noted    Pain: 0/10  Location: right knee      Objective      Objective Measures updated at progress report unless specified.     -19 degrees extension on arrival,  no lag from there, 87 degrees flexion     -12 degrees after LLLD on extension board  -12 degrees after [place] stretch      Treatment     Angela received the treatments listed below:      therapeutic exercises to develop strength, endurance, ROM, flexibility, posture, and core stabilization for 10 minutes including:  NuStep x 5 minutes    Slant board x 2 minutes  Standing hamstring stretch 3 x 30 second hold   Ideal stretch  3 x 30 second hold     (Not Today)  Seated leg curl 4 plates 3 x 10  Propped extension stretch w/ moist hot pack   Prone hang  w/ moist hot pack   Extension stretch 4 plates 3 x 30sh  supine heel slides w/ belt to pull into flexion 10 x 15 second hold     manual therapy techniques: Joint mobilizations, Manual traction, Myofacial release, Manual Lymphatic  "Drainage, Soft tissue Mobilization, and Friction Massage were applied to the: right leg for 18 minutes, including:  LLLD with extension board x 8 min with HP and tightening every 2 minutes  Scar massage, patellar mobs, gentle overpressure into extension while sitting, and MFR to HS in prone hang position.       neuromuscular re-education activities to improve: Balance, Coordination, Kinesthetic Sense, Proprioception, and Posture for 14 minutes. The following activities were included:  Quad set on slant board 10 x 10 second hold    Prone quad sets x 20  Bilateral leg press 8 plates x 30 w/ heel rock for terminal knee extension   Unilateral leg press 4 plates x 30 w/ heel rock for terminal knee extension   Closed chain terminal knee extension 3 plates x 30    (Not Today)  Seated leg extension 1 plate x 30 up bilateral down unilateral     therapeutic activities to improve functional performance for 0 minutes, including:  -    (Not Today)  Step ups 6" forward x 30    gait training to improve functional mobility and safety for  0  billable minutes, including:  -    (Not Today)  In parallel bars with focus on heel strike and toe off in forward and retro (10 minutes total)    supervised modalities after being cleared for contradictions: NMES Electrical Stimulation:  Angela received NMES Electrical Stimulation to elicit muscle contraction of the right quad. Pt received stimulation at a rate of 80 pps with symmetric current, ramp of 3 seconds with 10 second on time and 20 second off time. Patient tolerated treatment well without any adverse effects. 0 min... 8 min QS QS in propped extension with HP and 5# weight.      Patient Education and Home Exercises       Education provided:   - review of home exercise program and current Plan of Care/rationale of treatment.    Written Home Exercises Provided: Patient instructed to cont prior HEP. Exercises were reviewed and Angela was able to demonstrate them prior to the end of the " session.  Angela demonstrated good understanding of the education provided. See EMR under Patient Instructions for exercises provided during therapy sessions    Assessment     At Evaluation:  Angela is a 63 y.o. female referred to outpatient Physical Therapy with a medical diagnosis of s/p right total knee replacement . Patient presents with typical postop symptoms of swelling, pain, decreased range of motion, quad weakness, and gait disturbance. She is currently off work. To return to work she will be required to do a lot of walking, lifting garbage, sweeping and mopping and carrying food trays or push food carts.     Current Assessment:  Angela lost her extension gains from last visit. She arrived with -19 degrees extension and left with -12 despite best efforts at stretching. She remains unable to fire quads without hamstrings and doesn't get a good contraction even with electrical muscle stimulation. She is very tender in hamstrings and struggled with prone quad sets. Will continue to address functional needs as able.     Angela Is progressing towards her goals.   Pt prognosis is Good.     Pt will continue to benefit from skilled outpatient physical therapy to address the deficits listed in the problem list box on initial evaluation, provide pt/family education and to maximize pt's level of independence in the home and community environment.     Pt's spiritual, cultural and educational needs considered and pt agreeable to plan of care and goals.     Anticipated barriers to physical therapy: none    Goals:   SHORT TERM GOALS - 4 weeks  1.  Patient to be independent with home exercise program to facilitate carryover between therapy visits.  2.  Patient will have  degrees range of motion right knee for improved mobiilty.  3.  Patient will perform straight leg raise without quad lag increasing from resting for increased quad control.  4.  Patient will increase manual muscle test of right lower extremity to 4+ to 5/5  for increased stability with gait and activiites of daily living.  5.  Patient will be able to get in and out of the shower independently.     LONG TERM GOALS - 8 weeks  1.  Patient will go up/down stairs reciprocal pattern without handrails and good eccentric control on right lower extremity.  2.  Patient will ambulate independent without cane, without deviation and without pain.  3.  Patient will be able to get in and out of the bathtub independently to be able to take a tub bath.  4.  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     Plan     Plan of care Certification: 4/4/2024 to 5/31/2024.     Outpatient Physical Therapy 2 times weekly for 8 weeks to include the following interventions: Electrical Stimulation NMES, Gait Training, Manual Therapy, Moist Heat/ Ice, Neuromuscular Re-ed, Patient Education, Therapeutic Activities, Therapeutic Exercise, and Biofeedback and Vasopneumatic .     Norma San, PTA   05/13/2024

## 2024-05-15 ENCOUNTER — TELEPHONE (OUTPATIENT)
Dept: ORTHOPEDICS | Facility: CLINIC | Age: 64
End: 2024-05-15
Payer: COMMERCIAL

## 2024-05-15 ENCOUNTER — CLINICAL SUPPORT (OUTPATIENT)
Dept: REHABILITATION | Facility: HOSPITAL | Age: 64
End: 2024-05-15
Payer: COMMERCIAL

## 2024-05-15 DIAGNOSIS — Z96.651 STATUS POST TOTAL RIGHT KNEE REPLACEMENT: Primary | ICD-10-CM

## 2024-05-15 DIAGNOSIS — M62.81 QUADRICEPS WEAKNESS: ICD-10-CM

## 2024-05-15 DIAGNOSIS — M25.661 DECREASED ROM OF RIGHT KNEE: ICD-10-CM

## 2024-05-15 DIAGNOSIS — R26.9 GAIT DISTURBANCE: ICD-10-CM

## 2024-05-15 PROCEDURE — 97112 NEUROMUSCULAR REEDUCATION: CPT

## 2024-05-15 PROCEDURE — 97110 THERAPEUTIC EXERCISES: CPT

## 2024-05-15 PROCEDURE — 97140 MANUAL THERAPY 1/> REGIONS: CPT

## 2024-05-15 NOTE — TELEPHONE ENCOUNTER
Called patient to let her know Ernestina sent her refill in.     ----- Message from Smiley Vallecillo sent at 5/15/2024 12:16 PM CDT -----  Regarding: refill  Patient needs a refill on oxycodone 5/325 called into [facility] at [phone].  Please call patient at [phone] if you have any questions. Thanks!

## 2024-05-15 NOTE — PROGRESS NOTES
[facility]   Physical Therapy Treatment Note      Name: Angela Landaverde  Clinic Number: [id number]    Therapy Diagnosis:        Encounter Diagnoses   Name Primary?    Status post total right knee replacement Yes    Decreased ROM of right knee      Quadriceps weakness      Gait disturbance        Physician: Ernestina Ramirez FNP    Visit Date: 5/15/2024    Physician Orders: PT Eval and Treat   Medical Diagnosis from Referral: see above  Evaluation Date: 4/4/2024  Authorization Period Expiration: 6/30/2024   Plan of Care Expiration: 5/31/2024     Date of Surgery: 3/11/2024  Visit # / Visits authorized: 13/17    FOTO: 2/ 3 (40/54)     Precautions: Standard    PTA Visit #: 0/5     Time In: 1:02 pm  Time Out: 2:04 pm  Total Billable Time:  50 billable minutes     Subjective     Pt reports:  having a little pain today  She was compliant with home exercise program.  Response to previous treatment: no complaints   Functional change: no change noted    Pain: 5/10  Location: right knee      Objective      Objective Measures updated at progress report unless specified.     -18 degrees extension on arrival,  no lag from there, 87 degrees flexion   -12 degrees after [place] stretch  -10 degrees after LLLD on extension board        Treatment     Angela received the treatments listed below:      therapeutic exercises to develop strength, endurance, ROM, flexibility, posture, and core stabilization for 18 minutes including:  NuStep x 5 minutes    Slant board x 2 minutes  Knee flexion and extension stretch at stairs - 3 x 30 second hold each  Ideal stretch  3 x 30 second hold     (Not Today)  Seated leg curl 4 plates 3 x 10  Propped extension stretch w/ moist hot pack   Prone hang  w/ moist hot pack   Extension stretch 4 plates 3 x 30sh  supine heel slides w/ belt to pull into flexion 10 x 15 second hold     manual therapy techniques: Joint mobilizations, Manual traction, Myofacial release, Manual Lymphatic  "Drainage, Soft tissue Mobilization, and Friction Massage were applied to the: right leg for 12 minutes, including:  LLLD with extension board x 8 min with HP and tightening every 2 minutes  Overpressure into extension in long sitting    Scar massage, patellar mobs, and MFR to HS in prone hang position.       neuromuscular re-education activities to improve: Balance, Coordination, Kinesthetic Sense, Proprioception, and Posture for 20 minutes. The following activities were included:  Quad set on slant board 10 x 10 second hold    Straight leg raise 2 x 10  Short arc quad 5sh x 30  Bilateral leg press 8 plates x 30 w/ heel rock for terminal knee extension   Unilateral leg press 4 plates x 30 w/ heel rock for terminal knee extension       (Not Today)  Seated leg extension 1 plate x 30 up bilateral down unilateral   Prone quad sets x 20  Closed chain terminal knee extension 3 plates x 30    therapeutic activities to improve functional performance for 0 minutes, including:  -    (Not Today)  Step ups 6" forward x 30    gait training to improve functional mobility and safety for  0  billable minutes, including:  -    (Not Today)  In parallel bars with focus on heel strike and toe off in forward and retro (10 minutes total)    supervised modalities after being cleared for contradictions: NMES Electrical Stimulation:  Angela received NMES Electrical Stimulation to elicit muscle contraction of the right quad. Pt received stimulation at a rate of 80 pps with symmetric current, ramp of 3 seconds with 10 second on time and 20 second off time. Patient tolerated treatment well without any adverse effects. 0 min... 8 min QS QS in propped extension with HP and 5# weight.      Patient Education and Home Exercises       Education provided:   - review of home exercise program and current Plan of Care/rationale of treatment.    Written Home Exercises Provided: Patient instructed to cont prior HEP. Exercises were reviewed and Angela was able " to demonstrate them prior to the end of the session.  Angela demonstrated good understanding of the education provided. See EMR under Patient Instructions for exercises provided during therapy sessions    Assessment     At Evaluation:  Angela is a 63 y.o. female referred to outpatient Physical Therapy with a medical diagnosis of s/p right total knee replacement . Patient presents with typical postop symptoms of swelling, pain, decreased range of motion, quad weakness, and gait disturbance. She is currently off work. To return to work she will be required to do a lot of walking, lifting garbage, sweeping and mopping and carrying food trays or push food carts.     Current Assessment:  Angela arrived with -18 degrees extension and left with -10. Used extension board with moist hot pack again today at tx start. She remains unable to fire quads without hamstrings and doesn't get a good contraction even with electrical muscle stimulation. Re-emphasized importance of stretching extension multiple times a day. Informed her although walking is good exercise in general, it will not help her gain extension range of motion. Will continue to address functional needs as able.     Angela Is progressing towards her goals.   Pt prognosis is Good.     Pt will continue to benefit from skilled outpatient physical therapy to address the deficits listed in the problem list box on initial evaluation, provide pt/family education and to maximize pt's level of independence in the home and community environment.     Pt's spiritual, cultural and educational needs considered and pt agreeable to plan of care and goals.     Anticipated barriers to physical therapy: none    Goals:   SHORT TERM GOALS - 4 weeks  1.  Patient to be independent with home exercise program to facilitate carryover between therapy visits.  2.  Patient will have  degrees range of motion right knee for improved mobiilty.  3.  Patient will perform straight leg raise without  quad lag increasing from resting for increased quad control.  4.  Patient will increase manual muscle test of right lower extremity to 4+ to 5/5 for increased stability with gait and activiites of daily living.  5.  Patient will be able to get in and out of the shower independently.     LONG TERM GOALS - 8 weeks  1.  Patient will go up/down stairs reciprocal pattern without handrails and good eccentric control on right lower extremity.  2.  Patient will ambulate independent without cane, without deviation and without pain.  3.  Patient will be able to get in and out of the bathtub independently to be able to take a tub bath.  4.  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     Plan     Plan of care Certification: 4/4/2024 to 5/31/2024.     Outpatient Physical Therapy 2 times weekly for 8 weeks to include the following interventions: Electrical Stimulation NMES, Gait Training, Manual Therapy, Moist Heat/ Ice, Neuromuscular Re-ed, Patient Education, Therapeutic Activities, Therapeutic Exercise, and Biofeedback and Vasopneumatic .     ANTONIO TRIANA, PT   05/15/2024

## 2024-05-17 ENCOUNTER — PATIENT MESSAGE (OUTPATIENT)
Dept: REHABILITATION | Facility: HOSPITAL | Age: 64
End: 2024-05-17
Payer: COMMERCIAL

## 2024-05-20 ENCOUNTER — TELEPHONE (OUTPATIENT)
Dept: ORTHOPEDICS | Facility: CLINIC | Age: 64
End: 2024-05-20
Payer: COMMERCIAL

## 2024-05-20 DIAGNOSIS — Z96.651 STATUS POST TOTAL RIGHT KNEE REPLACEMENT: Primary | ICD-10-CM

## 2024-05-20 RX ORDER — TRAMADOL HYDROCHLORIDE 50 MG/1
50 TABLET ORAL EVERY 8 HOURS PRN
Qty: 20 TABLET | Refills: 0 | Status: SHIPPED | OUTPATIENT
Start: 2024-05-20

## 2024-05-20 NOTE — TELEPHONE ENCOUNTER
----- Message from Jennifer Delgadillo sent at 5/18/2024  2:51 PM CDT -----  Type:  Patient Requesting Referral    Who Called:BRENDON ANDREWS [[id number]]  Does the patient already have the specialty appointment scheduled?:NO   Referral to What Specialty:PHYSICAL THERAPY   Reason for Referral:right knee replacement   Does the patient want the referral with a specific physician?:no  [facility]   Patient Requesting a Response?:yes  Would the patient rather a call back or a response via [facility]? Call back   Best Call Back Number: [phone]  Additional Information: Patient states she was contacted by her PT tech and advised that she reach out to see if possible to extend her Physical therapy referral. Patient states she was told that she is not where she needs to be currently and should continue to see Physical therapy patient states she would like to speak with the providers office with further assistance and more information.

## 2024-05-21 ENCOUNTER — CLINICAL SUPPORT (OUTPATIENT)
Dept: REHABILITATION | Facility: HOSPITAL | Age: 64
End: 2024-05-21
Attending: NURSE PRACTITIONER
Payer: COMMERCIAL

## 2024-05-21 DIAGNOSIS — Z96.651 STATUS POST TOTAL RIGHT KNEE REPLACEMENT: ICD-10-CM

## 2024-05-21 DIAGNOSIS — M25.661 DECREASED ROM OF RIGHT KNEE: Primary | ICD-10-CM

## 2024-05-21 DIAGNOSIS — M62.81 QUADRICEPS WEAKNESS: ICD-10-CM

## 2024-05-21 DIAGNOSIS — R26.9 GAIT DISTURBANCE: ICD-10-CM

## 2024-05-21 PROCEDURE — 97140 MANUAL THERAPY 1/> REGIONS: CPT | Mod: CQ

## 2024-05-21 PROCEDURE — 97112 NEUROMUSCULAR REEDUCATION: CPT | Mod: CQ

## 2024-05-21 PROCEDURE — 97110 THERAPEUTIC EXERCISES: CPT | Mod: CQ

## 2024-05-21 NOTE — PROGRESS NOTES
[facility]   Physical Therapy Treatment Note      Name: Angela Landaverde  Clinic Number: [id number]    Therapy Diagnosis:        Encounter Diagnoses   Name Primary?    Status post total right knee replacement Yes    Decreased ROM of right knee      Quadriceps weakness      Gait disturbance        Physician: Ernestina Ramirez FNP    Visit Date: 5/21/2024    Physician Orders: PT Eval and Treat   Medical Diagnosis from Referral: see above  Evaluation Date: 4/4/2024  Authorization Period Expiration: 6/30/2024   Plan of Care Expiration: 5/31/2024     Date of Surgery: 3/11/2024  Visit # / Visits authorized: 14/17    FOTO: 2/ 3 (40/54)     Precautions: Standard    PTA Visit #: 1/5     Time In: 2:34 pm  Time Out: 3:35 pm  Total Billable Time:  54 billable minutes     Subjective     Pt reports:  having a little pain today  She was compliant with home exercise program.  Response to previous treatment: no complaints   Functional change: no change noted    Pain: 5/10  Location: right knee      Objective      Objective Measures updated at progress report unless specified.     -10 degrees extension on arrival,  no lag from there, 80 degrees flexion   -10 degrees after Pamplico stretch    Treatment     Angela received the treatments listed below:      therapeutic exercises to develop strength, endurance, ROM, flexibility, posture, and core stabilization for 18 minutes including:  NuStep x 5 minutes    Slant board x 2 minutes  Knee flexion and extension stretch at stairs   Pamplico stretch  10 x 10 second hold   Seated leg curl 5 plates 3 x 10    manual therapy techniques: Joint mobilizations, Manual traction, Myofacial release, Manual Lymphatic Drainage, Soft tissue Mobilization, and Friction Massage were applied to the: right leg for 12 minutes, including:  Overpressure into extension in long sitting, scar massage    Scar massage, patellar mobs, and MFR to HS in prone hang position.       neuromuscular  "re-education activities to improve: Balance, Coordination, Kinesthetic Sense, Proprioception, and Posture for 24 minutes. The following activities were included:  Quad set on slant board 10 x 10 second hold    Straight leg raise 2 x 10  Short arc quad 5sh x 30  Bilateral leg press 8 plates 3 x 15 w/ heel rock for terminal knee extension   Unilateral leg press 4 plates x 30 w/ heel rock for terminal knee extension       (Not Today)  Seated leg extension 1 plate x 30 up bilateral down unilateral   Prone quad sets x 20  Closed chain terminal knee extension 3 plates x 30    therapeutic activities to improve functional performance for 0 minutes, including:  -    (Not Today)  Step ups 6" forward x 30    gait training to improve functional mobility and safety for  0  billable minutes, including:  -    (Not Today)  In parallel bars with focus on heel strike and toe off in forward and retro (10 minutes total)    supervised modalities after being cleared for contradictions: NMES Electrical Stimulation:  Angela received NMES Electrical Stimulation to elicit muscle contraction of the right quad. Pt received stimulation at a rate of 80 pps with symmetric current, ramp of 3 seconds with 10 second on time and 20 second off time. Patient tolerated treatment well without any adverse effects. 0 min... 8 min QS QS in propped extension with HP and 5# weight.      Patient Education and Home Exercises       Education provided:   - review of home exercise program and current Plan of Care/rationale of treatment.    Written Home Exercises Provided: Patient instructed to cont prior HEP. Exercises were reviewed and Angela was able to demonstrate them prior to the end of the session.  Angela demonstrated good understanding of the education provided. See EMR under Patient Instructions for exercises provided during therapy sessions    Assessment     At Evaluation:  Angela is a 63 y.o. female referred to outpatient Physical Therapy with a medical " diagnosis of s/p right total knee replacement . Patient presents with typical postop symptoms of swelling, pain, decreased range of motion, quad weakness, and gait disturbance. She is currently off work. To return to work she will be required to do a lot of walking, lifting garbage, sweeping and mopping and carrying food trays or push food carts.     Current Assessment:  Angela arrived with -10 degrees extension today and left with -10 which is equal to her opposite leg. She was able to perform voluntary quad set and maintain -10 degrees extension with straight leg raise. She received some myofascial release along scar and quads to decrease tightness/hypertrophy.  Will continue to address functional needs as able.     Angela Is progressing towards her goals.   Pt prognosis is Good.     Pt will continue to benefit from skilled outpatient physical therapy to address the deficits listed in the problem list box on initial evaluation, provide pt/family education and to maximize pt's level of independence in the home and community environment.     Pt's spiritual, cultural and educational needs considered and pt agreeable to plan of care and goals.     Anticipated barriers to physical therapy: none    Goals:   SHORT TERM GOALS - 4 weeks  1.  Patient to be independent with home exercise program to facilitate carryover between therapy visits.  2.  Patient will have  degrees range of motion right knee for improved mobiilty.  3.  Patient will perform straight leg raise without quad lag increasing from resting for increased quad control.  4.  Patient will increase manual muscle test of right lower extremity to 4+ to 5/5 for increased stability with gait and activiites of daily living.  5.  Patient will be able to get in and out of the shower independently.     LONG TERM GOALS - 8 weeks  1.  Patient will go up/down stairs reciprocal pattern without handrails and good eccentric control on right lower extremity.  2.  Patient  will ambulate independent without cane, without deviation and without pain.  3.  Patient will be able to get in and out of the bathtub independently to be able to take a tub bath.  4.  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     Plan     Plan of care Certification: 4/4/2024 to 5/31/2024.     Outpatient Physical Therapy 2 times weekly for 8 weeks to include the following interventions: Electrical Stimulation NMES, Gait Training, Manual Therapy, Moist Heat/ Ice, Neuromuscular Re-ed, Patient Education, Therapeutic Activities, Therapeutic Exercise, and Biofeedback and Vasopneumatic .     Norma San, PTA   05/21/2024

## 2024-05-22 DIAGNOSIS — Z96.651 STATUS POST TOTAL RIGHT KNEE REPLACEMENT: Primary | ICD-10-CM

## 2024-05-23 ENCOUNTER — HOSPITAL ENCOUNTER (OUTPATIENT)
Dept: RADIOLOGY | Facility: HOSPITAL | Age: 64
Discharge: HOME | End: 2024-05-23
Attending: ORTHOPAEDIC SURGERY
Payer: COMMERCIAL

## 2024-05-23 ENCOUNTER — OFFICE VISIT (OUTPATIENT)
Dept: ORTHOPEDICS | Facility: CLINIC | Age: 64
End: 2024-05-23
Payer: COMMERCIAL

## 2024-05-23 DIAGNOSIS — Z96.651 STATUS POST TOTAL RIGHT KNEE REPLACEMENT: Primary | ICD-10-CM

## 2024-05-23 DIAGNOSIS — Z96.651 STATUS POST TOTAL RIGHT KNEE REPLACEMENT: ICD-10-CM

## 2024-05-23 PROCEDURE — 73562 X-RAY EXAM OF KNEE 3: CPT | Mod: TC,RT

## 2024-05-23 PROCEDURE — 99024 POSTOP FOLLOW-UP VISIT: CPT | Mod: ,,, | Performed by: ORTHOPAEDIC SURGERY

## 2024-05-23 PROCEDURE — 1159F MED LIST DOCD IN RCRD: CPT | Mod: CPTII,,, | Performed by: ORTHOPAEDIC SURGERY

## 2024-05-23 PROCEDURE — 73562 X-RAY EXAM OF KNEE 3: CPT | Mod: 26,RT,, | Performed by: ORTHOPAEDIC SURGERY

## 2024-05-23 PROCEDURE — 99215 OFFICE O/P EST HI 40 MIN: CPT | Mod: PBBFAC,25 | Performed by: ORTHOPAEDIC SURGERY

## 2024-05-23 RX ORDER — CEFAZOLIN SODIUM 2 G/50ML
2 SOLUTION INTRAVENOUS
Status: CANCELLED | OUTPATIENT
Start: 2024-05-23

## 2024-05-23 RX ORDER — SODIUM CHLORIDE 9 MG/ML
INJECTION, SOLUTION INTRAVENOUS CONTINUOUS
Status: CANCELLED | OUTPATIENT
Start: 2024-05-23

## 2024-05-23 RX ORDER — MUPIROCIN 20 MG/G
OINTMENT TOPICAL
Status: CANCELLED | OUTPATIENT
Start: 2024-05-23

## 2024-05-23 NOTE — PROGRESS NOTES
Post-Operative Total Knee        Robotic Arthroplasty, Knee, Total - Right 3/11/2024      Pt is here today for Second post-operative followup of her Robotic Arthroplasty, Knee, Total - Right.  she is doing well and is continuing therapy here at [facility]. She is still limited in her extension. She does not feel that she can go back to work yet at full duty  We have reviewed her findings and discussed plan of care and future treatment options, including the physical therapy plan.        Physical Exam:     The affected knee was inspected today.   The wound is healing well with no signs of erythema or warmth.  There is no drainage.  No clinical signs or symptoms of infection are present.   ROM: 8-90  -Daniela's sign.  2+ pulses are present.     X-Ray Knee 3 View Right    Result Date: 5/23/2024  See Procedure Notes for results. IMPRESSION: Please see Ortho procedure notes for report.  This procedure was auto-finalized by: Virtual Radiologist   3 Radiographs of the right knee were obtained today.  The prosthesis appears to be in excellent overall alignment.  No evidence of loosening or fracture is demonstrated.  Components appear to be appropriately positioned with no adverse interval change.       Assessment and Plan  1. Status post total right knee replacement          She will benefit a manipulation under anesthesia.  Motion appears be quite limited.  Try to set this up as soon as possible.  Understands that this is necessary in order to improve her motion quickly into facilitate a quick return to work.  There are no Patient Instructions on file for this visit.

## 2024-05-23 NOTE — LETTER
May 23, 2024      [facility]  [address] [place] [id number]  Phone: [phone]  Fax: [phone]       Patient: Angela Landaverde   YOB: 1960  Date of Visit: 05/23/2024    To Whom It May Concern:    Alejandro Landaverde  was at [facility] on 05/23/2024. The patient may remain off work at this time. She is scheduled for a procedure on 05-24/2024. She will continue therapy at this time. If you have any questions or concerns, or if I can be of further assistance, please do not hesitate to contact me.    Sincerely,    MD Smiley Rousseau RN

## 2024-05-23 NOTE — PATIENT INSTRUCTIONS
Your surgery is scheduled for 05/24/2024 at [facility] in [place].    Your arrival time for surgery is 6:30 AM.    Do not eat or drink anything after midnight the night before surgery (this includes gum, candy, chewing tobacco, etc).  Bring all medication in their original bottles.  The morning of your surgery ONLY take blood pressure, heart, acid reflux, or thyroid (if you take a morning dose) medication.  Take these medications with a sip of water.   Be sure to have stopped your blood thinner medication at the appropriate time, as instructed.  Bring your C-Pap machine if you have one.  All jewelry, piercings, or false eyelashes MUST be removed prior to surgery.

## 2024-05-24 ENCOUNTER — ANESTHESIA (OUTPATIENT)
Dept: SURGERY | Facility: HOSPITAL | Age: 64
End: 2024-05-24
Payer: COMMERCIAL

## 2024-05-24 ENCOUNTER — HOSPITAL ENCOUNTER (OUTPATIENT)
Facility: HOSPITAL | Age: 64
Discharge: HOME | End: 2024-05-24
Attending: ORTHOPAEDIC SURGERY | Admitting: ORTHOPAEDIC SURGERY
Payer: COMMERCIAL

## 2024-05-24 ENCOUNTER — ANESTHESIA EVENT (OUTPATIENT)
Dept: SURGERY | Facility: HOSPITAL | Age: 64
End: 2024-05-24
Payer: COMMERCIAL

## 2024-05-24 VITALS
HEART RATE: 47 BPM | OXYGEN SATURATION: 100 % | BODY MASS INDEX: 41.91 KG/M2 | HEIGHT: 67 IN | WEIGHT: 267 LBS | RESPIRATION RATE: 14 BRPM | SYSTOLIC BLOOD PRESSURE: 146 MMHG | TEMPERATURE: 99 F | DIASTOLIC BLOOD PRESSURE: 63 MMHG

## 2024-05-24 DIAGNOSIS — Z96.651 STATUS POST TOTAL RIGHT KNEE REPLACEMENT: Primary | ICD-10-CM

## 2024-05-24 PROCEDURE — 63600175 PHARM REV CODE 636 W HCPCS: Performed by: NURSE ANESTHETIST, CERTIFIED REGISTERED

## 2024-05-24 PROCEDURE — 27000655: Performed by: ANESTHESIOLOGY

## 2024-05-24 PROCEDURE — 63600175 PHARM REV CODE 636 W HCPCS: Performed by: ANESTHESIOLOGY

## 2024-05-24 PROCEDURE — 27570 FIXATION OF KNEE JOINT: CPT | Mod: 78,RT,, | Performed by: ORTHOPAEDIC SURGERY

## 2024-05-24 PROCEDURE — 97161 PT EVAL LOW COMPLEX 20 MIN: CPT

## 2024-05-24 PROCEDURE — 36000705 HC OR TIME LEV I EA ADD 15 MIN: Performed by: ORTHOPAEDIC SURGERY

## 2024-05-24 PROCEDURE — 25000003 PHARM REV CODE 250: Performed by: NURSE ANESTHETIST, CERTIFIED REGISTERED

## 2024-05-24 PROCEDURE — D9220A PRA ANESTHESIA: Mod: CRNA,,, | Performed by: NURSE ANESTHETIST, CERTIFIED REGISTERED

## 2024-05-24 PROCEDURE — 71000016 HC POSTOP RECOV ADDL HR: Performed by: ORTHOPAEDIC SURGERY

## 2024-05-24 PROCEDURE — 64447 NJX AA&/STRD FEMORAL NRV IMG: CPT | Mod: 59,RT,, | Performed by: ANESTHESIOLOGY

## 2024-05-24 PROCEDURE — 37000008 HC ANESTHESIA 1ST 15 MINUTES: Performed by: ORTHOPAEDIC SURGERY

## 2024-05-24 PROCEDURE — 71000033 HC RECOVERY, INTIAL HOUR: Performed by: ORTHOPAEDIC SURGERY

## 2024-05-24 PROCEDURE — 37000009 HC ANESTHESIA EA ADD 15 MINS: Performed by: ORTHOPAEDIC SURGERY

## 2024-05-24 PROCEDURE — 27000716 HC OXISENSOR PROBE, ANY SIZE: Performed by: ANESTHESIOLOGY

## 2024-05-24 PROCEDURE — 25000003 PHARM REV CODE 250: Performed by: ORTHOPAEDIC SURGERY

## 2024-05-24 PROCEDURE — 71000015 HC POSTOP RECOV 1ST HR: Performed by: ORTHOPAEDIC SURGERY

## 2024-05-24 PROCEDURE — 27000177 HC AIRWAY, LARYNGEAL MASK: Performed by: ANESTHESIOLOGY

## 2024-05-24 PROCEDURE — D9220A PRA ANESTHESIA: Mod: ANES,,, | Performed by: ANESTHESIOLOGY

## 2024-05-24 PROCEDURE — 36000704 HC OR TIME LEV I 1ST 15 MIN: Performed by: ORTHOPAEDIC SURGERY

## 2024-05-24 RX ORDER — KETOROLAC TROMETHAMINE 30 MG/ML
INJECTION, SOLUTION INTRAMUSCULAR; INTRAVENOUS
Status: DISCONTINUED | OUTPATIENT
Start: 2024-05-24 | End: 2024-05-24

## 2024-05-24 RX ORDER — SODIUM CHLORIDE 9 MG/ML
INJECTION, SOLUTION INTRAVENOUS CONTINUOUS
Status: DISCONTINUED | OUTPATIENT
Start: 2024-05-24 | End: 2024-05-24 | Stop reason: HOSPADM

## 2024-05-24 RX ORDER — DEXAMETHASONE SODIUM PHOSPHATE 4 MG/ML
INJECTION, SOLUTION INTRA-ARTICULAR; INTRALESIONAL; INTRAMUSCULAR; INTRAVENOUS; SOFT TISSUE
Status: DISCONTINUED | OUTPATIENT
Start: 2024-05-24 | End: 2024-05-24

## 2024-05-24 RX ORDER — HYDROMORPHONE HYDROCHLORIDE 2 MG/ML
0.5 INJECTION, SOLUTION INTRAMUSCULAR; INTRAVENOUS; SUBCUTANEOUS EVERY 5 MIN PRN
Status: COMPLETED | OUTPATIENT
Start: 2024-05-24 | End: 2024-05-24

## 2024-05-24 RX ORDER — OXYCODONE AND ACETAMINOPHEN 10; 325 MG/1; MG/1
1 TABLET ORAL EVERY 6 HOURS PRN
Qty: 30 TABLET | Refills: 0 | Status: SHIPPED | OUTPATIENT
Start: 2024-05-24

## 2024-05-24 RX ORDER — DIPHENHYDRAMINE HYDROCHLORIDE 50 MG/ML
25 INJECTION INTRAMUSCULAR; INTRAVENOUS EVERY 6 HOURS PRN
Status: DISCONTINUED | OUTPATIENT
Start: 2024-05-24 | End: 2024-05-24 | Stop reason: HOSPADM

## 2024-05-24 RX ORDER — LIDOCAINE HYDROCHLORIDE 20 MG/ML
INJECTION, SOLUTION EPIDURAL; INFILTRATION; INTRACAUDAL; PERINEURAL
Status: DISCONTINUED | OUTPATIENT
Start: 2024-05-24 | End: 2024-05-24

## 2024-05-24 RX ORDER — ONDANSETRON 4 MG/1
4 TABLET, ORALLY DISINTEGRATING ORAL EVERY 6 HOURS PRN
Qty: 30 TABLET | Refills: 0 | Status: SHIPPED | OUTPATIENT
Start: 2024-05-24

## 2024-05-24 RX ORDER — SODIUM CHLORIDE, SODIUM LACTATE, POTASSIUM CHLORIDE, CALCIUM CHLORIDE 600; 310; 30; 20 MG/100ML; MG/100ML; MG/100ML; MG/100ML
125 INJECTION, SOLUTION INTRAVENOUS CONTINUOUS
Status: DISCONTINUED | OUTPATIENT
Start: 2024-05-24 | End: 2024-05-24 | Stop reason: HOSPADM

## 2024-05-24 RX ORDER — MORPHINE SULFATE 10 MG/ML
4 INJECTION INTRAMUSCULAR; INTRAVENOUS; SUBCUTANEOUS EVERY 5 MIN PRN
Status: DISCONTINUED | OUTPATIENT
Start: 2024-05-24 | End: 2024-05-24 | Stop reason: HOSPADM

## 2024-05-24 RX ORDER — MEPERIDINE HYDROCHLORIDE 25 MG/ML
25 INJECTION INTRAMUSCULAR; INTRAVENOUS; SUBCUTANEOUS EVERY 10 MIN PRN
Status: DISCONTINUED | OUTPATIENT
Start: 2024-05-24 | End: 2024-05-24 | Stop reason: HOSPADM

## 2024-05-24 RX ORDER — MUPIROCIN 20 MG/G
OINTMENT TOPICAL
Status: DISCONTINUED | OUTPATIENT
Start: 2024-05-24 | End: 2024-05-24 | Stop reason: HOSPADM

## 2024-05-24 RX ORDER — ACETAMINOPHEN 10 MG/ML
1000 INJECTION, SOLUTION INTRAVENOUS ONCE
Status: COMPLETED | OUTPATIENT
Start: 2024-05-24 | End: 2024-05-24

## 2024-05-24 RX ORDER — ONDANSETRON 4 MG/1
8 TABLET, ORALLY DISINTEGRATING ORAL EVERY 8 HOURS PRN
Status: DISCONTINUED | OUTPATIENT
Start: 2024-05-24 | End: 2024-05-24 | Stop reason: HOSPADM

## 2024-05-24 RX ORDER — SODIUM CHLORIDE, SODIUM LACTATE, POTASSIUM CHLORIDE, CALCIUM CHLORIDE 600; 310; 30; 20 MG/100ML; MG/100ML; MG/100ML; MG/100ML
INJECTION, SOLUTION INTRAVENOUS CONTINUOUS
Status: DISCONTINUED | OUTPATIENT
Start: 2024-05-24 | End: 2024-05-24 | Stop reason: HOSPADM

## 2024-05-24 RX ORDER — OXYCODONE AND ACETAMINOPHEN 5; 325 MG/1; MG/1
1 TABLET ORAL EVERY 4 HOURS PRN
Status: DISCONTINUED | OUTPATIENT
Start: 2024-05-24 | End: 2024-05-24 | Stop reason: HOSPADM

## 2024-05-24 RX ORDER — FENTANYL CITRATE 50 UG/ML
INJECTION, SOLUTION INTRAMUSCULAR; INTRAVENOUS
Status: DISCONTINUED | OUTPATIENT
Start: 2024-05-24 | End: 2024-05-24

## 2024-05-24 RX ORDER — ONDANSETRON HYDROCHLORIDE 2 MG/ML
INJECTION, SOLUTION INTRAVENOUS
Status: DISCONTINUED | OUTPATIENT
Start: 2024-05-24 | End: 2024-05-24

## 2024-05-24 RX ORDER — LIDOCAINE HYDROCHLORIDE 10 MG/ML
1 INJECTION INFILTRATION; PERINEURAL ONCE
Status: DISCONTINUED | OUTPATIENT
Start: 2024-05-24 | End: 2024-05-24 | Stop reason: HOSPADM

## 2024-05-24 RX ORDER — PROPOFOL 10 MG/ML
VIAL (ML) INTRAVENOUS
Status: DISCONTINUED | OUTPATIENT
Start: 2024-05-24 | End: 2024-05-24

## 2024-05-24 RX ORDER — OXYCODONE HYDROCHLORIDE 5 MG/1
5 TABLET ORAL
Status: DISCONTINUED | OUTPATIENT
Start: 2024-05-24 | End: 2024-05-24 | Stop reason: HOSPADM

## 2024-05-24 RX ORDER — ONDANSETRON HYDROCHLORIDE 2 MG/ML
4 INJECTION, SOLUTION INTRAVENOUS DAILY PRN
Status: DISCONTINUED | OUTPATIENT
Start: 2024-05-24 | End: 2024-05-24 | Stop reason: HOSPADM

## 2024-05-24 RX ORDER — ROPIVACAINE HYDROCHLORIDE 7.5 MG/ML
INJECTION, SOLUTION EPIDURAL; PERINEURAL
Status: COMPLETED | OUTPATIENT
Start: 2024-05-24 | End: 2024-05-24

## 2024-05-24 RX ORDER — CELECOXIB 200 MG/1
200 CAPSULE ORAL 2 TIMES DAILY
Qty: 60 CAPSULE | Refills: 2 | Status: SHIPPED | OUTPATIENT
Start: 2024-05-24

## 2024-05-24 RX ORDER — OXYCODONE HYDROCHLORIDE 5 MG/1
10 TABLET ORAL EVERY 4 HOURS PRN
Status: DISCONTINUED | OUTPATIENT
Start: 2024-05-24 | End: 2024-05-24 | Stop reason: HOSPADM

## 2024-05-24 RX ORDER — MUPIROCIN 20 MG/G
OINTMENT TOPICAL 2 TIMES DAILY
Status: DISCONTINUED | OUTPATIENT
Start: 2024-05-24 | End: 2024-05-24 | Stop reason: HOSPADM

## 2024-05-24 RX ADMIN — HYDROMORPHONE HYDROCHLORIDE 0.5 MG: 2 INJECTION, SOLUTION INTRAMUSCULAR; INTRAVENOUS; SUBCUTANEOUS at 09:05

## 2024-05-24 RX ADMIN — DEXAMETHASONE SODIUM PHOSPHATE 8 MG: 4 INJECTION, SOLUTION INTRA-ARTICULAR; INTRALESIONAL; INTRAMUSCULAR; INTRAVENOUS; SOFT TISSUE at 08:05

## 2024-05-24 RX ADMIN — SODIUM CHLORIDE: 9 INJECTION, SOLUTION INTRAVENOUS at 08:05

## 2024-05-24 RX ADMIN — ACETAMINOPHEN 1000 MG: 10 INJECTION, SOLUTION INTRAVENOUS at 09:05

## 2024-05-24 RX ADMIN — FENTANYL CITRATE 100 MCG: 50 INJECTION INTRAMUSCULAR; INTRAVENOUS at 08:05

## 2024-05-24 RX ADMIN — PROPOFOL 150 MG: 10 INJECTION, EMULSION INTRAVENOUS at 08:05

## 2024-05-24 RX ADMIN — LIDOCAINE HYDROCHLORIDE 100 MG: 20 INJECTION, SOLUTION INTRAVENOUS at 08:05

## 2024-05-24 RX ADMIN — ONDANSETRON 4 MG: 2 INJECTION INTRAMUSCULAR; INTRAVENOUS at 08:05

## 2024-05-24 RX ADMIN — KETOROLAC TROMETHAMINE 60 MG: 30 INJECTION, SOLUTION INTRAMUSCULAR at 08:05

## 2024-05-24 RX ADMIN — DEXAMETHASONE SODIUM PHOSPHATE 4 MG: 4 INJECTION, SOLUTION INTRA-ARTICULAR; INTRALESIONAL; INTRAMUSCULAR; INTRAVENOUS; SOFT TISSUE at 08:05

## 2024-05-24 RX ADMIN — ROPIVACAINE HYDROCHLORIDE 30 ML: 7.5 INJECTION, SOLUTION EPIDURAL; PERINEURAL at 08:05

## 2024-05-24 NOTE — ANESTHESIA PREPROCEDURE EVALUATION
05/24/2024  Angela Landaverde is a 63 y.o., female.      Pre-op Assessment    I have reviewed the Patient Summary Reports.     I have reviewed the Nursing Notes. I have reviewed the NPO Status.   I have reviewed the Medications.     Review of Systems  Anesthesia Hx:  No problems with previous Anesthesia                Social:  Non-Smoker, No Alcohol Use       Hematology/Oncology:  Hematology Normal   Oncology Normal                                   EENT/Dental:  EENT/Dental Normal           Cardiovascular:     Hypertension    Dysrhythmias           Hx bradycardia                         Pulmonary:  Pulmonary Normal                       Renal/:  Renal/ Normal                 Hepatic/GI:  Hepatic/GI Normal                 Musculoskeletal:  Arthritis               Neurological:  Neurology Normal                                      Endocrine:   Hypothyroidism        Obesity / BMI > 30  Dermatological:  Skin Normal    Psych:  Psychiatric Normal                    Physical Exam  General: Well nourished    Airway:  Mallampati: III / III  Mouth Opening: Normal  TM Distance: > 6 cm  Tongue: Normal  Neck ROM: Normal ROM    Chest/Lungs:  Clear to auscultation, Normal Respiratory Rate    Heart:  Rate: Normal  Rhythm: Regular Rhythm        Anesthesia Plan  Type of Anesthesia, risks & benefits discussed:    Anesthesia Type: Regional, Gen Supraglottic Airway  Intra-op Monitoring Plan: Standard ASA Monitors  Post Op Pain Control Plan: multimodal analgesia  Induction:  IV  Informed Consent: Informed consent signed with the Patient and all parties understand the risks and agree with anesthesia plan.  All questions answered.   ASA Score: 2  Day of Surgery Review of History & Physical: H&P Update referred to the surgeon/provider.I have interviewed and examined the patient. I have reviewed the patient's H&P dated: There are no  significant changes.     Ready For Surgery From Anesthesia Perspective.     .       no

## 2024-05-24 NOTE — ANESTHESIA PROCEDURE NOTES
Peripheral Block    Patient location during procedure: OR   Block not for primary anesthetic.  Reason for block: at surgeon's request and post-op pain management   Post-op Pain Location: rt knee pain, p op   Start time: 5/24/2024 8:22 AM  Timeout: 5/24/2024 8:21 AM   End time: 5/24/2024 8:24 AM    Staffing  Authorizing Provider: Chad Frazier MD  Performing Provider: Chad Frazier MD    Staffing  Performed by: Chad Frazier MD  Authorized by: Chad Frazier MD    Preanesthetic Checklist  Completed: patient identified, IV checked, site marked, risks and benefits discussed, surgical consent, monitors and equipment checked, pre-op evaluation and timeout performed  Peripheral Block  Patient position: supine  Prep: ChloraPrep  Patient monitoring: heart rate, cardiac monitor, continuous pulse ox, continuous capnometry and frequent blood pressure checks  Block type: adductor canal  Laterality: right  Injection technique: single shot  Needle  Needle type: Tuohy   Needle gauge: 20 G  Needle length: 4 in  Needle localization: ultrasound guidance   -ultrasound image captured on disc.  Assessment  Injection assessment: negative aspiration, negative parasthesia and local visualized surrounding nerve  Paresthesia pain: none  Heart rate change: no  Slow fractionated injection: yes  Pain Tolerance: comfortable throughout block and no complaints  Medications:    Medications: ROPIvacaine (NAROPIN) injection 0.75% - Perineural   30 mL - 5/24/2024 8:23:00 AM

## 2024-05-24 NOTE — PT/OT/SLP EVAL
Physical Therapy Evaluation and Discharge Note    Patient Name:  Angela aLndaverde   MRN:  [id number]    Recommendations:     Discharge Recommendations: Low Intensity Therapy  Discharge Equipment Recommendations: none   Barriers to discharge: None    Assessment:     Angela Landaverde is a 63 y.o. female admitted with a medical diagnosis of right knee manipulation. Pt educated on home exercise program, No PT scheduled until next week .  At this time, patient is functioning at their prior level of function and does not require further acute PT services.     Recent Surgery: Procedure(s) (LRB):  MANIPULATION, KNEE (Right) Day of Surgery    Plan:     During this hospitalization, patient does not require further acute PT services.  Please re-consult if situation changes.      Subjective     Chief Complaint: right knee pain  Patient/Family Comments/goals: Pt is agreeable to PT   Pain/Comfort:  Pain Rating 1: 4/10  Location - Side 1: Right  Location 1: knee  Pain Addressed 1: Pre-medicate for activity  Pain Rating Post-Intervention 1: 4/10    Patients cultural, spiritual, Druze conflicts given the current situation: no    Living Environment:  Pt lives at home with family  Prior to admission, patients level of function was independent.  Equipment used at home: cane, straight.  DME owned (not currently used): rolling walker.  Upon discharge, patient will have assistance from family.    Objective:     Communicated with RN prior to session.  Patient found HOB elevated with peripheral IV upon PT entry to room.    General Precautions: Standard, fall    Orthopedic Precautions:RLE weight bearing as tolerated   Braces: N/A  Respiratory Status: Room air    Exams:  Cognitive Exam:  Patient is oriented to Person, Place, Time, and Situation  Gross Motor Coordination:  WFL    Functional Mobility:  N/a    AM-PAC 6 CLICK MOBILITY  Total Score:22       Treatment and Education:  Educated on home exercise program, ROM    AM-PAC 6 CLICK  MOBILITY  Total Score:22     Patient left supine with all lines intact and call button in reach.    GOALS:   Multidisciplinary Problems       Physical Therapy Goals          Problem: Physical Therapy    Goal Priority Disciplines Outcome Goal Variances Interventions   Physical Therapy Goal     PT, PT/OT Progressing     Description: Pt will demo full knee ROM  Pt will be independent in home exercise program                        History:     Past Medical History:   Diagnosis Date    Arthritis     Hypertension     Thyroid disease        Past Surgical History:   Procedure Laterality Date     SECTION      ROBOTIC ARTHROPLASTY, KNEE Right 3/11/2024    Procedure: ROBOTIC ARTHROPLASTY, KNEE, TOTAL;  Surgeon: Mark Pemberton MD;  Location: [facility];  Service: Orthopedics;  Laterality: Right;    TUBAL LIGATION         Time Tracking:     PT Received On: 24  PT Start Time: 1013     PT Stop Time: 1024  PT Total Time (min): 11 min     Billable Minutes: Evaluation low complexity      2024

## 2024-05-24 NOTE — ANESTHESIA PROCEDURE NOTES
Intubation    Date/Time: 5/24/2024 8:25 AM    Performed by: Yinka Velázquez CRNA  Authorized by: Chad Frazier MD    Intubation:     Induction:  Intravenous    Intubated:  Postinduction    Mask Ventilation:  Easy mask    Attempts:  1    Attempted By:  CRNA    Difficult Airway Encountered?: No      Complications:  None    Airway Device:  Supraglottic airway/LMA    Airway Device Size:  4.0    Placement Verified By:  Capnometry    Complicating Factors:  None    Findings Post-Intubation:  BS equal bilateral

## 2024-05-24 NOTE — OP NOTE
[facility]  Operative Note    Surgery Date: 5/24/2024      Surgeon(s) and Role:     * Mark Pemberton MD - Primary    Assistant: Jacob Renee    Pre-op Diagnosis:   Status post total right knee replacement [Z96.651] with stiffness    Post-op Diagnosis:  Post-Op Diagnosis Codes:     * Status post total right knee replacement [Z96.651]     Procedure:  Procedure(s) (LRB):  MANIPULATION, KNEE (Right)     Anesthesia:  LMA + adductor canal block     EBL:  0    Implants: * No implants in log *    Tourniquet time: 0 mins    Complication:   none    Procedure: The patient was taken to the operating room and placedsupine.  Anesthesia was administered and pre-operative antibiotics were given.  A timeout was performed.  Patient was positioned appropriately and prepped and draped in the usual sterile fashion.      Preop range of motion was 5° to 85°.  I was able to achieve additional extension with a gentle anterior directed force and then was able to achieve improved flexion with a posterior directed force achieve and 120° of flexion and near full extension.    Disposition:awakened from anesthesia, extubated and taken to the recovery room in a stable condition, having suffered no apparent untoward event.

## 2024-05-24 NOTE — ANESTHESIA POSTPROCEDURE EVALUATION
Anesthesia Post Evaluation    Patient: Angela Landaverde    Procedure(s) Performed: Procedure(s) (LRB):  MANIPULATION, KNEE (Right)    Final Anesthesia Type: general      Patient location during evaluation: PACU  Patient participation: Yes- Able to Participate  Level of consciousness: awake and sedated  Post-procedure vital signs: reviewed and stable  Pain management: adequate  Airway patency: patent    PONV status at discharge: No PONV  Anesthetic complications: no      Cardiovascular status: blood pressure returned to baseline  Respiratory status: unassisted  Hydration status: euvolemic  Follow-up not needed.              Vitals Value Taken Time   /63 05/24/24 1023   Temp 37.1 °C (98.7 °F) 05/24/24 0840   Pulse 46 05/24/24 1023   Resp 14 05/24/24 0940   SpO2 100 % 05/24/24 1005   Vitals shown include unfiled device data.      Event Time   Out of Recovery 09:35:00         Pain/Arnav Score: Pain Rating Prior to Med Admin: 5 (5/24/2024  9:15 AM)  Pain Rating Post Med Admin: 2 (5/24/2024  9:25 AM)  Arnav Score: 10 (5/24/2024  9:49 AM)  Modified Arnav Score: 20 (5/24/2024 10:24 AM)

## 2024-05-24 NOTE — OR NURSING
0840 REC'D TO PACU IN STABLE CONDITION. VSS. SATS 95% ON RA. DENIES PAIN AT THIS TIME. WILL CONT TO MONITOR.    0910 UPDATE ON PT STATUS GIVEN TO DAUGHTER VIA TEXT MESSAGE.    0940 TRANSFERRED TO IN STABLE CONDITION. REPORT GIVEN TO GENO EPSTEIN. /74 HR 54 SATS 100% ON 2L/NC. DAUGHTER AT BEDSIDE.

## 2024-05-24 NOTE — PLAN OF CARE
Problem: Physical Therapy  Goal: Physical Therapy Goal  Description: Pt will demo full knee ROM  Pt will be independent in home exercise program   Outcome: Progressing

## 2024-05-24 NOTE — TRANSFER OF CARE
"Anesthesia Transfer of Care Note    Patient: Angela Landaverde    Procedure(s) Performed: Procedure(s) (LRB):  MANIPULATION, KNEE (Right)    Patient location: PACU    Anesthesia Type: general and regional    Transport from OR: Transported from OR on 6-10 L/min O2 by face mask with adequate spontaneous ventilation    Post pain: adequate analgesia    Post assessment: no apparent anesthetic complications    Post vital signs: stable    Level of consciousness: awake and alert    Nausea/Vomiting: no nausea/vomiting    Complications: none    Transfer of care protocol was followed      Last vitals: Visit Vitals  /71 (BP Location: Right forearm, Patient Position: Lying)   Pulse (!) 51   Temp 37.1 °C (98.7 °F) (Oral)   Resp 10   Ht 5' 7" (1.702 m)   Wt 121.1 kg (267 lb)   SpO2 100%   Breastfeeding No   BMI 41.82 kg/m²     "

## 2024-05-29 ENCOUNTER — CLINICAL SUPPORT (OUTPATIENT)
Dept: REHABILITATION | Facility: HOSPITAL | Age: 64
End: 2024-05-29
Attending: NURSE PRACTITIONER
Payer: COMMERCIAL

## 2024-05-29 DIAGNOSIS — M25.661 DECREASED ROM OF RIGHT KNEE: ICD-10-CM

## 2024-05-29 DIAGNOSIS — M62.81 QUADRICEPS WEAKNESS: ICD-10-CM

## 2024-05-29 DIAGNOSIS — R26.9 GAIT DISTURBANCE: ICD-10-CM

## 2024-05-29 DIAGNOSIS — Z96.651 STATUS POST TOTAL RIGHT KNEE REPLACEMENT: Primary | ICD-10-CM

## 2024-05-29 PROCEDURE — 97110 THERAPEUTIC EXERCISES: CPT

## 2024-05-29 PROCEDURE — 97140 MANUAL THERAPY 1/> REGIONS: CPT

## 2024-05-29 NOTE — PLAN OF CARE
"[facility]   Physical Therapy Updated Plan of Care/Treatment Note     Name: Angela Landaverde  Clinic Number: [id number]    Therapy Diagnosis:        Encounter Diagnoses   Name Primary?    Status post total right knee replacement Yes    Decreased ROM of right knee      Quadriceps weakness      Gait disturbance        Physician: Ernestina Ramirez FNP    Visit Date: 5/29/2024    Physician Orders: PT Eval and Treat   Medical Diagnosis from Referral: see above  Evaluation Date: 4/4/2024  Authorization Period Expiration: 6/30/2024   Plan of Care Expiration: 7/26/2024     Date of Surgery: 3/11/2024  Visit # / Visits authorized: 15/17    FOTO: 2/ 3 (40/54)     Precautions: Standard    PTA Visit #: 0/5     Time In: 1:47 pm  Time Out: 2:40 pm  Total Billable Time:  53 billable minutes     Subjective     Pt reports:  knee did well over the weekend but has stiffened back up some  She was compliant with home exercise program.  Response to previous treatment: no complaints   Functional change: no change noted    Pain: 0/10  Location: right knee      Objective      Initial Evaluation 4/4/2024  Range of motion:  Motion Right Left    Knee extension   -26 degrees    -10 degrees    Knee flexion   87 degrees    90 degrees       Manual muscle test   Muscle Right  Left    Knee extension  MMT strength: 3+/5  MMT strength: 4+/5   Knee flexion  MMT strength: 3+/5  MMT strength: 4+/5     Reassessment on 5/29/2024:  Patient underwent LANCE of right knee on Fri 5/24/24. Preop range of motion was 10-85 degrees and postop she was "near full extension" to 120 degrees flexion.      ROM   -10 degrees extension and 90 degrees flexion on arrival; - 5 degrees extension after ideal stretch and 104 degrees flexion after manual stretching, heel slides w/ foot taps, and hamstring rolls     STRENGTH  Right knee extension 4-/5  Right knee flexion 4/5    Gait:  ambulated in today with straight cane and moderate antalgia on left - " "decreased knee extension during stance phase and decreased knee flexion during swing phase of gait    Treatment     Angela received the treatments listed below:      therapeutic exercises to develop strength, endurance, ROM, flexibility, posture, and core stabilization for 35 minutes including:  NuStep x 5 minutes for range of motion   Ideal stretch  10 x 10 second hold  Hamstring rolls for flexion/extension w/ 5sh x 4 min  Prone quad stretch 4 x 30sh  Heel slides w/ foot taps  Prone hangs x 8 min  Measure range of motion multiple times after stretches  Update home exercise program     (Not today)  Knee flexion and extension stretch at stairs    Seated leg curl 5 plates 3 x 10  Slant board x 2 minutes    manual therapy techniques: Joint mobilizations, Myofacial release, Soft tissue Mobilization, and Friction Massage were applied to the: right leg for 18 minutes, including:  Overpressure into extension in long sitting, supine, and prone; knee flexion stretching in supine (knee to chest)    Scar massage, patellar mobs, and MFR to HS in prone hang position.       neuromuscular re-education activities to improve: Balance, Coordination, Kinesthetic Sense, Proprioception, and Posture for 0 minutes. The following activities were included:  Quad set on slant board 10 x 10 second hold    Straight leg raise 2 x 10  Short arc quad 5sh x 30  Bilateral leg press 8 plates 3 x 15 w/ heel rock for terminal knee extension   Unilateral leg press 4 plates x 30 w/ heel rock for terminal knee extension       (Not Today)  Seated leg extension 1 plate x 30 up bilateral down unilateral   Prone quad sets x 20  Closed chain terminal knee extension 3 plates x 30    therapeutic activities to improve functional performance for 0 minutes, including:  -    (Not Today)  Step ups 6" forward x 30    gait training to improve functional mobility and safety for  0  billable minutes, including:  -    (Not Today)  In parallel bars with focus on heel strike " and toe off in forward and retro (10 minutes total)    supervised modalities after being cleared for contradictions: NMES Electrical Stimulation:  Angela received NMES Electrical Stimulation to elicit muscle contraction of the right quad. Pt received stimulation at a rate of 80 pps with symmetric current, ramp of 3 seconds with 10 second on time and 20 second off time. Patient tolerated treatment well without any adverse effects. 0 min... 8 min QS QS in propped extension with HP and 5# weight.      Patient Education and Home Exercises       Education provided:   - review of home exercise program and current Plan of Care/rationale of treatment.    Written Home Exercises Provided: Patient instructed to cont prior HEP. Exercises were reviewed and Angela was able to demonstrate them prior to the end of the session.  Angela demonstrated good understanding of the education provided. See EMR under Patient Instructions for exercises provided during therapy sessions    Assessment     At Evaluation:  Angela is a 63 y.o. female referred to outpatient Physical Therapy with a medical diagnosis of s/p right total knee replacement . Patient presents with typical postop symptoms of swelling, pain, decreased range of motion, quad weakness, and gait disturbance. She is currently off work. To return to work she will be required to do a lot of walking, lifting garbage, sweeping and mopping and carrying food trays or push food carts.     Current Assessment:  Angela arrived for first after manipulation on 5/24/2024. She still has a range of motion deficit but it is better than before. Upon initial evaluation she had 26-87 degrees of range of motion of right knee. On 5/24 before the manipulation she had 10-85 degrees of range of motion on the right knee. Today she ended with 5-104 degrees range of motion. She will need to continue physical therapy to maximize range of motion gains achieved by the manipulation and then begin to focus on  strengthening to improve patient's functional mobility and allow her to return to work at [employer].     Angela Is progressing towards her goals.   Pt prognosis is Good.     Pt will continue to benefit from skilled outpatient physical therapy to address the deficits listed in the problem list box on initial evaluation, provide pt/family education and to maximize pt's level of independence in the home and community environment.     Pt's spiritual, cultural and educational needs considered and pt agreeable to plan of care and goals.     Anticipated barriers to physical therapy: none    Goals:   SHORT TERM GOALS - 4 weeks  1.  Patient to be independent with home exercise program to facilitate carryover between therapy visits. MET  2.  Patient will have 2-120 degrees range of motion right knee for improved mobiilty. NOT MET - range of motion is 5-104 after stretching  3.  Patient will perform straight leg raise without quad lag increasing from resting for increased quad control. NOT MET  4.  Patient will increase manual muscle test of right lower extremity to 4+ to 5/5 for increased stability with gait and activiites of daily living. NOT MET/ONGOING  5.  Patient will be able to get in and out of the shower independently. NOT MET     LONG TERM GOALS - 8 weeks  1.  Patient will go up/down stairs reciprocal pattern without handrails and good eccentric control on right lower extremity. NOT MET  2.  Patient will ambulate independent without cane, without deviation and without pain. NOT MET  3.  Patient will be able to get in and out of the bathtub independently to be able to take a tub bath. NOT MET  4.  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  NOT MET/NOT RELEASED    Plan     Plan of care Certification: 5/29/2024 to 7/26/2024.     Outpatient Physical Therapy 2 times weekly for 8 weeks to include the following interventions: Electrical Stimulation NMES, Gait Training, Manual  Therapy, Moist Heat/ Ice, Neuromuscular Re-ed, Patient Education, Therapeutic Activities, Therapeutic Exercise, and Biofeedback and Vasopneumatic .     ANTONIO TRIANA, PT   05/29/2024

## 2024-05-31 ENCOUNTER — OFFICE VISIT (OUTPATIENT)
Dept: ORTHOPEDICS | Facility: CLINIC | Age: 64
End: 2024-05-31
Payer: COMMERCIAL

## 2024-05-31 ENCOUNTER — CLINICAL SUPPORT (OUTPATIENT)
Dept: REHABILITATION | Facility: HOSPITAL | Age: 64
End: 2024-05-31
Attending: NURSE PRACTITIONER
Payer: COMMERCIAL

## 2024-05-31 DIAGNOSIS — Z96.651 STATUS POST TOTAL RIGHT KNEE REPLACEMENT: Primary | ICD-10-CM

## 2024-05-31 DIAGNOSIS — R26.9 GAIT DISTURBANCE: ICD-10-CM

## 2024-05-31 DIAGNOSIS — M25.661 DECREASED ROM OF RIGHT KNEE: ICD-10-CM

## 2024-05-31 DIAGNOSIS — M62.81 QUADRICEPS WEAKNESS: ICD-10-CM

## 2024-05-31 PROCEDURE — 97110 THERAPEUTIC EXERCISES: CPT | Mod: CQ

## 2024-05-31 PROCEDURE — 99212 OFFICE O/P EST SF 10 MIN: CPT | Mod: PBBFAC | Performed by: NURSE PRACTITIONER

## 2024-05-31 PROCEDURE — 99024 POSTOP FOLLOW-UP VISIT: CPT | Mod: ,,, | Performed by: NURSE PRACTITIONER

## 2024-05-31 PROCEDURE — 97112 NEUROMUSCULAR REEDUCATION: CPT | Mod: CQ

## 2024-05-31 NOTE — PROGRESS NOTES
Post-Operative Total Knee        Manipulation, Knee - Right 5/24/2024       Pt is here today for First post-operative followup of her No surgery found.  she is doing well.  We have reviewed her findings and discussed plan of care and future treatment options, including the physical therapy plan.        Patient is 11 weeks postop right total knee arthroplasty in 1 week postop right knee manipulation, doing well.  She does have some swelling today.  Reports her motion has slightly improved.  She started physical therapy this week.  Physical Exam:     The affected knee was inspected today.   The wound is healing well with no signs of erythema or warmth.  There is no drainage.  No clinical signs or symptoms of infection are present.   ROM: 2-90  -Daniela's sign.  2+ pulses are present.         Assessment and Plan  There are no diagnoses linked to this encounter.          We will continue post-operative physical therapy until the patient feels that they are independent with a home rehab program.  Return to work status/ return to activities status was discussed today in detail. All questions were answered.    The use of stockings and DVT prophylaxis was discussed.  Will follow up in additional 5 weeks with radiographs at that time.     Continue physical therapy.  We did discuss working on exercises at home when she is not at physical therapy.  Continue to ice.  Work on knee motion.  We will allow her to return to work light duty.  Starting with 8 hour shift.  No squatting or heavy lifting.  We will likely increase her duty when she returns to clinic with Dr. Pemberton in 4-5 weeks.  There are no Patient Instructions on file for this visit.

## 2024-05-31 NOTE — PROGRESS NOTES
[facility]   Physical Therapy Treatment Note      Name: Angela Landaverde  Clinic Number: [id number]    Therapy Diagnosis:        Encounter Diagnoses   Name Primary?    Status post total right knee replacement Yes    Decreased ROM of right knee      Quadriceps weakness      Gait disturbance        Physician: Mark Pemberton MD    Visit Date: 5/31/2024    Physician Orders: PT Eval and Treat   Medical Diagnosis from Referral: see above  Evaluation Date: 4/4/2024  Authorization Period Expiration: 6/30/2024   Plan of Care Expiration: 7/26/2024.      Date of Surgery: 3/11/2024  Visit # / Visits authorized: 14/17    FOTO: 2/ 3 (40/54)     Precautions: Standard    PTA Visit #: 1/5     Time In: 10:27 am  Time Out: 11:12 am  Total Billable Time:  30 billable minutes     Subjective     Pt reports:  knee is stiff; saw MD this morning prior to tx   She was compliant with home exercise program.  Response to previous treatment: no complaints   Functional change: no change noted    Pain: 5/10  Location: right knee      Objective      Objective Measures updated at progress report unless specified.     5/31/2024  -16 resting extension, -13 active extension upon arrival ---> -8 active at end of session    Treatment     Angela received the treatments listed below:      therapeutic exercises to develop strength, endurance, ROM, flexibility, posture, and core stabilization for 15 minutes including:  NuStep x 6 minutes    Slant board x 2 minutes  HS Stretch 5x20 sec  Knee flexion and extension stretch at stairs   [place] stretch  10 x 10 second hold   MHP with Heel Prop x 5 minutes, 5#    manual therapy techniques: Joint mobilizations, Manual traction, Myofacial release, Manual Lymphatic Drainage, Soft tissue Mobilization, and Friction Massage were applied to the: right leg for 3 minutes, including:  Overpressure into extension in long sitting, scar massage    Scar massage, patellar mobs, and MFR to HS in prone hang  "position.       neuromuscular re-education activities to improve: Balance, Coordination, Kinesthetic Sense, Proprioception, and Posture for 12 minutes. The following activities were included:  Quad set on slant board 10 x 10 second hold    Straight leg raise 2 x 10  Short arc quad 5sh x 30 with belt  Prone TERMINAL KNEE EXTENSION 20x5 sec hold      (Not Today)  Seated leg extension 1 plate x 30 up bilateral down unilateral   Prone quad sets x 20  Closed chain terminal knee extension 3 plates x 30    therapeutic activities to improve functional performance for 0 minutes, including:  -    (Not Today)  Step ups 6" forward x 30    gait training to improve functional mobility and safety for  0  billable minutes, including:  -    (Not Today)  In parallel bars with focus on heel strike and toe off in forward and retro (10 minutes total)    supervised modalities after being cleared for contradictions: NMES Electrical Stimulation:  Angela received NMES Electrical Stimulation to elicit muscle contraction of the right quad. Pt received stimulation at a rate of 80 pps with symmetric current, ramp of 3 seconds with 10 second on time and 20 second off time. Patient tolerated treatment well without any adverse effects. 0 min... 8 min QS QS in propped extension with HP and 5# weight.      Patient Education and Home Exercises       Education provided:   - review of home exercise program and current Plan of Care/rationale of treatment.    Written Home Exercises Provided: Patient instructed to cont prior HEP. Exercises were reviewed and Angela was able to demonstrate them prior to the end of the session.  Angela demonstrated good understanding of the education provided. See EMR under Patient Instructions for exercises provided during therapy sessions    Assessment     At Evaluation:  Angela is a 63 y.o. female referred to outpatient Physical Therapy with a medical diagnosis of s/p right total knee replacement . Patient presents with typical " postop symptoms of swelling, pain, decreased range of motion, quad weakness, and gait disturbance. She is currently off work. To return to work she will be required to do a lot of walking, lifting garbage, sweeping and mopping and carrying food trays or push food carts.     Current Assessment:  Main focus was on getting knee into extension during session. Progressed strengthening exercises with emphasis on knee extension during session. RANGE OF MOTION progressed at end of session as noted above. Educated pt to keep up with her HEP diligently. Time billed reflects time spent one on one with pt.     Angela Is progressing towards her goals.   Pt prognosis is Good.     Pt will continue to benefit from skilled outpatient physical therapy to address the deficits listed in the problem list box on initial evaluation, provide pt/family education and to maximize pt's level of independence in the home and community environment.     Pt's spiritual, cultural and educational needs considered and pt agreeable to plan of care and goals.     Anticipated barriers to physical therapy: none    Goals:   SHORT TERM GOALS - 4 weeks  1.  Patient to be independent with home exercise program to facilitate carryover between therapy visits.  2.  Patient will have  degrees range of motion right knee for improved mobiilty.  3.  Patient will perform straight leg raise without quad lag increasing from resting for increased quad control.  4.  Patient will increase manual muscle test of right lower extremity to 4+ to 5/5 for increased stability with gait and activiites of daily living.  5.  Patient will be able to get in and out of the shower independently.     LONG TERM GOALS - 8 weeks  1.  Patient will go up/down stairs reciprocal pattern without handrails and good eccentric control on right lower extremity.  2.  Patient will ambulate independent without cane, without deviation and without pain.  3.  Patient will be able to get in and out of  the bathtub independently to be able to take a tub bath.  4.  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     Plan     Plan of care Certification: 5/29/2024 to 7/26/2024.   Outpatient Physical Therapy 2 times weekly for 8 weeks to include the following interventions: Electrical Stimulation NMES, Gait Training, Manual Therapy, Moist Heat/ Ice, Neuromuscular Re-ed, Patient Education, Therapeutic Activities, Therapeutic Exercise, and Biofeedback and Vasopneumatic .     Shant Blue, PTA   05/31/2024

## 2024-05-31 NOTE — LETTER
May 31, 2024      [facility]  [address] [place] [id number]  Phone: [phone]  Fax: [phone]       Patient: Angela Landaverde   YOB: 1960  Date of Visit: 05/31/2024    To Whom It May Concern:    Alejandro Landaverde  was at [facility] on 05/31/2024. The patient may return to work/school on 06/17/2024 with restrictions.   Please allow her to work 8 hours days, no heavy lifting, bending, stooping or squatting until after follow up appointment. If you have any questions or concerns, or if I can be of further assistance, please do not hesitate to contact me.    Sincerely,    MAKAYLA Crum

## 2024-06-03 ENCOUNTER — CLINICAL SUPPORT (OUTPATIENT)
Dept: REHABILITATION | Facility: HOSPITAL | Age: 64
End: 2024-06-03
Attending: NURSE PRACTITIONER
Payer: COMMERCIAL

## 2024-06-03 DIAGNOSIS — M25.661 DECREASED ROM OF RIGHT KNEE: ICD-10-CM

## 2024-06-03 DIAGNOSIS — Z96.651 STATUS POST TOTAL RIGHT KNEE REPLACEMENT: Primary | ICD-10-CM

## 2024-06-03 DIAGNOSIS — M62.81 QUADRICEPS WEAKNESS: ICD-10-CM

## 2024-06-03 DIAGNOSIS — R26.9 GAIT DISTURBANCE: ICD-10-CM

## 2024-06-03 PROCEDURE — 97110 THERAPEUTIC EXERCISES: CPT | Mod: CQ

## 2024-06-03 PROCEDURE — 97112 NEUROMUSCULAR REEDUCATION: CPT | Mod: CQ

## 2024-06-03 PROCEDURE — 97014 ELECTRIC STIMULATION THERAPY: CPT | Mod: CQ

## 2024-06-03 PROCEDURE — 97140 MANUAL THERAPY 1/> REGIONS: CPT | Mod: CQ

## 2024-06-03 NOTE — DISCHARGE SUMMARY
[facility]  Discharge Note  Short Stay    Procedure(s) (LRB):  MANIPULATION, KNEE (Right)      OUTCOME: Patient tolerated treatment/procedure well without complication and is now ready for discharge.    DISPOSITION: Home or Self Care    FINAL DIAGNOSIS:  <principal problem not specified> right knee arthrofibrosis    FOLLOWUP: In clinic    DISCHARGE INSTRUCTIONS:    Discharge Procedure Orders   Diet general     Remove dressing in 72 hours   Order Comments: Remove dressing in 3 days.  Place band-aids over portal sites.     Call MD for:  temperature >100.4     Call MD for:  persistent nausea and vomiting     Call MD for:  severe uncontrolled pain     Call MD for:  difficulty breathing, headache or visual disturbances     Call MD for:  redness, tenderness, or signs of infection (pain, swelling, redness, odor or green/yellow discharge around incision site)     Call MD for:  hives     Call MD for:  persistent dizziness or light-headedness     Call MD for:  extreme fatigue     Weight bearing restrictions (specify)   Order Comments: Please refer to op note for therapy plan         Clinical Reference Documents Added to Patient Instructions         Document    [facility] WORK EXCUSE - ACCOMPANY A PATIENT            TIME SPENT ON DISCHARGE: 9 minutes

## 2024-06-03 NOTE — PROGRESS NOTES
[facility]   Physical Therapy Treatment Note      Name: Angela Landaverde  Clinic Number: [id number]    Therapy Diagnosis:        Encounter Diagnoses   Name Primary?    Status post total right knee replacement Yes    Decreased ROM of right knee      Quadriceps weakness      Gait disturbance        Physician: Ernestina Ramirez FNP    Visit Date: 6/3/2024    Physician Orders: PT Eval and Treat   Medical Diagnosis from Referral: see above  Evaluation Date: 4/4/2024  Authorization Period Expiration: 7/30/2024   Plan of Care Expiration: 7/26/2024.      Date of Surgery: 3/11/2024  Visit # / Visits authorized: 17/27    FOTO: 2/ 3 (40/54)     Precautions: Standard    PTA Visit #: 2/5     Time In: 11:30 am  Time Out:  12:28 am  Total Billable Time:  58 billable minutes     Subjective     Pt reports:  knee is stiff; Reports stiffness every morning  She was compliant with home exercise program.  Response to previous treatment: no complaints   Functional change: no change noted    Pain: 3/10  Location: right knee      Objective      Objective Measures updated at progress report unless specified.     5/31/2024  AROM:        Extension: -12 o        Flexion: 98 o    -2 degree lag with SLR to -14 degrees   -8 degrees after ES and LLLD    Treatment     Angela received the treatments listed below:      therapeutic exercises to develop strength, endurance, ROM, flexibility, posture, and core stabilization for 15 minutes including:  NuStep x 6 minutes    Slant board x 2 minutes  HS Stretch 5x20 sec  Knee flexion and extension stretch at stairs   [place] stretch  10 x 10 second hold   MHP with Heel Prop x 5 minutes, 5#    (Not Today)  Seated leg curl 5 plates 3 x 10    manual therapy techniques: Joint mobilizations, Manual traction, Myofacial release, Manual Lymphatic Drainage, Soft tissue Mobilization, and Friction Massage were applied to the: right leg for 8 minutes, including:  Overpressure into extension in  "long sitting, scar massage  Scar massage, patellar mobs, and MFR to HS in prone hang position.       neuromuscular re-education activities to improve: Balance, Coordination, Kinesthetic Sense, Proprioception, and Posture for 19 minutes. The following activities were included:  Quad set on slant board 10 x 10 second hold    Straight leg raise 2 x 10  Short arc quad 5sh x 30 with belt  Prone TERMINAL KNEE EXTENSION 20x5 sec hold    (Not Today)  Bilateral leg press 8 plates 3 x 15 w/ heel rock for terminal knee extension   Unilateral leg press 4 plates x 30 w/ heel rock for terminal knee extension   Seated leg extension 1 plate x 30 up bilateral down unilateral   Prone quad sets x 20  Closed chain terminal knee extension 3 plates x 30    therapeutic activities to improve functional performance for 0 minutes, including:  -    (Not Today)  Step ups 6" forward x 30    gait training to improve functional mobility and safety for  0  billable minutes, including:  -    (Not Today)  In parallel bars with focus on heel strike and toe off in forward and retro (10 minutes total)    supervised modalities after being cleared for contradictions: NMES Electrical Stimulation:  Angela received NMES Electrical Stimulation to elicit muscle contraction of the right quad. Pt received stimulation at a rate of 80 pps with symmetric current, ramp of 3 seconds with 10 second on time and 20 second off time. Patient tolerated treatment well without any adverse effects. 16 min... 8 min QS QS in propped extension with HP and 5# weight... 4 min each of TKE and SLR      Patient Education and Home Exercises       Education provided:   - review of home exercise program and current Plan of Care/rationale of treatment.    Written Home Exercises Provided: Patient instructed to cont prior HEP. Exercises were reviewed and Angela was able to demonstrate them prior to the end of the session.  Angela demonstrated good understanding of the education provided. See " EMR under Patient Instructions for exercises provided during therapy sessions    Assessment     At Evaluation:  Angela is a 63 y.o. female referred to outpatient Physical Therapy with a medical diagnosis of s/p right total knee replacement . Patient presents with typical postop symptoms of swelling, pain, decreased range of motion, quad weakness, and gait disturbance. She is currently off work. To return to work she will be required to do a lot of walking, lifting garbage, sweeping and mopping and carrying food trays or push food carts.     Current Assessment:  Arrived reporting that she has been working on motion, but it is right back stiff every morning.  Main focus was on getting knee ROM during session. RANGE OF MOTION progressed at end of session as noted above. Educated pt to keep up with her HEP diligently. Time billed reflects time spent one on one with pt.     Angela Is progressing towards her goals.   Pt prognosis is Good.     Pt will continue to benefit from skilled outpatient physical therapy to address the deficits listed in the problem list box on initial evaluation, provide pt/family education and to maximize pt's level of independence in the home and community environment.     Pt's spiritual, cultural and educational needs considered and pt agreeable to plan of care and goals.     Anticipated barriers to physical therapy: none    Goals:   SHORT TERM GOALS - 4 weeks  1.  Patient to be independent with home exercise program to facilitate carryover between therapy visits.  2.  Patient will have  degrees range of motion right knee for improved mobiilty.  3.  Patient will perform straight leg raise without quad lag increasing from resting for increased quad control.  4.  Patient will increase manual muscle test of right lower extremity to 4+ to 5/5 for increased stability with gait and activiites of daily living.  5.  Patient will be able to get in and out of the shower independently.     LONG TERM  GOALS - 8 weeks  1.  Patient will go up/down stairs reciprocal pattern without handrails and good eccentric control on right lower extremity.  2.  Patient will ambulate independent without cane, without deviation and without pain.  3.  Patient will be able to get in and out of the bathtub independently to be able to take a tub bath.  4.  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     Plan     Plan of care Certification: 5/29/2024 to 7/26/2024.   Outpatient Physical Therapy 2 times weekly for 8 weeks to include the following interventions: Electrical Stimulation NMES, Gait Training, Manual Therapy, Moist Heat/ Ice, Neuromuscular Re-ed, Patient Education, Therapeutic Activities, Therapeutic Exercise, and Biofeedback and Vasopneumatic .     Jadiel Campbell, PTA   06/03/2024

## 2024-06-05 ENCOUNTER — CLINICAL SUPPORT (OUTPATIENT)
Dept: REHABILITATION | Facility: HOSPITAL | Age: 64
End: 2024-06-05
Payer: COMMERCIAL

## 2024-06-05 DIAGNOSIS — R26.9 GAIT DISTURBANCE: ICD-10-CM

## 2024-06-05 DIAGNOSIS — M62.81 QUADRICEPS WEAKNESS: ICD-10-CM

## 2024-06-05 DIAGNOSIS — Z96.651 STATUS POST TOTAL RIGHT KNEE REPLACEMENT: Primary | ICD-10-CM

## 2024-06-05 DIAGNOSIS — M25.661 DECREASED ROM OF RIGHT KNEE: ICD-10-CM

## 2024-06-05 PROCEDURE — 97112 NEUROMUSCULAR REEDUCATION: CPT

## 2024-06-05 PROCEDURE — 97140 MANUAL THERAPY 1/> REGIONS: CPT

## 2024-06-05 PROCEDURE — 97110 THERAPEUTIC EXERCISES: CPT

## 2024-06-05 PROCEDURE — 97014 ELECTRIC STIMULATION THERAPY: CPT

## 2024-06-05 NOTE — PROGRESS NOTES
[facility]   Physical Therapy Treatment Note      Name: Angela Landaverde  Clinic Number: [id number]    Therapy Diagnosis:        Encounter Diagnoses   Name Primary?    Status post total right knee replacement Yes    Decreased ROM of right knee      Quadriceps weakness      Gait disturbance        Physician: Ernestina Ramirez, MAKAYLA    Visit Date: 6/5/2024    Physician Orders: PT Eval and Treat   Medical Diagnosis from Referral: see above  Evaluation Date: 4/4/2024  Authorization Period Expiration: 7/30/2024   Plan of Care Expiration: 7/26/2024.      Date of Surgery: 3/11/2024  Visit # / Visits authorized: 18/27    FOTO: 2/ 3 (40/54)     Precautions: Standard    PTA Visit #: 0/5     Time In: 10:50 am  Time Out:  11:48 am  Total Billable Time:  51 billable minutes     Subjective     Pt reports:  not hurting bad this morning - took her Celebrex and pain pill before coming to therapy  She was compliant with home exercise program.  Response to previous treatment: no complaints   Functional change: no change noted    Pain: 1/10  Location: right knee      Objective      Objective Measures updated at progress report unless specified.     5/31/2024  AROM:        Extension: -10 o        Flexion: 98 o    no lag with SLR - stays at -10 degrees   -8 degrees after ES and LLLD    Treatment     Angela received the treatments listed below:      therapeutic exercises to develop strength, endurance, ROM, flexibility, posture, and core stabilization for 19 minutes including:  NuStep x 6 minutes    Slant board x 2 minutes  Seated leg curl 3 plates 3 x 10  HS Stretch 3x30 sec - Cybex  Ideal stretch  10 x 10 second hold     (Not Today)  Knee flexion and extension stretch at stairs   MHP with Heel Prop x 5 minutes, 5#      manual therapy techniques: Joint mobilizations, Manual traction, Myofacial release, Manual Lymphatic Drainage, Soft tissue Mobilization, and Friction Massage were applied to the: right leg for 12  "minutes, including:  Overpressure into extension in long sitting, scar massage, passive heel lifts    Scar massage, patellar mobs, and MFR to HS in prone hang position.       neuromuscular re-education activities to improve: Balance, Coordination, Kinesthetic Sense, Proprioception, and Posture for 15 minutes. The following activities were included:  Quad set 10sh x 5 min w/ NMES    Straight leg raise 10sh x 5 min w/ NMES and strap  Short arc quad 10sh x 5 min w/ NMES and strap      (Not Today)  Bilateral leg press 8 plates 3 x 15 w/ heel rock for terminal knee extension   Unilateral leg press 4 plates x 30 w/ heel rock for terminal knee extension   Seated leg extension 1 plate x 30 up bilateral down unilateral   Prone quad sets x 20  Closed chain terminal knee extension 3 plates x 30  Prone TERMINAL KNEE EXTENSION 20x5 sec hold    therapeutic activities to improve functional performance for 0 minutes, including:  -    (Not Today)  Step ups 6" forward x 30    gait training to improve functional mobility and safety for  0  billable minutes, including:  -    (Not Today)  In parallel bars with focus on heel strike and toe off in forward and retro (10 minutes total)    supervised modalities after being cleared for contradictions: NMES Electrical Stimulation:  Angela received NMES Electrical Stimulation to elicit muscle contraction of the right quad. Pt received stimulation at a rate of 80 pps with symmetric current, ramp of 3 seconds with 10 second on time and 20 second off time. Patient tolerated treatment well without any adverse effects. 15 min... 5 min each of QS, TKE and SLR      Patient Education and Home Exercises       Education provided:   - review of home exercise program and current Plan of Care/rationale of treatment.    Written Home Exercises Provided: Patient instructed to cont prior HEP. Exercises were reviewed and Angela was able to demonstrate them prior to the end of the session.  Angela demonstrated good " understanding of the education provided. See EMR under Patient Instructions for exercises provided during therapy sessions    Assessment     At Evaluation:  Angela is a 63 y.o. female referred to outpatient Physical Therapy with a medical diagnosis of s/p right total knee replacement . Patient presents with typical postop symptoms of swelling, pain, decreased range of motion, quad weakness, and gait disturbance. She is currently off work. To return to work she will be required to do a lot of walking, lifting garbage, sweeping and mopping and carrying food trays or push food carts.     Current Assessment:  Arrived reporting that she has been working on motion, but it is right back stiff every morning.  We are still focusing more on range of motion at this time. Spent a lot of time on improving extension range of motion. Did utilize electrical stimulation to work on quad control too. She did start with -10 degrees extension today compared to -12 last visit. She started with the same flexion range of motion as she ended with last visit. Flexion was not measured today at treatment end. Educated pt to keep up with her HEP diligently. Time billed reflects time spent one on one with pt.     Angela Is progressing towards her goals.   Pt prognosis is Good.     Pt will continue to benefit from skilled outpatient physical therapy to address the deficits listed in the problem list box on initial evaluation, provide pt/family education and to maximize pt's level of independence in the home and community environment.     Pt's spiritual, cultural and educational needs considered and pt agreeable to plan of care and goals.     Anticipated barriers to physical therapy: none    Goals:   SHORT TERM GOALS - 4 weeks  1.  Patient to be independent with home exercise program to facilitate carryover between therapy visits.  2.  Patient will have  degrees range of motion right knee for improved mobiilty.  3.  Patient will perform straight  leg raise without quad lag increasing from resting for increased quad control.  4.  Patient will increase manual muscle test of right lower extremity to 4+ to 5/5 for increased stability with gait and activiites of daily living.  5.  Patient will be able to get in and out of the shower independently.     LONG TERM GOALS - 8 weeks  1.  Patient will go up/down stairs reciprocal pattern without handrails and good eccentric control on right lower extremity.  2.  Patient will ambulate independent without cane, without deviation and without pain.  3.  Patient will be able to get in and out of the bathtub independently to be able to take a tub bath.  4.  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     Plan     Plan of care Certification: 5/29/2024 to 7/26/2024.   Outpatient Physical Therapy 2 times weekly for 8 weeks to include the following interventions: Electrical Stimulation NMES, Gait Training, Manual Therapy, Moist Heat/ Ice, Neuromuscular Re-ed, Patient Education, Therapeutic Activities, Therapeutic Exercise, and Biofeedback and Vasopneumatic .     ANTONIO TRIANA, PT   06/05/2024

## 2024-06-11 ENCOUNTER — CLINICAL SUPPORT (OUTPATIENT)
Dept: REHABILITATION | Facility: HOSPITAL | Age: 64
End: 2024-06-11
Attending: NURSE PRACTITIONER
Payer: COMMERCIAL

## 2024-06-11 DIAGNOSIS — M62.81 QUADRICEPS WEAKNESS: ICD-10-CM

## 2024-06-11 DIAGNOSIS — Z96.651 STATUS POST TOTAL RIGHT KNEE REPLACEMENT: Primary | ICD-10-CM

## 2024-06-11 DIAGNOSIS — M25.661 DECREASED ROM OF RIGHT KNEE: ICD-10-CM

## 2024-06-11 DIAGNOSIS — R26.9 GAIT DISTURBANCE: ICD-10-CM

## 2024-06-11 PROCEDURE — 97110 THERAPEUTIC EXERCISES: CPT | Mod: CQ

## 2024-06-11 PROCEDURE — 97014 ELECTRIC STIMULATION THERAPY: CPT | Mod: CQ

## 2024-06-11 PROCEDURE — 97112 NEUROMUSCULAR REEDUCATION: CPT | Mod: CQ

## 2024-06-11 NOTE — PROGRESS NOTES
[facility]   Physical Therapy Treatment Note      Name: Angela Landaverde  Clinic Number: [id number]    Therapy Diagnosis:        Encounter Diagnoses   Name Primary?    Status post total right knee replacement Yes    Decreased ROM of right knee      Quadriceps weakness      Gait disturbance        Physician: Ernestina Ramirez FNP    Visit Date: 6/11/2024    Physician Orders: PT Eval and Treat   Medical Diagnosis from Referral: see above  Evaluation Date: 4/4/2024  Authorization Period Expiration: 7/30/2024   Plan of Care Expiration: 7/26/2024.      Date of Surgery: 3/11/2024  Visit # / Visits authorized: 19/27    FOTO: 2/ 3 (40/54)     Precautions: Standard    PTA Visit #: 1/5     Time In: 2:34 pm  Time Out:  3:30 pm  Total Billable Time:  30 billable minutes     Subjective     Pt reports:  her knee is feeling okay today   She was compliant with home exercise program.  Response to previous treatment: no complaints   Functional change: no change noted    Pain: 1/10  Location: right knee      Objective      Objective Measures updated at progress report unless specified.     6/11/2024  Resting -12 at start --> -9 after MHP/stim    Treatment     Angela received the treatments listed below:      therapeutic exercises to develop strength, endurance, ROM, flexibility, posture, and core stabilization for 8 minutes including:  NuStep x 6 minutes    Slant board x 2 minutes  Seated leg curl 4 plates 3 x 10 with 5 sec pull into ext  HS Stretch 3x30 sec - Cybex  Ideal stretch  10 x 10 second hold     MHP with Heel Prop x 5 minutes, 5#      manual therapy techniques: Joint mobilizations, Manual traction, Myofacial release, Manual Lymphatic Drainage, Soft tissue Mobilization, and Friction Massage were applied to the: right leg for 0 minutes, including:  Overpressure into extension in long sitting, scar massage, passive heel lifts    Scar massage, patellar mobs, and MFR to HS in prone hang position.    "    neuromuscular re-education activities to improve: Balance, Coordination, Kinesthetic Sense, Proprioception, and Posture for 18 minutes. The following activities were included:  Quad set 10sh x 5 min w/ NMES    Straight leg raise 10sh x 5 min w/ NMES and strap  Short arc quad 10sh x 5 min w/ NMES and strap  Bilateral leg press 8 plates 3 x 15 w/ heel rock for terminal knee extension   Unilateral leg press 4 plates x 30 w/ heel rock for terminal knee extension       therapeutic activities to improve functional performance for 0 minutes, including:  -    (Not Today)  Step ups 6" forward x 30    gait training to improve functional mobility and safety for  0  billable minutes, including:  -    (Not Today)  In parallel bars with focus on heel strike and toe off in forward and retro (10 minutes total)    supervised modalities after being cleared for contradictions: NMES Electrical Stimulation:  Angela received NMES Electrical Stimulation to elicit muscle contraction of the right quad. Pt received stimulation at a rate of 80 pps with symmetric current, ramp of 3 seconds with 10 second on time and 20 second off time. Patient tolerated treatment well without any adverse effects. 15 min... 5 min each of QS, TKE and SLR      Patient Education and Home Exercises       Education provided:   - review of home exercise program and current Plan of Care/rationale of treatment.    Written Home Exercises Provided: Patient instructed to cont prior HEP. Exercises were reviewed and Angela was able to demonstrate them prior to the end of the session.  Angela demonstrated good understanding of the education provided. See EMR under Patient Instructions for exercises provided during therapy sessions    Assessment     At Evaluation:  Angela is a 63 y.o. female referred to outpatient Physical Therapy with a medical diagnosis of s/p right total knee replacement . Patient presents with typical postop symptoms of swelling, pain, decreased range of " motion, quad weakness, and gait disturbance. She is currently off work. To return to work she will be required to do a lot of walking, lifting garbage, sweeping and mopping and carrying food trays or push food carts.     Current Assessment:  Still very stiff into knee extension and RANGE OF MOTION progressed by end of session as noted above. [place] E-stim performed with parameters noted above. Improved quad contraction after this. Progressed CKC strengthening with no increased pain noted. Educated pt to be diligent with her HEP. Time billed reflects time spent one on one with pt.    Angela Is progressing towards her goals.   Pt prognosis is Good.     Pt will continue to benefit from skilled outpatient physical therapy to address the deficits listed in the problem list box on initial evaluation, provide pt/family education and to maximize pt's level of independence in the home and community environment.     Pt's spiritual, cultural and educational needs considered and pt agreeable to plan of care and goals.     Anticipated barriers to physical therapy: none    Goals:   SHORT TERM GOALS - 4 weeks  1.  Patient to be independent with home exercise program to facilitate carryover between therapy visits.  2.  Patient will have  degrees range of motion right knee for improved mobiilty.  3.  Patient will perform straight leg raise without quad lag increasing from resting for increased quad control.  4.  Patient will increase manual muscle test of right lower extremity to 4+ to 5/5 for increased stability with gait and activiites of daily living.  5.  Patient will be able to get in and out of the shower independently.     LONG TERM GOALS - 8 weeks  1.  Patient will go up/down stairs reciprocal pattern without handrails and good eccentric control on right lower extremity.  2.  Patient will ambulate independent without cane, without deviation and without pain.  3.  Patient will be able to get in and out of the bathtub  independently to be able to take a tub bath.  4.  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     Plan     Plan of care Certification: 5/29/2024 to 7/26/2024.   Outpatient Physical Therapy 2 times weekly for 8 weeks to include the following interventions: Electrical Stimulation NMES, Gait Training, Manual Therapy, Moist Heat/ Ice, Neuromuscular Re-ed, Patient Education, Therapeutic Activities, Therapeutic Exercise, and Biofeedback and Vasopneumatic .     Shant Blue, PTA   06/11/2024

## 2024-06-12 ENCOUNTER — TELEPHONE (OUTPATIENT)
Dept: ORTHOPEDICS | Facility: CLINIC | Age: 64
End: 2024-06-12
Payer: COMMERCIAL

## 2024-06-12 NOTE — TELEPHONE ENCOUNTER
----- Message from Smiley Vallecillo sent at 6/12/2024  2:29 PM CDT -----  Who Called: Angela Landaverde    Caller is requesting assistance/information from provider's office.    Symptoms (please be specific): Mery,patient need to discuss with you about her return to work slip     Preferred Method of Contact: Phone Call  Patient's Preferred Phone Number on File: [phone]   Best Call Back Number, if different:  Additional Information:

## 2024-06-14 ENCOUNTER — CLINICAL SUPPORT (OUTPATIENT)
Dept: REHABILITATION | Facility: HOSPITAL | Age: 64
End: 2024-06-14
Payer: COMMERCIAL

## 2024-06-14 DIAGNOSIS — M62.81 QUADRICEPS WEAKNESS: ICD-10-CM

## 2024-06-14 DIAGNOSIS — M25.661 DECREASED ROM OF RIGHT KNEE: ICD-10-CM

## 2024-06-14 DIAGNOSIS — Z96.651 STATUS POST TOTAL RIGHT KNEE REPLACEMENT: Primary | ICD-10-CM

## 2024-06-14 DIAGNOSIS — R26.9 GAIT DISTURBANCE: ICD-10-CM

## 2024-06-14 PROCEDURE — 97140 MANUAL THERAPY 1/> REGIONS: CPT | Mod: CQ

## 2024-06-14 PROCEDURE — 97112 NEUROMUSCULAR REEDUCATION: CPT | Mod: CQ

## 2024-06-14 PROCEDURE — 97014 ELECTRIC STIMULATION THERAPY: CPT | Mod: CQ

## 2024-06-14 NOTE — PROGRESS NOTES
[facility]   Physical Therapy Treatment Note      Name: Angela Landaverde  Clinic Number: [id number]    Therapy Diagnosis:        Encounter Diagnoses   Name Primary?    Status post total right knee replacement Yes    Decreased ROM of right knee      Quadriceps weakness      Gait disturbance        Physician: Ernestina Ramirez, MAKAYLA    Visit Date: 6/14/2024    Physician Orders: PT Eval and Treat   Medical Diagnosis from Referral: see above  Evaluation Date: 4/4/2024  Authorization Period Expiration: 7/30/2024   Plan of Care Expiration: 7/26/2024.      Date of Surgery: 3/11/2024  Visit # / Visits authorized:20/27    FOTO: 2/ 3 (40/54)     Precautions: Standard    PTA Visit #: 2/5     Time In: 10:15 am  Time Out:  11:19 am  Total Billable Time:  41 billable minutes (23 min non billed)    Subjective     Pt reports:  her knee is feeling okay today... reports some anterior shin pain  She was compliant with home exercise program.  Response to previous treatment: no complaints   Functional change: no change noted    Pain: 2/10  Location: right knee      Objective      Objective Measures updated at progress report unless specified.     6/14/2024  Resting -12 at start --> -8 after MHP/stim   No lag with SLR   98 degrees flexion    Treatment     Angela received the treatments listed below:      therapeutic exercises to develop strength, endurance, ROM, flexibility, posture, and core stabilization for 0 minutes including:  NuStep x 6 minutes    Slant board x 2 minutes  Seated leg curl 4 plates 3 x 10 with 5 sec pull into ext  HS Stretch 3x30 sec - Cybex  Ideal stretch  10 x 10 second hold   Flexion rolls--3 x 10  MHP with Heel Prop x 10 minutes, 9# with ES      manual therapy techniques: Joint mobilizations, Manual traction, Myofacial release, Manual Lymphatic Drainage, Soft tissue Mobilization, and Friction Massage were applied to the: right leg for 8 minutes, including:  Overpressure into extension in  "long sitting, scar massage, passive heel lifts  Scar massage, patellar mobs, and MFR to HS in prone hang position.       neuromuscular re-education activities to improve: Balance, Coordination, Kinesthetic Sense, Proprioception, and Posture for 18 minutes. The following activities were included:  Quad set 10sh x 5 min w/ NMES    Straight leg raise 10sh x 5 min w/ NMES and strap  Short arc quad 10sh x 5 min w/ NMES and strap  Bilateral leg press 8 plates 3 x 15 w/ heel rock for terminal knee extension   Unilateral leg press 4 plates x 30 w/ heel rock for terminal knee extension       therapeutic activities to improve functional performance for 0 minutes, including:  -    (Not Today)  Step ups 6" forward x 30    gait training to improve functional mobility and safety for  0  billable minutes, including:  -    (Not Today)  In parallel bars with focus on heel strike and toe off in forward and retro (10 minutes total)    supervised modalities after being cleared for contradictions: NMES Electrical Stimulation:  Angela received NMES Electrical Stimulation to elicit muscle contraction of the right quad. Pt received stimulation at a rate of 80 pps with symmetric current, ramp of 3 seconds with 10 second on time and 20 second off time. Patient tolerated treatment well without any adverse effects. 15 min... 5 min each of QS, TKE and SLR      Patient Education and Home Exercises       Education provided:   - review of home exercise program and current Plan of Care/rationale of treatment.    Written Home Exercises Provided: Patient instructed to cont prior HEP. Exercises were reviewed and Angela was able to demonstrate them prior to the end of the session.  Angela demonstrated good understanding of the education provided. See EMR under Patient Instructions for exercises provided during therapy sessions    Assessment     At Evaluation:  Angela is a 63 y.o. female referred to outpatient Physical Therapy with a medical diagnosis of s/p " right total knee replacement . Patient presents with typical postop symptoms of swelling, pain, decreased range of motion, quad weakness, and gait disturbance. She is currently off work. To return to work she will be required to do a lot of walking, lifting garbage, sweeping and mopping and carrying food trays or push food carts.     Current Assessment:  Still very stiff into knee extension and RANGE OF MOTION.  No carry over with extension ROM between visits.  Discussed in detail frequency and wats to increase ROM @ home.  [place] E-stim performed with parameters noted above. Improved quad contraction after this. Progressed CKC strengthening with no increased pain noted. Educated pt to be diligent with her HEP. Time billed reflects time spent one on one with pt.    Angela Is progressing towards her goals.   Pt prognosis is Good.     Pt will continue to benefit from skilled outpatient physical therapy to address the deficits listed in the problem list box on initial evaluation, provide pt/family education and to maximize pt's level of independence in the home and community environment.     Pt's spiritual, cultural and educational needs considered and pt agreeable to plan of care and goals.     Anticipated barriers to physical therapy: none    Goals:   SHORT TERM GOALS - 4 weeks  1.  Patient to be independent with home exercise program to facilitate carryover between therapy visits.  2.  Patient will have  degrees range of motion right knee for improved mobiilty.  3.  Patient will perform straight leg raise without quad lag increasing from resting for increased quad control.  4.  Patient will increase manual muscle test of right lower extremity to 4+ to 5/5 for increased stability with gait and activiites of daily living.  5.  Patient will be able to get in and out of the shower independently.     LONG TERM GOALS - 8 weeks  1.  Patient will go up/down stairs reciprocal pattern without handrails and good eccentric  control on right lower extremity.  2.  Patient will ambulate independent without cane, without deviation and without pain.  3.  Patient will be able to get in and out of the bathtub independently to be able to take a tub bath.  4.  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     Plan     Plan of care Certification: 5/29/2024 to 7/26/2024.   Outpatient Physical Therapy 2 times weekly for 8 weeks to include the following interventions: Electrical Stimulation NMES, Gait Training, Manual Therapy, Moist Heat/ Ice, Neuromuscular Re-ed, Patient Education, Therapeutic Activities, Therapeutic Exercise, and Biofeedback and Vasopneumatic .     Jadiel Campbell, PTA   06/14/2024

## 2024-06-18 ENCOUNTER — CLINICAL SUPPORT (OUTPATIENT)
Dept: REHABILITATION | Facility: HOSPITAL | Age: 64
End: 2024-06-18
Attending: NURSE PRACTITIONER
Payer: COMMERCIAL

## 2024-06-18 DIAGNOSIS — R26.9 GAIT DISTURBANCE: ICD-10-CM

## 2024-06-18 DIAGNOSIS — M25.661 DECREASED ROM OF RIGHT KNEE: ICD-10-CM

## 2024-06-18 DIAGNOSIS — M62.81 QUADRICEPS WEAKNESS: ICD-10-CM

## 2024-06-18 DIAGNOSIS — Z96.651 STATUS POST TOTAL RIGHT KNEE REPLACEMENT: Primary | ICD-10-CM

## 2024-06-18 PROCEDURE — 97112 NEUROMUSCULAR REEDUCATION: CPT | Mod: CQ

## 2024-06-18 PROCEDURE — 97014 ELECTRIC STIMULATION THERAPY: CPT | Mod: CQ

## 2024-06-18 PROCEDURE — 97140 MANUAL THERAPY 1/> REGIONS: CPT | Mod: CQ

## 2024-06-18 NOTE — PROGRESS NOTES
[facility]   Physical Therapy Treatment Note      Name: Angela Landaverde  Clinic Number: [id number]    Therapy Diagnosis:        Encounter Diagnoses   Name Primary?    Status post total right knee replacement Yes    Decreased ROM of right knee      Quadriceps weakness      Gait disturbance        Physician: Ernestina Ramirez FNP    Visit Date: 6/18/2024    Physician Orders: PT Eval and Treat   Medical Diagnosis from Referral: see above  Evaluation Date: 4/4/2024  Authorization Period Expiration: 7/30/2024   Plan of Care Expiration: 7/26/2024.      Date of Surgery: 3/11/2024  Visit # / Visits authorized:21/27    FOTO: 2/ 3 (40/54)     Precautions: Standard    PTA Visit #: 3/5     Time In: 2:37 pm  Time Out:  3:38 pm  Total Billable Time:  23 billable minutes    Subjective     Pt reports:  she has had some fluid build up on her shin.  She was compliant with home exercise program.  Response to previous treatment: no complaints   Functional change: no change noted    Pain: 2/10  Location: right knee      Objective      Objective Measures updated at progress report unless specified.     6/14/2024  Resting -7 at start    No lag with SLR   96 degrees flexion    Treatment     Angela received the treatments listed below:      therapeutic exercises to develop strength, endurance, ROM, flexibility, posture, and core stabilization for 0 minutes including:  NuStep x 6 minutes    Slant board x 2 minutes  Seated leg curl 4 plates 3 x 10 with 5 sec pull into ext  HS Stretch 3x30 sec - Cybex  Ideal stretch  10 x 10 second hold   Flexion rolls--3 x 10  MHP with Heel Prop x 10 minutes, 9# with ES (not performed today)       manual therapy techniques: Joint mobilizations, Manual traction, Myofacial release, Manual Lymphatic Drainage, Soft tissue Mobilization, and Friction Massage were applied to the: right leg for 8 minutes, including:  Overpressure into extension in long sitting, scar massage, passive heel  "lifts  Scar massage, patellar mobs, and MFR to HS in prone hang position.       neuromuscular re-education activities to improve: Balance, Coordination, Kinesthetic Sense, Proprioception, and Posture for 15 minutes. The following activities were included:  Quad set 10sh x 5 min w/ NMES    Straight leg raise 10sh x 5 min w/ NMES and strap  Short arc quad 10sh x 5 min w/ NMES and strap  Bilateral leg press 8 plates 3 x 10 w/ heel rock for terminal knee extension   Unilateral leg press 4 plates x 45 w/ heel rock for terminal knee extension       therapeutic activities to improve functional performance for 0 minutes, including:  -    (Not Today)  Step ups 6" forward x 30    gait training to improve functional mobility and safety for  0  billable minutes, including:  -    (Not Today)  In parallel bars with focus on heel strike and toe off in forward and retro (10 minutes total)    supervised modalities after being cleared for contradictions: NMES Electrical Stimulation:  Angela received NMES Electrical Stimulation to elicit muscle contraction of the right quad. Pt received stimulation at a rate of 80 pps with symmetric current, ramp of 3 seconds with 10 second on time and 20 second off time. Patient tolerated treatment well without any adverse effects. 15 min... 5 min each of QS, TKE and SLR      Patient Education and Home Exercises       Education provided:   - review of home exercise program and current Plan of Care/rationale of treatment.    Written Home Exercises Provided: Patient instructed to cont prior HEP. Exercises were reviewed and Angela was able to demonstrate them prior to the end of the session.  Angela demonstrated good understanding of the education provided. See EMR under Patient Instructions for exercises provided during therapy sessions    Assessment     At Evaluation:  Angela is a 63 y.o. female referred to outpatient Physical Therapy with a medical diagnosis of s/p right total knee replacement . Patient " presents with typical postop symptoms of swelling, pain, decreased range of motion, quad weakness, and gait disturbance. She is currently off work. To return to work she will be required to do a lot of walking, lifting garbage, sweeping and mopping and carrying food trays or push food carts.     Current Assessment:  Angela remains tight in extension but she did have more on arrival today compared to last visit. She was able to get a good volitional quad set after neuromuscular electrical stimulation. She received myofascial release in prone to decrease spasms in hamstrings. She still has difficulty steph quads without cocontracting.  Educated pt to be diligent with her HEP. Time billed reflects time spent one on one with pt.    Angela Is progressing towards her goals.   Pt prognosis is Good.     Pt will continue to benefit from skilled outpatient physical therapy to address the deficits listed in the problem list box on initial evaluation, provide pt/family education and to maximize pt's level of independence in the home and community environment.     Pt's spiritual, cultural and educational needs considered and pt agreeable to plan of care and goals.     Anticipated barriers to physical therapy: none    Goals:   SHORT TERM GOALS - 4 weeks  1.  Patient to be independent with home exercise program to facilitate carryover between therapy visits.  2.  Patient will have  degrees range of motion right knee for improved mobiilty.  3.  Patient will perform straight leg raise without quad lag increasing from resting for increased quad control.  4.  Patient will increase manual muscle test of right lower extremity to 4+ to 5/5 for increased stability with gait and activiites of daily living.  5.  Patient will be able to get in and out of the shower independently.     LONG TERM GOALS - 8 weeks  1.  Patient will go up/down stairs reciprocal pattern without handrails and good eccentric control on right lower  extremity.  2.  Patient will ambulate independent without cane, without deviation and without pain.  3.  Patient will be able to get in and out of the bathtub independently to be able to take a tub bath.  4.  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     Plan     Plan of care Certification: 5/29/2024 to 7/26/2024.   Outpatient Physical Therapy 2 times weekly for 8 weeks to include the following interventions: Electrical Stimulation NMES, Gait Training, Manual Therapy, Moist Heat/ Ice, Neuromuscular Re-ed, Patient Education, Therapeutic Activities, Therapeutic Exercise, and Biofeedback and Vasopneumatic .     Norma San, PTA   06/18/2024

## 2024-06-20 ENCOUNTER — CLINICAL SUPPORT (OUTPATIENT)
Dept: REHABILITATION | Facility: HOSPITAL | Age: 64
End: 2024-06-20
Payer: COMMERCIAL

## 2024-06-20 DIAGNOSIS — Z96.651 STATUS POST TOTAL RIGHT KNEE REPLACEMENT: Primary | ICD-10-CM

## 2024-06-20 DIAGNOSIS — M62.81 QUADRICEPS WEAKNESS: ICD-10-CM

## 2024-06-20 DIAGNOSIS — M25.661 DECREASED ROM OF RIGHT KNEE: ICD-10-CM

## 2024-06-20 DIAGNOSIS — R26.9 GAIT DISTURBANCE: ICD-10-CM

## 2024-06-20 PROCEDURE — 97112 NEUROMUSCULAR REEDUCATION: CPT | Mod: CQ

## 2024-06-20 PROCEDURE — 97014 ELECTRIC STIMULATION THERAPY: CPT | Mod: CQ

## 2024-06-20 NOTE — PROGRESS NOTES
[facility]   Physical Therapy Treatment Note      Name: Angela Landaverde  Clinic Number: [id number]    Therapy Diagnosis:        Encounter Diagnoses   Name Primary?    Status post total right knee replacement Yes    Decreased ROM of right knee      Quadriceps weakness      Gait disturbance        Physician: Ernestina Ramirez FNP    Visit Date: 6/20/2024    Physician Orders: PT Eval and Treat   Medical Diagnosis from Referral: see above  Evaluation Date: 4/4/2024  Authorization Period Expiration: 7/30/2024   Plan of Care Expiration: 7/26/2024.      Date of Surgery: 3/11/2024  Visit # / Visits authorized:22/27    FOTO: 2/ 3 (40/54)     Precautions: Standard    PTA Visit #: 4/5     Time In: 11:00 am  Time Out:  11:38 pm  Total Billable Time:  23 billable minutes    Subjective     Pt reports:  the fluid is going down in her shin. She felt fine after last visit, no soreness.  She was compliant with home exercise program.  Response to previous treatment: no complaints   Functional change: no change noted    Pain: 2/10  Location: right knee      Objective      Objective Measures updated at progress report unless specified.     6/14/2024  Resting -9 at start    No lag with SLR   96 degrees flexion    Treatment     Angela received the treatments listed below:      therapeutic exercises to develop strength, endurance, ROM, flexibility, posture, and core stabilization for 0 minutes including:  NuStep x 6 minutes    Slant board x 2 minutes  Seated leg curl 4 plates 3 x 10 with 5 sec pull into ext  HS Stretch 3x30 sec - Cybex  Ideal stretch 10 x 10 second hold     Flexion rolls  MHP with Heel Prop x 10 minutes, 9# with ES (not performed today)       manual therapy techniques: Joint mobilizations, Manual traction, Myofacial release, Manual Lymphatic Drainage, Soft tissue Mobilization, and Friction Massage were applied to the: right leg for 0 minutes, including:  Overpressure into extension in long  "sitting, scar massage, passive heel lifts  Scar massage, patellar mobs, and MFR to HS in prone hang position.       neuromuscular re-education activities to improve: Balance, Coordination, Kinesthetic Sense, Proprioception, and Posture for 25 minutes. The following activities were included:  Quad set 10sh x 5 min w/ NMES    Straight leg raise 10sh x 5 min w/ NMES and strap  Short arc quad 10sh x 5 min w/ NMES and strap  Bilateral leg press 8 plates 3 x 10 w/ heel rock for terminal knee extension   Unilateral leg press 4 plates x 45 w/ heel rock for terminal knee extension       therapeutic activities to improve functional performance for 0 minutes, including:  -    (Not Today)  Step ups 6" forward x 30    gait training to improve functional mobility and safety for  0  billable minutes, including:  -    (Not Today)  In parallel bars with focus on heel strike and toe off in forward and retro (10 minutes total)    supervised modalities after being cleared for contradictions: NMES Electrical Stimulation:  Angela received NMES Electrical Stimulation to elicit muscle contraction of the right quad. Pt received stimulation at a rate of 80 pps with symmetric current, ramp of 3 seconds with 10 second on time and 20 second off time. Patient tolerated treatment well without any adverse effects. 15 min... 5 min each of QS, TKE and SLR      Patient Education and Home Exercises       Education provided:   - review of home exercise program and current Plan of Care/rationale of treatment.    Written Home Exercises Provided: Patient instructed to cont prior HEP. Exercises were reviewed and Angela was able to demonstrate them prior to the end of the session.  Angela demonstrated good understanding of the education provided. See EMR under Patient Instructions for exercises provided during therapy sessions    Assessment     At Evaluation:  Angela is a 63 y.o. female referred to outpatient Physical Therapy with a medical diagnosis of s/p right " total knee replacement . Patient presents with typical postop symptoms of swelling, pain, decreased range of motion, quad weakness, and gait disturbance. She is currently off work. To return to work she will be required to do a lot of walking, lifting garbage, sweeping and mopping and carrying food trays or push food carts.     Current Assessment:  Angela was not sore after last treatment. She continues to have trouble reaching full extension without steph hamstrings to resist last 10 degrees. She arrived with -9 degrees extension and ended with -6 degrees.  She still has difficulty steph quads without cocontracting.  Educated pt to be diligent with her HEP. Time billed reflects time spent one on one with pt.    Angela Is progressing towards her goals.   Pt prognosis is Good.     Pt will continue to benefit from skilled outpatient physical therapy to address the deficits listed in the problem list box on initial evaluation, provide pt/family education and to maximize pt's level of independence in the home and community environment.     Pt's spiritual, cultural and educational needs considered and pt agreeable to plan of care and goals.     Anticipated barriers to physical therapy: none    Goals:   SHORT TERM GOALS - 4 weeks  1.  Patient to be independent with home exercise program to facilitate carryover between therapy visits.  2.  Patient will have  degrees range of motion right knee for improved mobiilty.  3.  Patient will perform straight leg raise without quad lag increasing from resting for increased quad control.  4.  Patient will increase manual muscle test of right lower extremity to 4+ to 5/5 for increased stability with gait and activiites of daily living.  5.  Patient will be able to get in and out of the shower independently.     LONG TERM GOALS - 8 weeks  1.  Patient will go up/down stairs reciprocal pattern without handrails and good eccentric control on right lower extremity.  2.   Patient will ambulate independent without cane, without deviation and without pain.  3.  Patient will be able to get in and out of the bathtub independently to be able to take a tub bath.  4.  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     Plan     Plan of care Certification: 5/29/2024 to 7/26/2024.   Outpatient Physical Therapy 2 times weekly for 8 weeks to include the following interventions: Electrical Stimulation NMES, Gait Training, Manual Therapy, Moist Heat/ Ice, Neuromuscular Re-ed, Patient Education, Therapeutic Activities, Therapeutic Exercise, and Biofeedback and Vasopneumatic .     Norma San, PTA   06/20/2024

## 2024-06-21 ENCOUNTER — TELEPHONE (OUTPATIENT)
Dept: ORTHOPEDICS | Facility: CLINIC | Age: 64
End: 2024-06-21
Payer: COMMERCIAL

## 2024-06-21 NOTE — TELEPHONE ENCOUNTER
----- Message from Vanna Bassett sent at 6/21/2024 10:33 AM CDT -----  Pt is checking to see if recent [employer] forms have been done as she is not getting paid until its done. She is not sure if it was sent to Dr Pemberton or Ernestina to fill out as she sees both. Call [phone]. Pt needs refill on pain meds.    Who Called: Angela Landaverde      Preferred Method of Contact: Phone Call  Patient's Preferred Phone Number on File: [phone]   Best Call Back Number, if different:  Additional Information:

## 2024-06-24 RX ORDER — TRAMADOL HYDROCHLORIDE 50 MG/1
50 TABLET ORAL EVERY 8 HOURS PRN
Qty: 20 TABLET | Refills: 0 | Status: SHIPPED | OUTPATIENT
Start: 2024-06-24

## 2024-06-25 ENCOUNTER — CLINICAL SUPPORT (OUTPATIENT)
Dept: REHABILITATION | Facility: HOSPITAL | Age: 64
End: 2024-06-25
Attending: NURSE PRACTITIONER
Payer: COMMERCIAL

## 2024-06-25 DIAGNOSIS — M62.81 QUADRICEPS WEAKNESS: ICD-10-CM

## 2024-06-25 DIAGNOSIS — Z96.651 STATUS POST TOTAL RIGHT KNEE REPLACEMENT: Primary | ICD-10-CM

## 2024-06-25 DIAGNOSIS — R26.9 GAIT DISTURBANCE: ICD-10-CM

## 2024-06-25 DIAGNOSIS — M25.661 DECREASED ROM OF RIGHT KNEE: ICD-10-CM

## 2024-06-25 PROCEDURE — 97112 NEUROMUSCULAR REEDUCATION: CPT | Mod: CQ

## 2024-06-25 PROCEDURE — 97110 THERAPEUTIC EXERCISES: CPT | Mod: CQ

## 2024-06-25 NOTE — PROGRESS NOTES
[facility]   Physical Therapy Treatment Note      Name: Angela Landaverde  Clinic Number: [id number]    Therapy Diagnosis:        Encounter Diagnoses   Name Primary?    Status post total right knee replacement Yes    Decreased ROM of right knee      Quadriceps weakness      Gait disturbance        Physician: Ernestina Ramirez FNP    Visit Date: 6/25/2024    Physician Orders: PT Eval and Treat   Medical Diagnosis from Referral: see above  Evaluation Date: 4/4/2024  Authorization Period Expiration: 7/30/2024   Plan of Care Expiration: 7/26/2024.      Date of Surgery: 3/11/2024  Visit # / Visits authorized:23/27    FOTO: 3/ 4 (40/54/40)     Precautions: Standard    PTA Visit #: 5/5     Time In: 11:42 am  Time Out:  12:23 pm  Total Billable Time:  40 billable minutes    Subjective     Pt reports:  she had a busy weekend with funerals and other things. Her knee feels stiff. She still has some pain but it is definitely feeling better.  She was compliant with home exercise program.  Response to previous treatment: no complaints   Functional change: no change noted    Pain: 2/10  Location: right knee      Objective      Objective Measures updated at progress report unless specified.     6/25/2024  Resting -8 at start    No lag with SLR   93 degrees flexion    Treatment     Angela received the treatments listed below:      therapeutic exercises to develop strength, endurance, ROM, flexibility, posture, and core stabilization for 15 minutes including:  NuStep x 5 minutes    Slant board x 2 minutes  Seated leg curl 5 plates 3 x 10 with 5 sec pull into ext  HS Stretch 3x30 sec - Cybex  Ideal stretch 10 x 10 second hold     Flexion rolls  MHP with Heel Prop x 10 minutes, 9# with ES (not performed today)       manual therapy techniques: Joint mobilizations, Manual traction, Myofacial release, Manual Lymphatic Drainage, Soft tissue Mobilization, and Friction Massage were applied to the: right leg for 0  "minutes, including:  Overpressure into extension in long sitting, scar massage, passive heel lifts  Scar massage, patellar mobs, and MFR to HS in prone hang position.       neuromuscular re-education activities to improve: Balance, Coordination, Kinesthetic Sense, Proprioception, and Posture for 25 minutes. The following activities were included:  Quad set 10sh x 5 min w/ NMES  (not performed today)   Straight leg raise 10sh x 5 min w/ NMES and strap (not performed today)   Short arc quad 10sh x 5 min w/ NMES and strap (not performed today)   Bilateral leg press 8 plates 3 x 10 w/ heel rock for terminal knee extension   Unilateral leg press 4 plates   Leg press up bilateral/down unilateral 5 plates x 30  Quad sets on slant board 10 x 10 second hold   Closed chain terminal knee extension 3 plates x 30      therapeutic activities to improve functional performance for 0 minutes, including:  -    (Not Today)  Step ups 6" forward x 30    gait training to improve functional mobility and safety for  0  billable minutes, including:  -    (Not Today)  In parallel bars with focus on heel strike and toe off in forward and retro (10 minutes total)    supervised modalities after being cleared for contradictions: NMES Electrical Stimulation:  Angela received NMES Electrical Stimulation to elicit muscle contraction of the right quad. Pt received stimulation at a rate of 80 pps with symmetric current, ramp of 3 seconds with 10 second on time and 20 second off time. Patient tolerated treatment well without any adverse effects. 0 min... 5 min each of QS, TKE and SLR      Patient Education and Home Exercises       Education provided:   - review of home exercise program and current Plan of Care/rationale of treatment.    Written Home Exercises Provided: Patient instructed to cont prior HEP. Exercises were reviewed and Angela was able to demonstrate them prior to the end of the session.  Angela demonstrated good understanding of the education " provided. See EMR under Patient Instructions for exercises provided during therapy sessions    Assessment     At Evaluation:  Angela is a 63 y.o. female referred to outpatient Physical Therapy with a medical diagnosis of s/p right total knee replacement . Patient presents with typical postop symptoms of swelling, pain, decreased range of motion, quad weakness, and gait disturbance. She is currently off work. To return to work she will be required to do a lot of walking, lifting garbage, sweeping and mopping and carrying food trays or push food carts.     Current Assessment:  Angela remains tight in extension and flexion. She has difficulty utilizing end range extension without cocontraction. She performed more functional activities in clinic today to attempt actively engaging quads without firing hamstrings. She was able to complete exercises without increase in pain. Will continue to progress as tolerated.    Angela Is progressing towards her goals.   Pt prognosis is Good.     Pt will continue to benefit from skilled outpatient physical therapy to address the deficits listed in the problem list box on initial evaluation, provide pt/family education and to maximize pt's level of independence in the home and community environment.     Pt's spiritual, cultural and educational needs considered and pt agreeable to plan of care and goals.     Anticipated barriers to physical therapy: none    Goals:   SHORT TERM GOALS - 4 weeks  1.  Patient to be independent with home exercise program to facilitate carryover between therapy visits.  2.  Patient will have  degrees range of motion right knee for improved mobiilty.  3.  Patient will perform straight leg raise without quad lag increasing from resting for increased quad control.  4.  Patient will increase manual muscle test of right lower extremity to 4+ to 5/5 for increased stability with gait and activiites of daily living.  5.  Patient will be able to get in and out of the  shower independently.     LONG TERM GOALS - 8 weeks  1.  Patient will go up/down stairs reciprocal pattern without handrails and good eccentric control on right lower extremity.  2.  Patient will ambulate independent without cane, without deviation and without pain.  3.  Patient will be able to get in and out of the bathtub independently to be able to take a tub bath.  4.  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     Plan     Plan of care Certification: 5/29/2024 to 7/26/2024.   Outpatient Physical Therapy 2 times weekly for 8 weeks to include the following interventions: Electrical Stimulation NMES, Gait Training, Manual Therapy, Moist Heat/ Ice, Neuromuscular Re-ed, Patient Education, Therapeutic Activities, Therapeutic Exercise, and Biofeedback and Vasopneumatic .     Norma San, PTA   06/25/2024

## 2024-06-26 ENCOUNTER — TELEPHONE (OUTPATIENT)
Dept: ORTHOPEDICS | Facility: CLINIC | Age: 64
End: 2024-06-26
Payer: COMMERCIAL

## 2024-06-26 NOTE — TELEPHONE ENCOUNTER
----- Message from Anh Dunaway sent at 6/26/2024 10:18 AM CDT -----  Regarding: PAPERS FROM [employer]  Who Called: Angela Landaverde        Who Left Message for Patient:  Does the patient know what this is regarding?:PAPERS FROM [employer]      Preferred Method of Contact: Phone Call  Patient's Preferred Phone Number on File: [phone]   Best Call Back Number, if different:  Additional Information: CALLING TO SEE IF [facility] RECEIVED THE PAPERS FROM [employer].

## 2024-06-27 ENCOUNTER — CLINICAL SUPPORT (OUTPATIENT)
Dept: REHABILITATION | Facility: HOSPITAL | Age: 64
End: 2024-06-27
Attending: NURSE PRACTITIONER
Payer: COMMERCIAL

## 2024-06-27 DIAGNOSIS — M62.81 QUADRICEPS WEAKNESS: ICD-10-CM

## 2024-06-27 DIAGNOSIS — R26.9 GAIT DISTURBANCE: ICD-10-CM

## 2024-06-27 DIAGNOSIS — M25.661 DECREASED ROM OF RIGHT KNEE: ICD-10-CM

## 2024-06-27 DIAGNOSIS — Z96.651 STATUS POST TOTAL RIGHT KNEE REPLACEMENT: Primary | ICD-10-CM

## 2024-06-27 PROCEDURE — 97110 THERAPEUTIC EXERCISES: CPT

## 2024-06-27 PROCEDURE — 97112 NEUROMUSCULAR REEDUCATION: CPT

## 2024-06-27 PROCEDURE — 97530 THERAPEUTIC ACTIVITIES: CPT

## 2024-06-27 NOTE — PROGRESS NOTES
[facility]   Physical Therapy Treatment Note      Name: Angela Landaverde  Clinic Number: [id number]    Therapy Diagnosis:        Encounter Diagnoses   Name Primary?    Status post total right knee replacement Yes    Decreased ROM of right knee      Quadriceps weakness      Gait disturbance        Physician: Ernestina Ramirez FNP    Visit Date: 6/27/2024    Physician Orders: PT Eval and Treat   Medical Diagnosis from Referral: see above  Evaluation Date: 4/4/2024  Authorization Period Expiration: 7/30/2024   Plan of Care Expiration: 7/26/2024.      Date of Surgery: 3/11/2024  Visit # / Visits authorized:24/27    FOTO: 3/ 4 (40/54/40)     Precautions: Standard    PTA Visit #: 0/5     Time In: 11:35 am  Time Out: 12:21 pm  Total Billable Time:  46 billable minutes    Subjective     Pt reports:  knee just has a little nagging ache in it  She was compliant with home exercise program.  Response to previous treatment: no complaints   Functional change: no change noted    Pain: 2/10  Location: right knee      Objective      Objective Measures updated at progress report unless specified.     6/25/2024  Resting -8 at start    No lag with SLR   93 degrees flexion    Treatment     Angela received the treatments listed below:      therapeutic exercises to develop strength, endurance, ROM, flexibility, posture, and core stabilization for 15 minutes including:  NuStep x 5 minutes    Slant board x 2 minutes  Seated leg curl 5 plates 3 x 10 w/ 5 sec pull into extension   HS Stretch 3x30 sec - Cybex    (Not today)  Ideal stretch 10 x 10 second hold     Flexion rolls  MHP with Heel Prop x 10 minutes, 9# with ES (not performed today)       manual therapy techniques: Joint mobilizations, Manual traction, Myofacial release, Manual Lymphatic Drainage, Soft tissue Mobilization, and Friction Massage were applied to the: right leg for 0 minutes, including:  Overpressure into extension in long sitting, scar massage,  "passive heel lifts  Scar massage, patellar mobs, and MFR to HS in prone hang position.       neuromuscular re-education activities to improve: Balance, Coordination, Kinesthetic Sense, Proprioception, and Posture for 21 minutes. The following activities were included:  Bilateral leg press 8 plates 3 x 10 w/ heel rock for terminal knee extension   Unilateral leg press 4 plates 3 x 10  Quad sets on slant board 10 x 10 second hold   Closed chain terminal knee extension 3 plates x 30  OKC knee extension - 2 plates 3 x 10    Leg press up bilateral/down unilateral 5 plates  Quad set 10sh x 5 min w/ NMES  (not performed today)   Straight leg raise 10sh x 5 min w/ NMES and strap (not performed today)   Short arc quad 10sh x 5 min w/ NMES and strap (not performed today)     therapeutic activities to improve functional performance for 10 minutes, including:  Step ups 6" forward x 30  Sit to stand from bench (21") 3 x 10 - without using upper extremities    gait training to improve functional mobility and safety for  0  billable minutes, including:  -    (Not Today)  In parallel bars with focus on heel strike and toe off in forward and retro (10 minutes total)    supervised modalities after being cleared for contradictions: NMES Electrical Stimulation:  Angela received NMES Electrical Stimulation to elicit muscle contraction of the right quad. Pt received stimulation at a rate of 80 pps with symmetric current, ramp of 3 seconds with 10 second on time and 20 second off time. Patient tolerated treatment well without any adverse effects. 0 min... 5 min each of QS, TKE and SLR      Patient Education and Home Exercises       Education provided:   - review of home exercise program and current Plan of Care/rationale of treatment.    Written Home Exercises Provided: Patient instructed to cont prior HEP. Exercises were reviewed and Angela was able to demonstrate them prior to the end of the session.  Angela demonstrated good understanding of " "the education provided. See EMR under Patient Instructions for exercises provided during therapy sessions    Assessment     At Evaluation:  Angela is a 63 y.o. female referred to outpatient Physical Therapy with a medical diagnosis of s/p right total knee replacement . Patient presents with typical postop symptoms of swelling, pain, decreased range of motion, quad weakness, and gait disturbance. She is currently off work. To return to work she will be required to do a lot of walking, lifting garbage, sweeping and mopping and carrying food trays or push food carts.     Current Assessment:  Angela remains tight in extension and flexion. She has difficulty utilizing end range extension without cocontraction. She performed more functional activities in clinic today to attempt actively engaging quads without firing hamstrings. Had her do step ups on 6" step focusing on using leg versus upper extremities. Had her perform sit to stands from 21" mat without using upper extremities. Added [place] knee extension today as well for more isolated quad strengthening. She was able to complete exercises without increase in pain. Will continue to progress as tolerated.    Angela Is progressing towards her goals.   Pt prognosis is Good.     Pt will continue to benefit from skilled outpatient physical therapy to address the deficits listed in the problem list box on initial evaluation, provide pt/family education and to maximize pt's level of independence in the home and community environment.     Pt's spiritual, cultural and educational needs considered and pt agreeable to plan of care and goals.     Anticipated barriers to physical therapy: none    Goals:   SHORT TERM GOALS - 4 weeks  1.  Patient to be independent with home exercise program to facilitate carryover between therapy visits.  2.  Patient will have  degrees range of motion right knee for improved mobiilty.  3.  Patient will perform straight leg raise without quad lag " increasing from resting for increased quad control.  4.  Patient will increase manual muscle test of right lower extremity to 4+ to 5/5 for increased stability with gait and activiites of daily living.  5.  Patient will be able to get in and out of the shower independently.     LONG TERM GOALS - 8 weeks  1.  Patient will go up/down stairs reciprocal pattern without handrails and good eccentric control on right lower extremity.  2.  Patient will ambulate independent without cane, without deviation and without pain.  3.  Patient will be able to get in and out of the bathtub independently to be able to take a tub bath.  4.  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     Plan     Plan of care Certification: 5/29/2024 to 7/26/2024.   Outpatient Physical Therapy 2 times weekly for 8 weeks to include the following interventions: Electrical Stimulation NMES, Gait Training, Manual Therapy, Moist Heat/ Ice, Neuromuscular Re-ed, Patient Education, Therapeutic Activities, Therapeutic Exercise, and Biofeedback and Vasopneumatic .     ANTONIO TRIANA, PT   06/27/2024

## 2024-07-01 ENCOUNTER — TELEPHONE (OUTPATIENT)
Dept: ORTHOPEDICS | Facility: CLINIC | Age: 64
End: 2024-07-01
Payer: COMMERCIAL

## 2024-07-01 ENCOUNTER — CLINICAL SUPPORT (OUTPATIENT)
Dept: REHABILITATION | Facility: HOSPITAL | Age: 64
End: 2024-07-01
Payer: COMMERCIAL

## 2024-07-01 DIAGNOSIS — M62.81 QUADRICEPS WEAKNESS: ICD-10-CM

## 2024-07-01 DIAGNOSIS — R26.9 GAIT DISTURBANCE: ICD-10-CM

## 2024-07-01 DIAGNOSIS — Z96.651 STATUS POST TOTAL RIGHT KNEE REPLACEMENT: Primary | ICD-10-CM

## 2024-07-01 DIAGNOSIS — M25.661 DECREASED ROM OF RIGHT KNEE: ICD-10-CM

## 2024-07-01 PROCEDURE — 97112 NEUROMUSCULAR REEDUCATION: CPT | Mod: CQ

## 2024-07-01 PROCEDURE — 97530 THERAPEUTIC ACTIVITIES: CPT | Mod: CQ

## 2024-07-01 PROCEDURE — 97110 THERAPEUTIC EXERCISES: CPT | Mod: CQ

## 2024-07-01 NOTE — TELEPHONE ENCOUNTER
----- Message from Vanna Bassett sent at 6/28/2024  1:45 PM CDT -----  Pt asking if any forms from [employer]. She had them fax to the other number you gave her.

## 2024-07-01 NOTE — PROGRESS NOTES
[facility]   Physical Therapy Treatment Note      Name: Angela Landaverde  Clinic Number: [id number]    Therapy Diagnosis:        Encounter Diagnoses   Name Primary?    Status post total right knee replacement Yes    Decreased ROM of right knee      Quadriceps weakness      Gait disturbance        Physician: Ernestina Ramirez FNP    Visit Date: 7/1/2024    Physician Orders: PT Eval and Treat   Medical Diagnosis from Referral: see above  Evaluation Date: 4/4/2024  Authorization Period Expiration: 7/30/2024   Plan of Care Expiration: 7/26/2024.      Date of Surgery: 3/11/2024  Visit # / Visits authorized:25/27    FOTO: 3/ 4 (40/54/40)     Precautions: Standard    PTA Visit #: 1/5     Time In: 11:33 am  Time Out: 12:18 pm  Total Billable Time:  45 billable minutes    Subjective     Pt reports:  she is planning to return to work on Thursday.  She was compliant with home exercise program.  Response to previous treatment: no complaints   Functional change: no change noted    Pain: 2/10  Location: right knee      Objective      Objective Measures updated at progress report unless specified.     7/1/2024  Resting -5 at start    No lag with SLR   90 degrees flexion    Treatment     Angela received the treatments listed below:      therapeutic exercises to develop strength, endurance, ROM, flexibility, posture, and core stabilization for 15 minutes including:  NuStep x 5 minutes    Slant board x 3 minutes  Seated leg curl 5 plates 3 x 10 w/ 5 sec pull into extension   HS Stretch 3x30 sec - Cybex  Ideal stretch 10 x 10 second hold       manual therapy techniques: Joint mobilizations, Manual traction, Myofacial release, Manual Lymphatic Drainage, Soft tissue Mobilization, and Friction Massage were applied to the: right leg for 0 minutes, including:  Overpressure into extension in long sitting, scar massage, passive heel lifts  Scar massage, patellar mobs, and MFR to HS in prone hang position.    "    neuromuscular re-education activities to improve: Balance, Coordination, Kinesthetic Sense, Proprioception, and Posture for 20 minutes. The following activities were included:  Bilateral leg press 8 plates 3 x 10 w/ heel rock for terminal knee extension   Unilateral leg press 4 plates 3 x 10  Quad sets on slant board 10 x 10 second hold   Closed chain terminal knee extension 3 plates x 30  OKC knee extension - 2 plates 3 x 10    Leg press up bilateral/down unilateral 5 plates  Quad set 10sh x 5 min w/ NMES  (not performed today)   Straight leg raise 10sh x 5 min w/ NMES and strap (not performed today)   Short arc quad 10sh x 5 min w/ NMES and strap (not performed today)     therapeutic activities to improve functional performance for 10 minutes, including:  Step ups 6" forward x 30  Sit to stand from bench (21") 3 x 10 - without using upper extremities    gait training to improve functional mobility and safety for  0  billable minutes, including:  -    (Not Today)  In parallel bars with focus on heel strike and toe off in forward and retro (10 minutes total)    supervised modalities after being cleared for contradictions: NMES Electrical Stimulation:  Angela received NMES Electrical Stimulation to elicit muscle contraction of the right quad. Pt received stimulation at a rate of 80 pps with symmetric current, ramp of 3 seconds with 10 second on time and 20 second off time. Patient tolerated treatment well without any adverse effects. 0 min... 5 min each of QS, TKE and SLR      Patient Education and Home Exercises       Education provided:   - review of home exercise program and current Plan of Care/rationale of treatment.    Written Home Exercises Provided: Patient instructed to cont prior HEP. Exercises were reviewed and Angela was able to demonstrate them prior to the end of the session.  Angela demonstrated good understanding of the education provided. See EMR under Patient Instructions for exercises provided during " therapy sessions    Assessment     At Evaluation:  Angela is a 63 y.o. female referred to outpatient Physical Therapy with a medical diagnosis of s/p right total knee replacement . Patient presents with typical postop symptoms of swelling, pain, decreased range of motion, quad weakness, and gait disturbance. She is currently off work. To return to work she will be required to do a lot of walking, lifting garbage, sweeping and mopping and carrying food trays or push food carts.     Current Assessment:  Angela remains tight in extension and flexion. She has difficulty utilizing end range extension without cocontraction. She has one more PT appt before returning to work. Sit to stands remain challenging. She was able to complete exercises without increase in pain. Will continue to progress as tolerated.    Angela Is progressing towards her goals.   Pt prognosis is Good.     Pt will continue to benefit from skilled outpatient physical therapy to address the deficits listed in the problem list box on initial evaluation, provide pt/family education and to maximize pt's level of independence in the home and community environment.     Pt's spiritual, cultural and educational needs considered and pt agreeable to plan of care and goals.     Anticipated barriers to physical therapy: none    Goals:   SHORT TERM GOALS - 4 weeks  1.  Patient to be independent with home exercise program to facilitate carryover between therapy visits.  2.  Patient will have  degrees range of motion right knee for improved mobiilty.  3.  Patient will perform straight leg raise without quad lag increasing from resting for increased quad control.  4.  Patient will increase manual muscle test of right lower extremity to 4+ to 5/5 for increased stability with gait and activiites of daily living.  5.  Patient will be able to get in and out of the shower independently.     LONG TERM GOALS - 8 weeks  1.  Patient will go up/down stairs reciprocal pattern  without handrails and good eccentric control on right lower extremity.  2.  Patient will ambulate independent without cane, without deviation and without pain.  3.  Patient will be able to get in and out of the bathtub independently to be able to take a tub bath.  4.  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     Plan     Plan of care Certification: 5/29/2024 to 7/26/2024.   Outpatient Physical Therapy 2 times weekly for 8 weeks to include the following interventions: Electrical Stimulation NMES, Gait Training, Manual Therapy, Moist Heat/ Ice, Neuromuscular Re-ed, Patient Education, Therapeutic Activities, Therapeutic Exercise, and Biofeedback and Vasopneumatic .     Norma San, PTA   07/01/2024

## 2024-07-02 ENCOUNTER — TELEPHONE (OUTPATIENT)
Dept: ORTHOPEDICS | Facility: CLINIC | Age: 64
End: 2024-07-02
Payer: COMMERCIAL

## 2024-07-02 NOTE — TELEPHONE ENCOUNTER
----- Message from Vanna Bassett sent at 7/2/2024 11:31 AM CDT -----  [facility] rec[facility]. Thanks!  Who Called: Angela Landaverde    Caller is requesting assistance/information from provider's office.    Preferred Method of Contact: Phone Call  Patient's Preferred Phone Number on File: [phone]   Best Call Back Number, if different:  Additional Information:

## 2024-07-02 NOTE — TELEPHONE ENCOUNTER
Patient called about paperwork. She states that they are telling her that they have not received it. We faxed to the only number on the paperwork twice with confirmation sheets. There is not phone number for the company or an email to submit to. Patient will get more information for us to try to submit another way.    ----- Message from Vanna Bassett sent at 7/2/2024  2:38 PM CDT -----  Pt said [employer] still doesn't have it and they said to email it. Email is on the cover page per pt. Let pt know when done.   Who Called: Angela Landaverde    Caller is requesting assistance/information from provider's office.    Preferred Method of Contact: Phone Call  Patient's Preferred Phone Number on File: [phone]   Best Call Back Number, if different:  Additional Information:

## 2024-07-03 ENCOUNTER — OFFICE VISIT (OUTPATIENT)
Dept: FAMILY MEDICINE | Facility: CLINIC | Age: 64
End: 2024-07-03
Payer: COMMERCIAL

## 2024-07-03 ENCOUNTER — CLINICAL SUPPORT (OUTPATIENT)
Dept: CARDIOLOGY | Facility: CLINIC | Age: 64
End: 2024-07-03
Payer: COMMERCIAL

## 2024-07-03 VITALS
SYSTOLIC BLOOD PRESSURE: 129 MMHG | RESPIRATION RATE: 16 BRPM | BODY MASS INDEX: 42.38 KG/M2 | TEMPERATURE: 98 F | OXYGEN SATURATION: 98 % | HEIGHT: 67 IN | HEART RATE: 48 BPM | DIASTOLIC BLOOD PRESSURE: 76 MMHG | WEIGHT: 270 LBS

## 2024-07-03 DIAGNOSIS — R00.1 BRADYCARDIA: ICD-10-CM

## 2024-07-03 DIAGNOSIS — I10 ESSENTIAL HYPERTENSION: Primary | ICD-10-CM

## 2024-07-03 DIAGNOSIS — G89.29 OTHER CHRONIC PAIN: ICD-10-CM

## 2024-07-03 DIAGNOSIS — M10.9 GOUT, UNSPECIFIED CAUSE, UNSPECIFIED CHRONICITY, UNSPECIFIED SITE: ICD-10-CM

## 2024-07-03 DIAGNOSIS — E55.9 VITAMIN D DEFICIENCY: ICD-10-CM

## 2024-07-03 DIAGNOSIS — E03.9 HYPOTHYROIDISM, UNSPECIFIED TYPE: ICD-10-CM

## 2024-07-03 PROCEDURE — 3008F BODY MASS INDEX DOCD: CPT | Mod: CPTII,,, | Performed by: FAMILY MEDICINE

## 2024-07-03 PROCEDURE — 3078F DIAST BP <80 MM HG: CPT | Mod: CPTII,,, | Performed by: FAMILY MEDICINE

## 2024-07-03 PROCEDURE — G2211 COMPLEX E/M VISIT ADD ON: HCPCS | Mod: ,,, | Performed by: FAMILY MEDICINE

## 2024-07-03 PROCEDURE — 99212 OFFICE O/P EST SF 10 MIN: CPT | Mod: PBBFAC,25

## 2024-07-03 PROCEDURE — 3074F SYST BP LT 130 MM HG: CPT | Mod: CPTII,,, | Performed by: FAMILY MEDICINE

## 2024-07-03 PROCEDURE — 93010 ELECTROCARDIOGRAM REPORT: CPT | Mod: S$PBB,,, | Performed by: INTERNAL MEDICINE

## 2024-07-03 PROCEDURE — 93005 ELECTROCARDIOGRAM TRACING: CPT | Mod: PBBFAC | Performed by: INTERNAL MEDICINE

## 2024-07-03 PROCEDURE — 99999 PR PBB SHADOW E&M-EST. PATIENT-LVL II: CPT | Mod: PBBFAC,,,

## 2024-07-03 PROCEDURE — 99214 OFFICE O/P EST MOD 30 MIN: CPT | Mod: ,,, | Performed by: FAMILY MEDICINE

## 2024-07-03 PROCEDURE — 80053 COMPREHEN METABOLIC PANEL: CPT | Mod: ,,, | Performed by: CLINICAL MEDICAL LABORATORY

## 2024-07-03 RX ORDER — ASPIRIN 325 MG
50000 TABLET, DELAYED RELEASE (ENTERIC COATED) ORAL WEEKLY
Qty: 12 CAPSULE | Refills: 1 | Status: SHIPPED | OUTPATIENT
Start: 2024-07-03

## 2024-07-03 RX ORDER — GABAPENTIN 300 MG/1
300 CAPSULE ORAL DAILY PRN
Qty: 90 CAPSULE | Refills: 1 | Status: SHIPPED | OUTPATIENT
Start: 2024-07-03 | End: 2024-07-10

## 2024-07-03 RX ORDER — ALLOPURINOL 100 MG/1
100 TABLET ORAL DAILY
Qty: 90 TABLET | Refills: 1 | Status: SHIPPED | OUTPATIENT
Start: 2024-07-03

## 2024-07-03 RX ORDER — ATENOLOL AND CHLORTHALIDONE TABLET 50; 25 MG/1; MG/1
1 TABLET ORAL DAILY
Qty: 90 TABLET | Refills: 1 | Status: SHIPPED | OUTPATIENT
Start: 2024-07-03

## 2024-07-03 RX ORDER — METHOCARBAMOL 750 MG/1
TABLET, FILM COATED ORAL
Qty: 30 TABLET | Refills: 4 | Status: SHIPPED | OUTPATIENT
Start: 2024-07-03 | End: 2024-07-10

## 2024-07-03 RX ORDER — LEVOTHYROXINE SODIUM 88 UG/1
88 TABLET ORAL
Qty: 90 TABLET | Refills: 1 | Status: SHIPPED | OUTPATIENT
Start: 2024-07-03

## 2024-07-04 LAB
OHS QRS DURATION: 78 MS
OHS QTC CALCULATION: 396 MS

## 2024-07-05 LAB
ALBUMIN SERPL BCP-MCNC: 3.8 G/DL (ref 3.5–5)
ALBUMIN/GLOB SERPL: 1.1 {RATIO}
ALP SERPL-CCNC: 62 U/L (ref 50–130)
ALT SERPL W P-5'-P-CCNC: 13 U/L (ref 13–56)
ANION GAP SERPL CALCULATED.3IONS-SCNC: 9 MMOL/L (ref 7–16)
AST SERPL W P-5'-P-CCNC: 12 U/L (ref 15–37)
BILIRUB SERPL-MCNC: 0.8 MG/DL (ref ?–1.2)
BUN SERPL-MCNC: 10 MG/DL (ref 7–18)
BUN/CREAT SERPL: 11 (ref 6–20)
CALCIUM SERPL-MCNC: 9.6 MG/DL (ref 8.5–10.1)
CHLORIDE SERPL-SCNC: 105 MMOL/L (ref 98–107)
CO2 SERPL-SCNC: 31 MMOL/L (ref 21–32)
CREAT SERPL-MCNC: 0.92 MG/DL (ref 0.55–1.02)
EGFR (NO RACE VARIABLE) (RUSH/TITUS): 71 ML/MIN/1.73M2
GLOBULIN SER-MCNC: 3.6 G/DL (ref 2–4)
GLUCOSE SERPL-MCNC: 110 MG/DL (ref 74–106)
POTASSIUM SERPL-SCNC: 3.9 MMOL/L (ref 3.5–5.1)
PROT SERPL-MCNC: 7.4 G/DL (ref 6.4–8.2)
SODIUM SERPL-SCNC: 141 MMOL/L (ref 136–145)

## 2024-07-08 RX ORDER — HYDROCODONE BITARTRATE AND ACETAMINOPHEN 10; 325 MG/1; MG/1
1 TABLET ORAL EVERY 6 HOURS PRN
Qty: 20 TABLET | Refills: 0 | Status: SHIPPED | OUTPATIENT
Start: 2024-07-08 | End: 2024-07-10

## 2024-07-10 ENCOUNTER — OFFICE VISIT (OUTPATIENT)
Dept: CARDIOLOGY | Facility: CLINIC | Age: 64
End: 2024-07-10
Payer: COMMERCIAL

## 2024-07-10 VITALS
WEIGHT: 275 LBS | DIASTOLIC BLOOD PRESSURE: 90 MMHG | BODY MASS INDEX: 43.16 KG/M2 | SYSTOLIC BLOOD PRESSURE: 160 MMHG | HEIGHT: 67 IN | RESPIRATION RATE: 18 BRPM | HEART RATE: 43 BPM

## 2024-07-10 DIAGNOSIS — I10 PRIMARY HYPERTENSION: ICD-10-CM

## 2024-07-10 DIAGNOSIS — R00.1 BRADYCARDIA: Primary | ICD-10-CM

## 2024-07-10 DIAGNOSIS — Z96.651 STATUS POST TOTAL RIGHT KNEE REPLACEMENT: Primary | ICD-10-CM

## 2024-07-10 PROCEDURE — 3008F BODY MASS INDEX DOCD: CPT | Mod: CPTII,,, | Performed by: STUDENT IN AN ORGANIZED HEALTH CARE EDUCATION/TRAINING PROGRAM

## 2024-07-10 PROCEDURE — 1159F MED LIST DOCD IN RCRD: CPT | Mod: CPTII,,, | Performed by: STUDENT IN AN ORGANIZED HEALTH CARE EDUCATION/TRAINING PROGRAM

## 2024-07-10 PROCEDURE — 99203 OFFICE O/P NEW LOW 30 MIN: CPT | Mod: S$PBB,,, | Performed by: STUDENT IN AN ORGANIZED HEALTH CARE EDUCATION/TRAINING PROGRAM

## 2024-07-10 PROCEDURE — 99213 OFFICE O/P EST LOW 20 MIN: CPT | Mod: PBBFAC | Performed by: STUDENT IN AN ORGANIZED HEALTH CARE EDUCATION/TRAINING PROGRAM

## 2024-07-10 PROCEDURE — 3080F DIAST BP >= 90 MM HG: CPT | Mod: CPTII,,, | Performed by: STUDENT IN AN ORGANIZED HEALTH CARE EDUCATION/TRAINING PROGRAM

## 2024-07-10 PROCEDURE — 99999 PR PBB SHADOW E&M-EST. PATIENT-LVL III: CPT | Mod: PBBFAC,,, | Performed by: STUDENT IN AN ORGANIZED HEALTH CARE EDUCATION/TRAINING PROGRAM

## 2024-07-10 PROCEDURE — 3077F SYST BP >= 140 MM HG: CPT | Mod: CPTII,,, | Performed by: STUDENT IN AN ORGANIZED HEALTH CARE EDUCATION/TRAINING PROGRAM

## 2024-07-10 NOTE — ASSESSMENT & PLAN NOTE
Sinus bradycardia  Thyroid function normal  On atenolol  Has good chronotropic competence  No changes required

## 2024-07-10 NOTE — PROGRESS NOTES
PCP: Joana Parmar DO    Referring Provider:     Subjective:   Angela Landaverde is a 63 y.o. female with hx of bradycardia, hypothyroidism, obesity, OA s/p right knee surgery  who presents for evaluation of bradycardia.     Patient referred for bradycardia. Resting HR in 40s. Patient denies any symptoms. With ambulation her HR elevates to 85 bpm. Patient reassured. Thryoid function is normal. Continue atenolol for BP.     Fhx: non-contributary  Shx: Denies smoking, etoh or drug use    EKG - 7/3/24 - Sinus bradycardia    ECHO - Results for orders placed during the hospital encounter of 03/11/24      Left Ventricle: The left ventricle is normal in size. Mildly increased wall thickness. There is concentric hypertrophy. There is normal systolic function with a visually estimated ejection fraction of 60 - 65%. Ejection fraction by visual approximation is 60%. There is normal diastolic function.    Right Ventricle: Normal right ventricular cavity size. Systolic function is normal.    Right Atrium: Right atrium is mildly dilated.    Aortic Valve: The aortic valve is a trileaflet valve. Mildly calcified cusps.    Mitral Valve: There is mild bileaflet sclerosis. Mildly thickened leaflets.    Tricuspid Valve: There is mild regurgitation.    Pulmonary Artery: The estimated pulmonary artery systolic pressure is 27 mmHg.    IVC/SVC: Normal venous pressure at 3 mmHg.      CATH - No results found for this or any previous visit.      Stress -     Lab Results   Component Value Date     07/03/2024    K 3.9 07/03/2024     07/03/2024    CO2 31 07/03/2024    BUN 10 07/03/2024    CREATININE 0.92 07/03/2024    CALCIUM 9.6 07/03/2024    ANIONGAP 9 07/03/2024    ESTGFRAFRICA 98 05/18/2021    EGFRNONAA 77 02/16/2022       Lab Results   Component Value Date    CHOL 178 09/05/2023    CHOL 184 02/16/2022     Lab Results   Component Value Date    HDL 74 (H) 09/05/2023    HDL 75 (H) 02/16/2022     Lab Results   Component Value Date     "LDLCALC 94 09/05/2023    LDLCALC 100 02/16/2022     Lab Results   Component Value Date    TRIG 52 09/05/2023    TRIG 47 02/16/2022     Lab Results   Component Value Date    CHOLHDL 2.4 09/05/2023    CHOLHDL 2.5 02/16/2022       Lab Results   Component Value Date    WBC 9.52 03/12/2024    HGB 10.3 (L) 03/12/2024    HCT 30.9 (L) 03/12/2024    MCV 87.8 03/12/2024     03/12/2024           Current Outpatient Medications:     allopurinoL (ZYLOPRIM) 100 MG tablet, Take 1 tablet (100 mg total) by mouth once daily., Disp: 90 tablet, Rfl: 1    atenoloL-chlorthalidone (TENORETIC) 50-25 mg Tab, Take 1 tablet by mouth once daily., Disp: 90 tablet, Rfl: 1    celecoxib (CELEBREX) 200 MG capsule, Take 1 capsule (200 mg total) by mouth 2 (two) times daily., Disp: 60 capsule, Rfl: 2    cholecalciferol, vitamin D3, 1,250 mcg (50,000 unit) capsule, Take 1 capsule (50,000 Units total) by mouth once a week., Disp: 12 capsule, Rfl: 1    levothyroxine (SYNTHROID) 88 MCG tablet, Take 1 tablet (88 mcg total) by mouth before breakfast., Disp: 90 tablet, Rfl: 1    traMADoL (ULTRAM) 50 mg tablet, Take 1 tablet (50 mg total) by mouth every 8 (eight) hours as needed for Pain., Disp: 20 tablet, Rfl: 0    traMADoL (ULTRAM) 50 mg tablet, Take 1 tablet (50 mg total) by mouth every 8 (eight) hours as needed for Pain., Disp: 20 tablet, Rfl: 0    Review of Systems   Respiratory:  Negative for cough and shortness of breath.    Cardiovascular:  Negative for chest pain, palpitations, orthopnea, claudication, leg swelling and PND.         Objective:   BP (!) 160/90   Pulse (!) 43   Resp 18   Ht 5' 7" (1.702 m)   Wt 124.7 kg (275 lb)   BMI 43.07 kg/m²     Physical Exam  Constitutional:       Appearance: Normal appearance.   Cardiovascular:      Rate and Rhythm: Normal rate and regular rhythm.      Pulses: Normal pulses.      Heart sounds: Normal heart sounds. No murmur heard.  Pulmonary:      Effort: Pulmonary effort is normal.      Breath " sounds: Normal breath sounds.   Musculoskeletal:         General: No swelling.   Neurological:      Mental Status: She is alert and oriented to person, place, and time.           Assessment:     1. Bradycardia        2. Primary hypertension              Plan:   Bradycardia  Sinus bradycardia  Thyroid function normal  On atenolol  Has good chronotropic competence  No changes required    HTN (hypertension)  Uncontrolled  Recent knee surgery

## 2024-07-11 ENCOUNTER — OFFICE VISIT (OUTPATIENT)
Dept: ORTHOPEDICS | Facility: CLINIC | Age: 64
End: 2024-07-11
Payer: COMMERCIAL

## 2024-07-11 ENCOUNTER — HOSPITAL ENCOUNTER (OUTPATIENT)
Dept: RADIOLOGY | Facility: HOSPITAL | Age: 64
Discharge: HOME | End: 2024-07-11
Attending: ORTHOPAEDIC SURGERY
Payer: COMMERCIAL

## 2024-07-11 DIAGNOSIS — Z96.651 STATUS POST TOTAL RIGHT KNEE REPLACEMENT: ICD-10-CM

## 2024-07-11 DIAGNOSIS — E66.01 SEVERE OBESITY (BMI >= 40): ICD-10-CM

## 2024-07-11 DIAGNOSIS — Z96.651 STATUS POST TOTAL RIGHT KNEE REPLACEMENT: Primary | ICD-10-CM

## 2024-07-11 PROCEDURE — 99999 PR PBB SHADOW E&M-EST. PATIENT-LVL II: CPT | Mod: PBBFAC,,, | Performed by: ORTHOPAEDIC SURGERY

## 2024-07-11 PROCEDURE — 99212 OFFICE O/P EST SF 10 MIN: CPT | Mod: PBBFAC,25 | Performed by: ORTHOPAEDIC SURGERY

## 2024-07-11 PROCEDURE — 99024 POSTOP FOLLOW-UP VISIT: CPT | Mod: ,,, | Performed by: ORTHOPAEDIC SURGERY

## 2024-07-11 PROCEDURE — 73562 X-RAY EXAM OF KNEE 3: CPT | Mod: TC,RT

## 2024-07-15 ENCOUNTER — CLINICAL SUPPORT (OUTPATIENT)
Dept: REHABILITATION | Facility: HOSPITAL | Age: 64
End: 2024-07-15
Payer: COMMERCIAL

## 2024-07-15 DIAGNOSIS — Z96.651 STATUS POST TOTAL RIGHT KNEE REPLACEMENT: Primary | ICD-10-CM

## 2024-07-15 DIAGNOSIS — M62.81 QUADRICEPS WEAKNESS: ICD-10-CM

## 2024-07-15 DIAGNOSIS — R26.9 GAIT DISTURBANCE: ICD-10-CM

## 2024-07-15 DIAGNOSIS — M25.661 DECREASED ROM OF RIGHT KNEE: ICD-10-CM

## 2024-07-15 PROCEDURE — 97140 MANUAL THERAPY 1/> REGIONS: CPT | Mod: CQ

## 2024-07-15 PROCEDURE — 97112 NEUROMUSCULAR REEDUCATION: CPT | Mod: CQ

## 2024-07-15 PROCEDURE — 97110 THERAPEUTIC EXERCISES: CPT | Mod: CQ

## 2024-07-15 NOTE — PROGRESS NOTES
[facility]   Physical Therapy Treatment Note      Name: Angela Landaverde  Clinic Number: [id number]    Therapy Diagnosis:        Encounter Diagnoses   Name Primary?    Status post total right knee replacement Yes    Decreased ROM of right knee      Quadriceps weakness      Gait disturbance        Physician: Mark Pemberton MD    Visit Date: 7/15/2024    Physician Orders: PT Eval and Treat   Medical Diagnosis from Referral: see above  Evaluation Date: 4/4/2024  Authorization Period Expiration: 7/30/2024   Plan of Care Expiration: 7/26/2024.      Date of Surgery: 3/11/2024  Visit # / Visits authorized:26/27    FOTO: 3/ 4 (40/54/40)     Precautions: Standard    PTA Visit #: 2/5     Time In: 2:30 pm  Time Out:  3:18 pm  Total Billable Time:  45 billable minutes (3 min non billed)    Subjective     Pt reports: her knee felt better. She reports pain after working 12 hour shifts.   She was compliant with home exercise program.  Response to previous treatment: no complaints   Functional change: no change noted    Pain: 6/10  Location: right knee      Objective      7/1/2024  Resting -12 degrees extension at start   -3 degree lag to -15 with SLR   85 degrees flexion     -5 degrees extension after Ideal stretch with -5 degree lag with SLR     Treatment     Angela received the treatments listed below:      therapeutic exercises to develop strength, endurance, ROM, flexibility, posture, and core stabilization for 15 minutes including:  NuStep x 5 minutes    Slant board x 3 minutes  Seated leg curl 5 plates 3 x 10 w/ 5 sec pull into extension   HS Stretch 3x30 sec - Cybex  Ideal stretch 10 x 10 second hold     Prone quad sets in prone hang position--2 x 10    manual therapy techniques: Joint mobilizations, Manual traction, Myofacial release, Manual Lymphatic Drainage, Soft tissue Mobilization, and Friction Massage were applied to the: right leg for 8 minutes, including:  MFR to R HS on prone hang position  "    neuromuscular re-education activities to improve: Balance, Coordination, Kinesthetic Sense, Proprioception, and Posture for 22 minutes. The following activities were included:  Bilateral leg press 8 plates 3 x 10 w/ heel rock for terminal knee extension   Unilateral leg press 4 plates 3 x 10  Quad sets on slant board 10 x 10 second hold   Closed chain terminal knee extension 3 plates x 30  OKC knee extension - 2 plates 3 x 10    Leg press up bilateral/down unilateral 5 plates  Quad set 10sh x 5 min w/ NMES  (not performed today)   Straight leg raise 10sh x 5 min w/ NMES and strap (not performed today)   Short arc quad 10sh x 5 min w/ NMES and strap (not performed today)     therapeutic activities to improve functional performance for 10 minutes, including:  Step ups 6" forward x 30  Sit to stand from bench (21") 3 x 10 - without using upper extremities    gait training to improve functional mobility and safety for  0  billable minutes, including:  -    (Not Today)  In parallel bars with focus on heel strike and toe off in forward and retro (10 minutes total)    supervised modalities after being cleared for contradictions: NMES Electrical Stimulation:  Angela received NMES Electrical Stimulation to elicit muscle contraction of the right quad. Pt received stimulation at a rate of 80 pps with symmetric current, ramp of 3 seconds with 10 second on time and 20 second off time. Patient tolerated treatment well without any adverse effects. 0 min... 5 min each of QS, TKE and SLR      Patient Education and Home Exercises       Education provided:   - review of home exercise program and current Plan of Care/rationale of treatment.    Written Home Exercises Provided: Patient instructed to cont prior HEP. Exercises were reviewed and Angela was able to demonstrate them prior to the end of the session.  Angela demonstrated good understanding of the education provided. See EMR under Patient Instructions for exercises provided during " therapy sessions    Assessment     At Evaluation:  Angela is a 63 y.o. female referred to outpatient Physical Therapy with a medical diagnosis of s/p right total knee replacement . Patient presents with typical postop symptoms of swelling, pain, decreased range of motion, quad weakness, and gait disturbance. She is currently off work. To return to work she will be required to do a lot of walking, lifting garbage, sweeping and mopping and carrying food trays or push food carts.     Current Assessment:  Angela arrived with 6/10 pain in R knee.  Reports she still has pain after working 12 hours @ work.  Angela remains tight in extension and flexion. She has difficulty utilizing end range extension without cocontraction. Was able to get to -5 degrees extension after stretching.  She has one more PT appt approved.  S was able to complete exercises without increase in pain. Will DC next session due to insurance limitations.      Angela Is progressing towards her goals.   Pt prognosis is Good.     Pt will continue to benefit from skilled outpatient physical therapy to address the deficits listed in the problem list box on initial evaluation, provide pt/family education and to maximize pt's level of independence in the home and community environment.     Pt's spiritual, cultural and educational needs considered and pt agreeable to plan of care and goals.     Anticipated barriers to physical therapy: none    Goals:   SHORT TERM GOALS - 4 weeks  1.  Patient to be independent with home exercise program to facilitate carryover between therapy visits.  2.  Patient will have  degrees range of motion right knee for improved mobiilty.  3.  Patient will perform straight leg raise without quad lag increasing from resting for increased quad control.  4.  Patient will increase manual muscle test of right lower extremity to 4+ to 5/5 for increased stability with gait and activiites of daily living.  5.  Patient will be able to get in and  out of the shower independently.     LONG TERM GOALS - 8 weeks  1.  Patient will go up/down stairs reciprocal pattern without handrails and good eccentric control on right lower extremity.  2.  Patient will ambulate independent without cane, without deviation and without pain.  3.  Patient will be able to get in and out of the bathtub independently to be able to take a tub bath.  4.  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     Plan     Plan of care Certification: 5/29/2024 to 7/26/2024.   Outpatient Physical Therapy 2 times weekly for 8 weeks to include the following interventions: Electrical Stimulation NMES, Gait Training, Manual Therapy, Moist Heat/ Ice, Neuromuscular Re-ed, Patient Education, Therapeutic Activities, Therapeutic Exercise, and Biofeedback and Vasopneumatic .     Jadiel Campbell, PTA   07/15/2024

## 2024-07-15 NOTE — PROGRESS NOTES
Post-Operative Total Knee        Manipulation, Knee - Right 5/24/2024      Pt is here today for Second post-operative followup of her Manipulation, Knee - Right.  she is doing okay.  She is back to work but states he is having difficult time mobilizing at work.  We have reviewed her findings and discussed plan of care and future treatment options, including the physical therapy plan.        Physical Exam:     The affected knee was inspected today.   The wound is healing well with no signs of erythema or warmth.  There is no drainage.  No clinical signs or symptoms of infection are present.   ROM:  Range of motion 10 to 85° today  -Daniela's sign.  2+ pulses are present.         Assessment and Plan  1. Status post total right knee replacement    2. Severe obesity (BMI >= 40) [E66.01]        She remains quite stiff.  She has not responded as well as I would like to manipulation.  Discussed the fact that she may require arthroscopic lysis of adhesions if she does not improve I would like to continue physical therapy for an additional few weeks her hamstrings are quite tight.  I will see her back in 4-6 weeks for recheck    We will continue post-operative physical therapy until the patient feels that they are independent with a home rehab program.  Return to work status/ return to activities status was discussed today in detail. All questions were answered.    The use of stockings and DVT prophylaxis was discussed.  Will follow up in additional 4-6 weeks with radiographs at that time.     There are no Patient Instructions on file for this visit.

## 2024-07-17 PROBLEM — E55.9 VITAMIN D DEFICIENCY: Status: ACTIVE | Noted: 2024-07-17

## 2024-07-17 PROBLEM — M10.9 GOUT: Status: ACTIVE | Noted: 2024-07-17

## 2024-07-17 NOTE — PROGRESS NOTES
[facility]    Chief Complaint      Chief Complaint   Patient presents with    Follow-up     3 month        History of Present Illness      Angela Landaverde is a 63 y.o. female that  has a past medical history of Arthritis, Hypertension, and Thyroid disease.     HPI    The patient presents today for a three month follow up visit for hypertension.        Past Medical History:  Past Medical History:   Diagnosis Date    Arthritis     Hypertension     Thyroid disease        Past Surgical History:   has a past surgical history that includes  section; Tubal ligation; robotic arthroplasty, knee (Right, 3/11/2024); and Knee joint manipulation (Right, 2024).    Social History:  Social History     Tobacco Use    Smoking status: Never    Smokeless tobacco: Never   Substance Use Topics    Alcohol use: Not Currently     Comment: occasionally    Drug use: Never       I personally reviewed all past medical, surgical, and social.     Review of Systems   Constitutional:  Negative for chills and fever.   HENT:  Negative for ear pain and sore throat.    Eyes:  Negative for blurred vision.   Respiratory:  Negative for cough and shortness of breath.    Cardiovascular:  Negative for chest pain and palpitations.   Gastrointestinal:  Negative for abdominal pain and constipation.   Genitourinary:  Negative for dysuria and hematuria.   Musculoskeletal:  Negative for back pain and falls.   Skin:  Negative for itching and rash.   Neurological:  Negative for weakness and headaches.   Endo/Heme/Allergies:  Negative for polydipsia. Does not bruise/bleed easily.   Psychiatric/Behavioral:  Negative for suicidal ideas. The patient does not have insomnia.         Medications:  Outpatient Encounter Medications as of 7/3/2024   Medication Sig Note Dispense Refill    traMADoL (ULTRAM) 50 mg tablet Take 1 tablet (50 mg total) by mouth every 8 (eight) hours as needed for Pain.  20 tablet 0    [DISCONTINUED] allopurinoL (ZYLOPRIM) 100 MG  tablet Take 1 tablet (100 mg total) by mouth once daily.  90 tablet 1    [DISCONTINUED] atenoloL-chlorthalidone (TENORETIC) 50-25 mg Tab Take 1 tablet by mouth once daily.  90 tablet 1    [DISCONTINUED] celecoxib (CELEBREX) 200 MG capsule Take 1 capsule (200 mg total) by mouth 2 (two) times daily.  60 capsule 2    [DISCONTINUED] cholecalciferol, vitamin D3, 1,250 mcg (50,000 unit) capsule Take 1 capsule (50,000 Units total) by mouth once a week.  12 capsule 1    [DISCONTINUED] diclofenac sodium (VOLTAREN) 1 % Gel APPLY 2 GRAMS TO AFFECTED AREAS EVERY 6 HOURS AS NEEDED (Patient not taking: Reported on 7/10/2024)  50 g 3    [DISCONTINUED] gabapentin (NEURONTIN) 300 MG capsule Take 1 capsule (300 mg total) by mouth daily as needed (pain).  90 capsule 1    [DISCONTINUED] levothyroxine (SYNTHROID) 88 MCG tablet Take 1 tablet (88 mcg total) by mouth before breakfast.  90 tablet 1    [DISCONTINUED] methocarbamoL (ROBAXIN) 750 MG Tab TAKE 1 TABLET BY MOUTH 4 TIMES DAILY AS NEEDED  30 tablet 4    [DISCONTINUED] ondansetron (ZOFRAN-ODT) 4 MG TbDL Take 1 tablet (4 mg total) by mouth every 6 (six) hours as needed (nausea). (Patient not taking: Reported on 7/10/2024)  30 tablet 0    allopurinoL (ZYLOPRIM) 100 MG tablet Take 1 tablet (100 mg total) by mouth once daily.  90 tablet 1    atenoloL-chlorthalidone (TENORETIC) 50-25 mg Tab Take 1 tablet by mouth once daily.  90 tablet 1    celecoxib (CELEBREX) 200 MG capsule Take 1 capsule (200 mg total) by mouth 2 (two) times daily. 7/10/2024: Takes one a day 60 capsule 2    cholecalciferol, vitamin D3, 1,250 mcg (50,000 unit) capsule Take 1 capsule (50,000 Units total) by mouth once a week.  12 capsule 1    levothyroxine (SYNTHROID) 88 MCG tablet Take 1 tablet (88 mcg total) by mouth before breakfast.  90 tablet 1    traMADoL (ULTRAM) 50 mg tablet Take 1 tablet (50 mg total) by mouth every 8 (eight) hours as needed for Pain.  20 tablet 0    [DISCONTINUED] aspirin (ECOTRIN) 81 MG EC  tablet Take 1 tablet (81 mg total) by mouth 2 (two) times a day. (Patient not taking: Reported on 7/3/2024)  60 tablet 0    [DISCONTINUED] gabapentin (NEURONTIN) 300 MG capsule Take 1 capsule (300 mg total) by mouth daily as needed (pain). (Patient not taking: Reported on 7/10/2024)  90 capsule 1    [DISCONTINUED] HYDROcodone-acetaminophen (NORCO)  mg per tablet Take 1 tablet by mouth every 6 (six) hours as needed for Pain. (Patient not taking: Reported on 7/10/2024)  20 tablet 0    [DISCONTINUED] methocarbamoL (ROBAXIN) 750 MG Tab TAKE 1 TABLET BY MOUTH 4 TIMES DAILY AS NEEDED (Patient not taking: Reported on 7/10/2024)  30 tablet 4    [DISCONTINUED] nystatin-triamcinolone (MYCOLOG II) cream Apply 1 each topically. (Patient not taking: Reported on 7/3/2024)       [DISCONTINUED] ondansetron (ZOFRAN-ODT) 4 MG TbDL Take 2 tablets (8 mg total) by mouth every 8 (eight) hours as needed (Nausea). (Patient not taking: Reported on 7/3/2024)  30 tablet 0    [DISCONTINUED] oxyCODONE-acetaminophen (PERCOCET)  mg per tablet Take 1 tablet by mouth every 6 (six) hours as needed for Pain. (Patient not taking: Reported on 7/3/2024)  30 tablet 0    [DISCONTINUED] senna-docusate 8.6-50 mg (PERICOLACE) 8.6-50 mg per tablet Take 1 tablet by mouth 2 (two) times daily. (Patient not taking: Reported on 7/3/2024)  60 tablet 2    [DISCONTINUED] sulfamethoxazole-trimethoprim 800-160mg (BACTRIM DS) 800-160 mg Tab Take 1 tablet by mouth 2 (two) times daily. (Patient not taking: Reported on 7/3/2024)  14 tablet 0     No facility-administered encounter medications on file as of 7/3/2024.       Allergies:  Review of patient's allergies indicates:  No Known Allergies    Health Maintenance:  Immunization History   Administered Date(s) Administered    COVID-19 MRNA, LN-S PF (MODERNA HALF 0.25 ML DOSE) 01/06/2022    COVID-19, MRNA, LN-S, PF (Pfizer) (Purple Cap) 05/07/2021, 05/28/2021    Influenza - Quadrivalent - PF *Preferred* (6 months  "and older) 12/13/2023    Tdap 09/05/2023      Health Maintenance   Topic Date Due    Shingles Vaccine (1 of 2) Never done    Colorectal Cancer Screening  08/05/2023    Mammogram  11/15/2024    Lipid Panel  09/05/2028    TETANUS VACCINE  09/05/2033    Hepatitis C Screening  Completed        Physical Exam      Vital Signs  Temp: 97.5 °F (36.4 °C)  Pulse: (!) 48  Resp: 16  SpO2: 98 %  BP: 129/76  Pain Score:   7  Height and Weight  Height: 5' 7" (170.2 cm)  Weight: 122.5 kg (270 lb)  BSA (Calculated - sq m): 2.41 sq meters  BMI (Calculated): 42.3  Weight in (lb) to have BMI = 25: 159.3]    Physical Exam  Vitals and nursing note reviewed.   Constitutional:       Appearance: Normal appearance.   HENT:      Head: Normocephalic and atraumatic.      Nose: Nose normal.   Eyes:      Extraocular Movements: Extraocular movements intact.      Conjunctiva/sclera: Conjunctivae normal.      Pupils: Pupils are equal, round, and reactive to light.   Cardiovascular:      Rate and Rhythm: Regular rhythm. Bradycardia present.      Heart sounds: Normal heart sounds.   Pulmonary:      Effort: Pulmonary effort is normal.      Breath sounds: Normal breath sounds.   Musculoskeletal:      Right lower leg: No edema.      Left lower leg: No edema.   Skin:     Findings: No rash.   Neurological:      General: No focal deficit present.      Mental Status: She is alert and oriented to person, place, and time. Mental status is at baseline.      Cranial Nerves: No cranial nerve deficit.      Gait: Gait normal.   Psychiatric:         Mood and Affect: Mood normal.         Behavior: Behavior normal.         Thought Content: Thought content normal.         Judgment: Judgment normal.          Laboratory:  CBC:  Recent Labs   Lab 01/30/23  1446 02/29/24  0904 03/12/24  0506   WBC 7.20 3.78 L 9.52   RBC 4.34 4.49 3.52 L   Hemoglobin 12.5 12.9 10.3 L   Hematocrit 38.9 38.2 30.9 L   Platelet Count 260 228 193   MCV 89.6 85.1 87.8   MCH 28.8 28.7 29.3   MCHC " 32.1 33.8 33.3     CMP:  Recent Labs   Lab 09/05/23  1351 02/29/24  0904 03/12/24  0506 07/03/24  1249   Glucose 92 100   < > 110 H   Calcium 8.9 9.4   < > 9.6   Albumin 3.6 3.6  --  3.8   Total Protein 6.8 7.3  --  7.4   Sodium 144 141   < > 141   Potassium 4.3 3.6   < > 3.9   CO2 33 H 34 H   < > 31   Chloride 105 103   < > 105   BUN 12 17   < > 10   Alk Phos 58 56  --  62   ALT 19 19  --  13   AST 18 15  --  12 L   Bilirubin, Total 0.6 0.8  --  0.8    < > = values in this interval not displayed.     LIPIDS:  Recent Labs   Lab 02/16/22  0914 08/30/22  0848 11/07/22  1300 09/05/23  1351 03/12/24  0506   TSH 3.210   < > 1.860 1.490 0.351 L   HDL Cholesterol 75 H  --   --  74 H  --    Cholesterol 184  --   --  178  --    Triglycerides 47  --   --  52  --    LDL Calculated 100  --   --  94  --    Cholesterol/HDL Ratio (Risk Factor) 2.5  --   --  2.4  --    Non-  --   --  104  --     < > = values in this interval not displayed.     TSH:  Recent Labs   Lab 11/07/22  1300 09/05/23  1351 03/12/24  0506   TSH 1.860 1.490 0.351 L     A1C:  Recent Labs   Lab 09/05/23  1351   Hemoglobin A1C 5.1       Assessment/Plan     Angela Landaverde is a 63 y.o.female with:    1. Essential hypertension  -     atenoloL-chlorthalidone (TENORETIC) 50-25 mg Tab; Take 1 tablet by mouth once daily.  Dispense: 90 tablet; Refill: 1  -     Comprehensive Metabolic Panel; Future; Expected date: 07/03/2024    2. Gout, unspecified cause, unspecified chronicity, unspecified site  -     allopurinoL (ZYLOPRIM) 100 MG tablet; Take 1 tablet (100 mg total) by mouth once daily.  Dispense: 90 tablet; Refill: 1    3. Hypothyroidism, unspecified type  -     levothyroxine (SYNTHROID) 88 MCG tablet; Take 1 tablet (88 mcg total) by mouth before breakfast.  Dispense: 90 tablet; Refill: 1    4. Vitamin D deficiency  -     cholecalciferol, vitamin D3, 1,250 mcg (50,000 unit) capsule; Take 1 capsule (50,000 Units total) by mouth once a week.  Dispense: 12 capsule;  Refill: 1    5. Other chronic pain  -  methocarbamoL (ROBAXIN) 750 MG Tab; TAKE 1 TABLET BY MOUTH 4 TIMES DAILY AS NEEDED (Patient not taking: Reported on 7/10/2024)  Dispense: 30 tablet; Refill: 4  -   gabapentin (NEURONTIN) 300 MG capsule; Take 1 capsule (300 mg total) by mouth daily as needed (pain). (Patient not taking: Reported on 7/10/2024)  Dispense: 90 capsule; Refill: 1  -   HYDROcodone-acetaminophen (NORCO)  mg per tablet; Take 1 tablet by mouth every 6 (six) hours as needed for Pain. (Patient not taking: Reported on 7/10/2024)  Dispense: 20 tablet; Refill: 0    6. Bradycardia  -     Ambulatory referral/consult to Cardiology  -     EKG 12-lead; Future    Other orders      Visit today included increased complexity associated with managing the longitudinal care of the patient due to the serious and/or complex managed problem(s) of  hypertension and gout come in.     Chronic conditions status updated as per HPI.  Other than changes above, cont current medications and maintain follow up with specialists.  Return to clinic in 3 month(s) for medication refills.    Joana Parmar DO  [facility]

## 2024-07-22 ENCOUNTER — TELEPHONE (OUTPATIENT)
Dept: FAMILY MEDICINE | Facility: CLINIC | Age: 64
End: 2024-07-22
Payer: COMMERCIAL

## 2024-07-22 NOTE — TELEPHONE ENCOUNTER
----- Message from Joana Parmar DO sent at 7/17/2024  1:18 PM CDT -----  The patient's EKG was normal except for bradycardia.  I am going to refer the patient to Cardiology.

## 2024-07-24 ENCOUNTER — CLINICAL SUPPORT (OUTPATIENT)
Dept: REHABILITATION | Facility: HOSPITAL | Age: 64
End: 2024-07-24
Payer: COMMERCIAL

## 2024-07-24 DIAGNOSIS — M62.81 QUADRICEPS WEAKNESS: ICD-10-CM

## 2024-07-24 DIAGNOSIS — Z96.651 STATUS POST TOTAL RIGHT KNEE REPLACEMENT: Primary | ICD-10-CM

## 2024-07-24 DIAGNOSIS — M25.661 DECREASED ROM OF RIGHT KNEE: ICD-10-CM

## 2024-07-24 DIAGNOSIS — R26.9 GAIT DISTURBANCE: ICD-10-CM

## 2024-07-24 PROCEDURE — 97110 THERAPEUTIC EXERCISES: CPT | Mod: CQ

## 2024-07-24 PROCEDURE — 97112 NEUROMUSCULAR REEDUCATION: CPT | Mod: CQ

## 2024-07-24 PROCEDURE — 97140 MANUAL THERAPY 1/> REGIONS: CPT | Mod: CQ

## 2024-07-24 NOTE — PROGRESS NOTES
[facility]   Physical Therapy Treatment Note      Name: Angela Landaverde  Clinic Number: [id number]    Therapy Diagnosis:        Encounter Diagnoses   Name Primary?    Status post total right knee replacement Yes    Decreased ROM of right knee      Quadriceps weakness      Gait disturbance        Physician: Mark Pemberton MD    Visit Date: 7/24/2024    Physician Orders: PT Eval and Treat   Medical Diagnosis from Referral: see above  Evaluation Date: 4/4/2024  Authorization Period Expiration: 7/30/2024   Plan of Care Expiration: 7/26/2024.      Date of Surgery: 3/11/2024  Visit # / Visits authorized:27/27    FOTO: 3/ 4 (40/54/40)     Precautions: Standard    PTA Visit #: 3/5     Time In: 2:35 pm  Time Out:  3:40 pm  Total Billable Time:  60 billable minutes     Subjective     Pt reports: her doctor said he might need to go back in and use a smaller prosthetic. He is not happy with her flexion.   She was compliant with home exercise program.  Response to previous treatment: no complaints   Functional change: no change noted    Pain: 6/10  Location: right knee      Objective      7/24/2024  Resting -10 degrees extension at start    lag to -15 with SLR   85 degrees flexion     -5 degrees extension after Ideal stretch with -5 degree lag with SLR     Treatment     Angela received the treatments listed below:      therapeutic exercises to develop strength, endurance, ROM, flexibility, posture, and core stabilization for 19 minutes including:  NuStep x 5 minutes    Slant board x 3 minutes  Seated leg curl 5 plates 3 x 10 w/ 5 sec pull into extension   HS Stretch 3x30 sec - Cybex  Ideal stretch 10 x 10 second hold     Prone quad sets in prone hang position--2 x 10    manual therapy techniques: Joint mobilizations, Manual traction, Myofacial release, Manual Lymphatic Drainage, Soft tissue Mobilization, and Friction Massage were applied to the: right leg for 19 minutes, including:  MFR to R HS on prone  "hang position   Myofascial release to quads in flexion   Kinesiotape in figure 8 pattern both for encouragement in knee extension and scar management    neuromuscular re-education activities to improve: Balance, Coordination, Kinesthetic Sense, Proprioception, and Posture for 22 minutes. The following activities were included:  Bilateral leg press 8 plates 3 x 10 w/ heel rock for terminal knee extension   Unilateral leg press 4 plates 3 x 10  Quad sets on slant board 10 x 10 second hold   Closed chain terminal knee extension 3 plates   OKC knee extension - 2 plates 3 x 10    (not performed today)   Leg press up bilateral/down unilateral 5 plates  Quad set 10sh x 5 min w/ NMES  (not performed today)   Straight leg raise 10sh x 5 min w/ NMES and strap (not performed today)   Short arc quad 10sh x 5 min w/ NMES and strap (not performed today)     therapeutic activities to improve functional performance for 0 minutes, including:  Step ups 6" forward x 30  Sit to stand from bench (21") 3 x 10 - without using upper extremities    gait training to improve functional mobility and safety for  0  billable minutes, including:  -    (Not Today)  In parallel bars with focus on heel strike and toe off in forward and retro (10 minutes total)    supervised modalities after being cleared for contradictions: NMES Electrical Stimulation:  Angela received NMES Electrical Stimulation to elicit muscle contraction of the right quad. Pt received stimulation at a rate of 80 pps with symmetric current, ramp of 3 seconds with 10 second on time and 20 second off time. Patient tolerated treatment well without any adverse effects. 0 min... 5 min each of QS, TKE and SLR      Patient Education and Home Exercises       Education provided:   - review of home exercise program and current Plan of Care/rationale of treatment.    Written Home Exercises Provided: Patient instructed to cont prior HEP. Exercises were reviewed and Angela was able to demonstrate " them prior to the end of the session.  Angela demonstrated good understanding of the education provided. See EMR under Patient Instructions for exercises provided during therapy sessions    Assessment     At Evaluation:  Angela is a 63 y.o. female referred to outpatient Physical Therapy with a medical diagnosis of s/p right total knee replacement . Patient presents with typical postop symptoms of swelling, pain, decreased range of motion, quad weakness, and gait disturbance. She is currently off work. To return to work she will be required to do a lot of walking, lifting garbage, sweeping and mopping and carrying food trays or push food carts.     Current Assessment:  Angela arrived for her last approved PT visit. She reported her surgeon would like her to continue PT at this time. He is considering going back and doing a scope or replacing the hardware. She remains short of her goals for both extension and flexion range of motion. She also continues to demonstrate a quad lag. She received a significant amount of stretching and myofascial release to both quads and hamstrings in an effort to improve range of motion. Plan of Care will be updated and more visits requested.    Angela Is progressing towards her goals.   Pt prognosis is Good.     Pt will continue to benefit from skilled outpatient physical therapy to address the deficits listed in the problem list box on initial evaluation, provide pt/family education and to maximize pt's level of independence in the home and community environment.     Pt's spiritual, cultural and educational needs considered and pt agreeable to plan of care and goals.     Anticipated barriers to physical therapy: none    Goals:   SHORT TERM GOALS - 4 weeks  1.  Patient to be independent with home exercise program to facilitate carryover between therapy visits.  2.  Patient will have  degrees range of motion right knee for improved mobiilty.  3.  Patient will perform straight leg raise  without quad lag increasing from resting for increased quad control.  4.  Patient will increase manual muscle test of right lower extremity to 4+ to 5/5 for increased stability with gait and activiites of daily living.  5.  Patient will be able to get in and out of the shower independently.     LONG TERM GOALS - 8 weeks  1.  Patient will go up/down stairs reciprocal pattern without handrails and good eccentric control on right lower extremity.  2.  Patient will ambulate independent without cane, without deviation and without pain.  3.  Patient will be able to get in and out of the bathtub independently to be able to take a tub bath.  4.  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     Plan     Plan of care Certification: 5/29/2024 to 7/26/2024.   Outpatient Physical Therapy 2 times weekly for 8 weeks to include the following interventions: Electrical Stimulation NMES, Gait Training, Manual Therapy, Moist Heat/ Ice, Neuromuscular Re-ed, Patient Education, Therapeutic Activities, Therapeutic Exercise, and Biofeedback and Vasopneumatic .     Norma San, PTA   07/24/2024

## 2024-08-01 ENCOUNTER — CLINICAL SUPPORT (OUTPATIENT)
Dept: REHABILITATION | Facility: HOSPITAL | Age: 64
End: 2024-08-01
Payer: COMMERCIAL

## 2024-08-01 DIAGNOSIS — M62.81 QUADRICEPS WEAKNESS: ICD-10-CM

## 2024-08-01 DIAGNOSIS — M25.661 DECREASED ROM OF RIGHT KNEE: ICD-10-CM

## 2024-08-01 DIAGNOSIS — Z96.651 STATUS POST TOTAL RIGHT KNEE REPLACEMENT: Primary | ICD-10-CM

## 2024-08-01 DIAGNOSIS — R26.9 GAIT DISTURBANCE: ICD-10-CM

## 2024-08-01 PROCEDURE — 97112 NEUROMUSCULAR REEDUCATION: CPT

## 2024-08-01 PROCEDURE — 97110 THERAPEUTIC EXERCISES: CPT

## 2024-08-01 PROCEDURE — 97530 THERAPEUTIC ACTIVITIES: CPT

## 2024-08-04 ENCOUNTER — PATIENT MESSAGE (OUTPATIENT)
Dept: ADMINISTRATIVE | Facility: HOSPITAL | Age: 64
End: 2024-08-04

## 2024-08-05 DIAGNOSIS — Z12.11 SCREENING FOR COLON CANCER: ICD-10-CM

## 2024-08-09 ENCOUNTER — CLINICAL SUPPORT (OUTPATIENT)
Dept: REHABILITATION | Facility: HOSPITAL | Age: 64
End: 2024-08-09
Payer: COMMERCIAL

## 2024-08-09 DIAGNOSIS — R26.9 GAIT DISTURBANCE: ICD-10-CM

## 2024-08-09 DIAGNOSIS — M25.661 DECREASED ROM OF RIGHT KNEE: ICD-10-CM

## 2024-08-09 DIAGNOSIS — M62.81 QUADRICEPS WEAKNESS: ICD-10-CM

## 2024-08-09 DIAGNOSIS — Z96.651 STATUS POST TOTAL RIGHT KNEE REPLACEMENT: Primary | ICD-10-CM

## 2024-08-09 PROCEDURE — 97140 MANUAL THERAPY 1/> REGIONS: CPT | Mod: CQ

## 2024-08-09 PROCEDURE — 97530 THERAPEUTIC ACTIVITIES: CPT | Mod: CQ

## 2024-08-09 PROCEDURE — 97112 NEUROMUSCULAR REEDUCATION: CPT | Mod: CQ

## 2024-08-15 ENCOUNTER — CLINICAL SUPPORT (OUTPATIENT)
Dept: REHABILITATION | Facility: HOSPITAL | Age: 64
End: 2024-08-15
Payer: COMMERCIAL

## 2024-08-15 DIAGNOSIS — M62.81 QUADRICEPS WEAKNESS: ICD-10-CM

## 2024-08-15 DIAGNOSIS — M25.661 DECREASED ROM OF RIGHT KNEE: ICD-10-CM

## 2024-08-15 DIAGNOSIS — Z96.651 STATUS POST TOTAL RIGHT KNEE REPLACEMENT: Primary | ICD-10-CM

## 2024-08-15 DIAGNOSIS — R26.9 GAIT DISTURBANCE: ICD-10-CM

## 2024-08-15 PROCEDURE — 97140 MANUAL THERAPY 1/> REGIONS: CPT | Mod: CQ

## 2024-08-15 PROCEDURE — 97112 NEUROMUSCULAR REEDUCATION: CPT | Mod: CQ

## 2024-08-15 PROCEDURE — 97110 THERAPEUTIC EXERCISES: CPT | Mod: CQ

## 2024-08-15 NOTE — PROGRESS NOTES
[facility]   Physical Therapy Treatment Note      Name: Angela Landaverde  Clinic Number: [id number]    Therapy Diagnosis:        Encounter Diagnoses   Name Primary?    Status post total right knee replacement Yes    Decreased ROM of right knee      Quadriceps weakness      Gait disturbance        Physician: Mark Pemberton MD    Visit Date: 8/15/2024    Physician Orders: PT Eval and Treat   Medical Diagnosis from Referral: see above  Evaluation Date: 4/4/2024  Authorization Period Expiration: 8/30/2024   Plan of Care Expiration: 8/23/2024      Date of Surgery: 3/11/2024  Visit # / Visits authorized:30/31    FOTO: 4/ 4 (40/54/40/36)     Precautions: Standard    PTA Visit #: 2/5     Time In: 4:45 pm  Time Out: 5:29 pm  Total Billable Time:  44 billable minutes     Subjective     Pt reports: it comes and goes. By the end of the day her legs swell in the calves.   She was compliant with home exercise program.  Response to previous treatment: no complaints   Functional change: no change noted    Pain: 6/10  Location: right knee      Objective      8/15/2024   -5 degrees extension after Ideal stretch with -4 degree lag with SLR     Treatment     Angela received the treatments listed below:      therapeutic exercises to develop strength, endurance, ROM, flexibility, posture, and core stabilization for 14 billable minutes including:  NuStep x 5 minutes    Slant board x 2 minutes  Seated leg curl 5 plates 3 x 10    HS Stretch 3x30 sec - Cybex  Ideal stretch 10 x 10 second hold     Prone hangs - 3# x 3 min  Prone quad sets -5sh - 2 x 10    manual therapy techniques: Joint mobilizations, Manual traction, Myofacial release, Manual Lymphatic Drainage, Soft tissue Mobilization, and Friction Massage were applied to the: right leg for 8 minutes, including:  MFR to R HS on prone hang position   Myofascial release to quads in flexion   Kinesiotape in figure 8 pattern both for encouragement in knee extension and  "scar management (not performed today)     neuromuscular re-education activities to improve: Balance, Coordination, Kinesthetic Sense, Proprioception, and Posture for 18 billable minutes. The following activities were included:  Bilateral leg press 10 plates x 10 w/ heel rock for terminal knee extension, 9 plates 2 x 10  Unilateral leg press 4 plates 3 x 10  Quad sets on slant board 10 x 10 second hold   Closed chain terminal knee extension 3 plates   OKC knee extension - 2 plates 3 x 10    therapeutic activities to improve functional performance for 4 billable minutes, including:  Step ups 6" forward x 30  Sit to stand from bench (21")  - without using upper extremities    gait training to improve functional mobility and safety for  0  billable minutes, including:  -    (Not Today)  In parallel bars with focus on heel strike and toe off in forward and retro (10 minutes total)    supervised modalities after being cleared for contradictions: NMES Electrical Stimulation:  Angela received NMES Electrical Stimulation to elicit muscle contraction of the right quad. Pt received stimulation at a rate of 80 pps with symmetric current, ramp of 3 seconds with 10 second on time and 20 second off time. Patient tolerated treatment well without any adverse effects. 0 min... 5 min each of QS, TKE and SLR      Patient Education and Home Exercises       Education provided:   - review of home exercise program and current Plan of Care/rationale of treatment.    Written Home Exercises Provided: Patient instructed to cont prior HEP. Exercises were reviewed and Angela was able to demonstrate them prior to the end of the session.  Angela demonstrated good understanding of the education provided. See EMR under Patient Instructions for exercises provided during therapy sessions    Assessment     At Evaluation:  Angela is a 63 y.o. female referred to outpatient Physical Therapy with a medical diagnosis of s/p right total knee replacement . Patient " presents with typical postop symptoms of swelling, pain, decreased range of motion, quad weakness, and gait disturbance. She is currently off work. To return to work she will be required to do a lot of walking, lifting garbage, sweeping and mopping and carrying food trays or push food carts.     Current Assessment:  Angela has one more appt approved by insurance at this time. She remains tight in both extension and flexion despite best efforts at stretching.  She also continues to have swelling in leg after work. Her scar was more pliable today with lighter adhesions noticed in inferior portion. Will reassess next visit.    Angela Is progressing towards her goals.   Pt prognosis is Good.     Pt will continue to benefit from skilled outpatient physical therapy to address the deficits listed in the problem list box on initial evaluation, provide pt/family education and to maximize pt's level of independence in the home and community environment.     Pt's spiritual, cultural and educational needs considered and pt agreeable to plan of care and goals.     Anticipated barriers to physical therapy: none    Goals:   SHORT TERM GOALS - 4 weeks  1.  Patient to be independent with home exercise program to facilitate carryover between therapy visits.  2.  Patient will have  degrees range of motion right knee for improved mobiilty.  3.  Patient will perform straight leg raise without quad lag increasing from resting for increased quad control.  4.  Patient will increase manual muscle test of right lower extremity to 4+ to 5/5 for increased stability with gait and activiites of daily living.  5.  Patient will be able to get in and out of the shower independently.     LONG TERM GOALS - 8 weeks  1.  Patient will go up/down stairs reciprocal pattern without handrails and good eccentric control on right lower extremity.  2.  Patient will ambulate independent without cane, without deviation and without pain.  3.  Patient will be  able to get in and out of the bathtub independently to be able to take a tub bath.  4.  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     Plan     Plan of care Certification: 7/26/2024 to 8/23/2024  Outpatient Physical Therapy 2 times weekly for 4 weeks to include the following interventions: Electrical Stimulation NMES, Gait Training, Manual Therapy, Moist Heat/ Ice, Neuromuscular Re-ed, Patient Education, Therapeutic Activities, Therapeutic Exercise, and Biofeedback and Vasopneumatic .     Norma San, PTA   08/15/2024

## 2024-08-21 DIAGNOSIS — Z96.651 STATUS POST TOTAL RIGHT KNEE REPLACEMENT: Primary | ICD-10-CM

## 2024-08-22 ENCOUNTER — OFFICE VISIT (OUTPATIENT)
Dept: ORTHOPEDICS | Facility: CLINIC | Age: 64
End: 2024-08-22
Payer: COMMERCIAL

## 2024-08-22 ENCOUNTER — HOSPITAL ENCOUNTER (OUTPATIENT)
Dept: RADIOLOGY | Facility: HOSPITAL | Age: 64
Discharge: HOME | End: 2024-08-22
Attending: ORTHOPAEDIC SURGERY
Payer: COMMERCIAL

## 2024-08-22 DIAGNOSIS — Z96.651 STATUS POST TOTAL RIGHT KNEE REPLACEMENT: ICD-10-CM

## 2024-08-22 DIAGNOSIS — M17.12 PRIMARY OSTEOARTHRITIS OF LEFT KNEE: Primary | ICD-10-CM

## 2024-08-22 PROCEDURE — 99212 OFFICE O/P EST SF 10 MIN: CPT | Mod: PBBFAC,25 | Performed by: ORTHOPAEDIC SURGERY

## 2024-08-22 PROCEDURE — 99999 PR PBB SHADOW E&M-EST. PATIENT-LVL II: CPT | Mod: PBBFAC,,, | Performed by: ORTHOPAEDIC SURGERY

## 2024-08-22 PROCEDURE — 99999PBSHW PR PBB SHADOW TECHNICAL ONLY FILED TO HB: Mod: PBBFAC,,,

## 2024-08-22 PROCEDURE — 73562 X-RAY EXAM OF KNEE 3: CPT | Mod: 26,RT,, | Performed by: ORTHOPAEDIC SURGERY

## 2024-08-22 PROCEDURE — 73562 X-RAY EXAM OF KNEE 3: CPT | Mod: TC,RT

## 2024-08-22 RX ORDER — TRIAMCINOLONE ACETONIDE 40 MG/ML
40 INJECTION, SUSPENSION INTRA-ARTICULAR; INTRAMUSCULAR
Status: DISCONTINUED | OUTPATIENT
Start: 2024-08-22 | End: 2024-08-22 | Stop reason: HOSPADM

## 2024-08-22 RX ORDER — BUPIVACAINE HYDROCHLORIDE 5 MG/ML
3 INJECTION, SOLUTION PERINEURAL
Status: DISCONTINUED | OUTPATIENT
Start: 2024-08-22 | End: 2024-08-22 | Stop reason: HOSPADM

## 2024-08-22 RX ADMIN — BUPIVACAINE HYDROCHLORIDE 3 ML: 5 INJECTION, SOLUTION PERINEURAL at 09:08

## 2024-08-22 RX ADMIN — TRIAMCINOLONE ACETONIDE 40 MG: 400 INJECTION, SUSPENSION INTRA-ARTICULAR; INTRAMUSCULAR at 09:08

## 2024-08-22 NOTE — PROGRESS NOTES
HISTORY OF PRESENT ILLNESS:       Manipulation, Knee - Right 5/24/2024   TKA, knee, right 3/11/2024      Pt is here today for Third post-operative followup of her knee arthroscopy.      she is doing well.    She is still having swelling, especially below her knee.   She has 2 more PT visits left.     She is also complaining of left knee pain.   She would like to discuss getting an injection today.     We have reviewed her findings and discussed plan of care and future treatment options, including the physical therapy plan.                                                                                     PHYSICAL EXAMINATION:     Incision sites healed well  No evidence of any erythema, infection or induration  Range of motion 3-100  Minimal effusion  2+ DP pulse  No swelling, no calf tenderness  - Daniela's sign  Negative medial joint line tendernes  Moderate quad atrophy    X-Ray Knee AP LAT with Sunrise Right    Result Date: 8/22/2024  See Procedure Notes for results. IMPRESSION: Please see Ortho procedure notes for report.  This procedure was auto-finalized by: Virtual Radiologist   Three views right knee were obtained today demonstrating cemented total knee prosthesis in satisfactory alignment                                                                               ASSESSMENT:                                                                                                                                               1. Status post above, doing well.                                                                                                                               PLAN:                                                                                                                                                     1. Okay to complete physical therapy.  Left knee injection given today.  Right knee still a bit stiff should continue to improve with range of motion activities.  Did ultimately  discuss the fact that if motion does not improve could consider polyethylene exchange he is a bit tight in both extension and flexion.  She wishes to hold off on any intervention at this time she is overall fairly satisfied but wishes she had more motion  2. Emphasized quad function.  3. I have discussed return to activity in detail.  4. she will see us back in 6 months                                      5. All questions were answered and she should contact us if she  has any questions or concerns in the interim.              There are no Patient Instructions on file for this visit.

## 2024-08-22 NOTE — PROCEDURES
Large Joint Aspiration/Injection    Date/Time: 8/22/2024 9:45 AM    Performed by: Mark Pemberton MD  Authorized by: Mark Pemberton MD    Consent Done?:  Yes (Verbal)  Indications:  Pain    Details:  Needle Size:  22 G  Approach:  Superior  Medications:  3 mL BUPivacaine 0.5 % (5 mg/mL); 40 mg triamcinolone acetonide 40 mg/mL  Patient tolerance:  Patient tolerated the procedure well with no immediate complications

## 2024-10-03 ENCOUNTER — DOCUMENTATION ONLY (OUTPATIENT)
Dept: REHABILITATION | Facility: HOSPITAL | Age: 64
End: 2024-10-03
Payer: COMMERCIAL

## 2024-10-03 PROBLEM — R26.9 GAIT DISTURBANCE: Status: RESOLVED | Noted: 2024-04-04 | Resolved: 2024-10-03

## 2024-10-03 PROBLEM — Z96.651 STATUS POST TOTAL RIGHT KNEE REPLACEMENT: Status: RESOLVED | Noted: 2024-04-04 | Resolved: 2024-10-03

## 2024-10-03 PROBLEM — M62.81 QUADRICEPS WEAKNESS: Status: RESOLVED | Noted: 2024-04-04 | Resolved: 2024-10-03

## 2024-10-03 PROBLEM — M25.661 DECREASED ROM OF RIGHT KNEE: Status: RESOLVED | Noted: 2024-04-04 | Resolved: 2024-10-03

## 2024-10-03 NOTE — PROGRESS NOTES
[facility]   Physical Therapy Discharge Summary      Name: Angela Landaverde  Clinic Number: [id number]     Therapy Diagnosis:           Encounter Diagnoses   Name Primary?    Status post total right knee replacement Yes    Decreased ROM of right knee      Quadriceps weakness      Gait disturbance           Physician: Mark Pemberton MD     Last Visit Date: 8/15/2024     Physician Orders: PT Eval and Treat   Medical Diagnosis from Referral: see above  Evaluation Date: 4/4/2024  Authorization Period Expiration: 8/30/2024   Plan of Care Expiration: 8/23/2024      Date of Surgery: 3/11/2024  Visit # / Visits authorized:30/31    FOTO: 4/ 4 (40/54/40/36)     Precautions: Standard    Last physical therapy treatment performed by Norma San PTA.    Angela has one more appt approved by insurance at this time. She remains tight in both extension and flexion despite best efforts at stretching.  She also continues to have swelling in leg after work.    Goals:   SHORT TERM GOALS - 4 weeks  1.  Patient to be independent with home exercise program to facilitate carryover between therapy visits. MET  2.  Patient will have  degrees range of motion right knee for improved mobiilty. Able to get to -5 degrees extension with aggressive stretching but usually arrived with -10 to -12 extension; she was able to get 98 degrees flexion at best  3.  Patient will perform straight leg raise without quad lag increasing from resting for increased quad control. NOT MET due to resting extension lag  4.  Patient will increase manual muscle test of right lower extremity to 4+ to 5/5 for increased stability with gait and activiites of daily living. IMPROVING/ONGOING  5.  Patient will be able to get in and out of the shower independently. MET     LONG TERM GOALS - 8 weeks  1.  Patient will go up/down stairs reciprocal pattern without handrails and good eccentric control on right lower extremity. Able but must use  handrails and still has some difficulty due to lack of range of motion of right knee  2.  Patient will ambulate independent without cane, without deviation and without pain. Ambulating without cane but does still have some mild antalgia on right due to poor range of motion   3.  Patient will be able to get in and out of the bathtub independently to be able to take a tub bath. NOT MET   4.  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   She is back at work but is having some difficulty with her mobility to due poor knee flexion     Plan:  Angela is discharged from physical therapy effective 8/15/2024 which is the last day she received treatment. She did not come for her last scheduled appointment. Her range of motion did not improve like we had hoped. Per MD note he stated he may have to go in and replace the hardware with a smaller prosthesis but she has declined this at this time.    ANTONIO TRIANA, PT  10/3/2024

## 2024-11-20 ENCOUNTER — HOSPITAL ENCOUNTER (OUTPATIENT)
Dept: RADIOLOGY | Facility: HOSPITAL | Age: 64
Discharge: HOME | End: 2024-11-20
Attending: FAMILY MEDICINE
Payer: COMMERCIAL

## 2024-11-20 VITALS — WEIGHT: 270 LBS | HEIGHT: 67 IN | BODY MASS INDEX: 42.38 KG/M2

## 2024-11-20 DIAGNOSIS — Z12.31 OTHER SCREENING MAMMOGRAM: ICD-10-CM

## 2024-11-20 PROCEDURE — 77063 BREAST TOMOSYNTHESIS BI: CPT | Mod: TC

## 2024-11-26 ENCOUNTER — TELEPHONE (OUTPATIENT)
Dept: ORTHOPEDICS | Facility: CLINIC | Age: 64
End: 2024-11-26
Payer: COMMERCIAL

## 2024-11-26 NOTE — TELEPHONE ENCOUNTER
Tried calling patient to make aware of Dr. Pemberton leaving [facility] at the end of Dec 2024. No answer but was able to leave voicemail message with phone number for Dr. Pemberton's new clinic at [phone].

## 2024-12-24 ENCOUNTER — OFFICE VISIT (OUTPATIENT)
Dept: FAMILY MEDICINE | Facility: CLINIC | Age: 64
End: 2024-12-24
Payer: COMMERCIAL

## 2024-12-24 VITALS
SYSTOLIC BLOOD PRESSURE: 123 MMHG | DIASTOLIC BLOOD PRESSURE: 68 MMHG | OXYGEN SATURATION: 99 % | HEIGHT: 67 IN | HEART RATE: 46 BPM | TEMPERATURE: 98 F | WEIGHT: 287 LBS | BODY MASS INDEX: 45.04 KG/M2

## 2024-12-24 DIAGNOSIS — E03.9 HYPOTHYROIDISM, UNSPECIFIED TYPE: ICD-10-CM

## 2024-12-24 DIAGNOSIS — M10.9 GOUT, UNSPECIFIED CAUSE, UNSPECIFIED CHRONICITY, UNSPECIFIED SITE: ICD-10-CM

## 2024-12-24 DIAGNOSIS — E66.01 CLASS 3 SEVERE OBESITY WITH SERIOUS COMORBIDITY AND BODY MASS INDEX (BMI) OF 40.0 TO 44.9 IN ADULT, UNSPECIFIED OBESITY TYPE: Primary | ICD-10-CM

## 2024-12-24 DIAGNOSIS — G89.29 OTHER CHRONIC PAIN: ICD-10-CM

## 2024-12-24 DIAGNOSIS — E66.813 CLASS 3 SEVERE OBESITY WITH SERIOUS COMORBIDITY AND BODY MASS INDEX (BMI) OF 40.0 TO 44.9 IN ADULT, UNSPECIFIED OBESITY TYPE: Primary | ICD-10-CM

## 2024-12-24 DIAGNOSIS — E55.9 VITAMIN D DEFICIENCY: ICD-10-CM

## 2024-12-24 DIAGNOSIS — I10 ESSENTIAL HYPERTENSION: ICD-10-CM

## 2024-12-24 LAB
ALBUMIN SERPL BCP-MCNC: 3.6 G/DL (ref 3.4–4.8)
ALBUMIN/GLOB SERPL: 1.1 {RATIO}
ALP SERPL-CCNC: 60 U/L (ref 40–150)
ALT SERPL W P-5'-P-CCNC: 12 U/L
ANION GAP SERPL CALCULATED.3IONS-SCNC: 12 MMOL/L (ref 7–16)
AST SERPL W P-5'-P-CCNC: 25 U/L (ref 5–34)
BILIRUB SERPL-MCNC: 0.6 MG/DL
BUN SERPL-MCNC: 16 MG/DL (ref 10–20)
BUN/CREAT SERPL: 17 (ref 6–20)
CALCIUM SERPL-MCNC: 9.4 MG/DL (ref 8.4–10.2)
CHLORIDE SERPL-SCNC: 101 MMOL/L (ref 98–107)
CO2 SERPL-SCNC: 33 MMOL/L (ref 23–31)
CREAT SERPL-MCNC: 0.96 MG/DL (ref 0.55–1.02)
EGFR (NO RACE VARIABLE) (RUSH/TITUS): 67 ML/MIN/1.73M2
EST. AVERAGE GLUCOSE BLD GHB EST-MCNC: 97 MG/DL
GLOBULIN SER-MCNC: 3.3 G/DL (ref 2–4)
GLUCOSE SERPL-MCNC: 89 MG/DL (ref 82–115)
HBA1C MFR BLD HPLC: 5 %
POTASSIUM SERPL-SCNC: 4.4 MMOL/L (ref 3.5–5.1)
PROT SERPL-MCNC: 6.9 G/DL (ref 5.8–7.6)
SODIUM SERPL-SCNC: 142 MMOL/L (ref 136–145)
TSH SERPL DL<=0.005 MIU/L-ACNC: 2.32 UIU/ML (ref 0.35–4.94)

## 2024-12-24 PROCEDURE — 99214 OFFICE O/P EST MOD 30 MIN: CPT | Mod: ,,,

## 2024-12-24 PROCEDURE — 80053 COMPREHEN METABOLIC PANEL: CPT | Mod: ,,, | Performed by: CLINICAL MEDICAL LABORATORY

## 2024-12-24 PROCEDURE — 3078F DIAST BP <80 MM HG: CPT | Mod: CPTII,,,

## 2024-12-24 PROCEDURE — 1159F MED LIST DOCD IN RCRD: CPT | Mod: CPTII,,,

## 2024-12-24 PROCEDURE — 84443 ASSAY THYROID STIM HORMONE: CPT | Mod: ,,, | Performed by: CLINICAL MEDICAL LABORATORY

## 2024-12-24 PROCEDURE — 3074F SYST BP LT 130 MM HG: CPT | Mod: CPTII,,,

## 2024-12-24 PROCEDURE — 3008F BODY MASS INDEX DOCD: CPT | Mod: CPTII,,,

## 2024-12-24 PROCEDURE — 83036 HEMOGLOBIN GLYCOSYLATED A1C: CPT | Mod: ,,, | Performed by: CLINICAL MEDICAL LABORATORY

## 2024-12-24 PROCEDURE — 1160F RVW MEDS BY RX/DR IN RCRD: CPT | Mod: CPTII,,,

## 2024-12-24 RX ORDER — TRAMADOL HYDROCHLORIDE 50 MG/1
50 TABLET ORAL EVERY 8 HOURS PRN
Qty: 20 TABLET | Refills: 0 | Status: SHIPPED | OUTPATIENT
Start: 2024-12-24

## 2024-12-24 RX ORDER — ALLOPURINOL 100 MG/1
100 TABLET ORAL DAILY
Qty: 90 TABLET | Refills: 1 | Status: SHIPPED | OUTPATIENT
Start: 2024-12-24

## 2024-12-24 RX ORDER — METHOCARBAMOL 750 MG/1
TABLET, FILM COATED ORAL
Qty: 30 TABLET | Refills: 4 | Status: SHIPPED | OUTPATIENT
Start: 2024-12-24

## 2024-12-24 RX ORDER — LEVOTHYROXINE SODIUM 88 UG/1
88 TABLET ORAL
Qty: 90 TABLET | Refills: 1 | Status: SHIPPED | OUTPATIENT
Start: 2024-12-24

## 2024-12-24 RX ORDER — ASPIRIN 325 MG
50000 TABLET, DELAYED RELEASE (ENTERIC COATED) ORAL WEEKLY
Qty: 12 CAPSULE | Refills: 1 | Status: SHIPPED | OUTPATIENT
Start: 2024-12-24

## 2024-12-24 RX ORDER — MUPIROCIN 20 MG/G
OINTMENT TOPICAL 3 TIMES DAILY
Qty: 30 G | Refills: 0 | Status: SHIPPED | OUTPATIENT
Start: 2024-12-24

## 2024-12-24 RX ORDER — ATENOLOL AND CHLORTHALIDONE TABLET 50; 25 MG/1; MG/1
1 TABLET ORAL DAILY
Qty: 90 TABLET | Refills: 1 | Status: SHIPPED | OUTPATIENT
Start: 2024-12-24

## 2024-12-24 NOTE — PATIENT INSTRUCTIONS
"Weight Loss Diet   About this topic   There are many "trendy" weight loss diets that are popular today. Many of these diets can end up being more harmful than helpful. The healthiest way to lose weight is to burn more calories than you eat.  A weight loss diet should help you have a healthy view of eating. It is NOT healthy to stop eating to try and lose weight. A good diet plan will help you cut down your food intake and make healthy choices.  A healthy weight loss goal is 1 to 2 pounds (0.5 to 1 kg) per week. Reducing calories in your diet, burning calories through exercise, or both can help you lose weight. Combining a healthy diet with regular physical activity can help you get the best results.  To cut calories in your diet you can:  Switch from whole milk to 1% or skim milk.  Switch from regular cheese to low-fat or fat-free cheese.  Use healthier condiment choices:  Fat-free or low-fat sour cream or salad dressings  Spray butter  Diet syrups or jellies over regular  Try frozen yogurt as a dessert rather than eating ice cream.  Skip the chips. Snack on carrots, vegetables, or fruit. If chips are a favorite of yours, try the baked style and watch portion size.  Eat grilled, roasted, boiled, broiled, or baked meats. Avoid deep-frying. Choose skinless poultry, lean red meat, lean cuts of pork, and fish for good protein sources.  Try flavored no-calorie sears. Do not drink soda and juices that have many calories.  Choose fruit instead of sweets.  General   Eating smaller meals more often may be helpful. This will keep you from overeating at your next meal. Also, eating meals slowly helps you feel full faster.  If eating 3 meals is a part of your lifestyle, choose more lean proteins and higher fiber foods to fill you up at each meal.  Do not skip meals. Most often if you skip a meal, you eat too much at the next meal.  Eat smaller portions. Use a smaller plate or bowl for meals, and when you are eating out, eat " half and take the rest home.  Plan ahead. Plan your meals and grocery list before going to the store. Planning will keep you from getting meals from restaurants.  Do not go to the grocery store hungry. You are more likely to buy snacks that are not good for you.  Portion out snacks. When you are having a snack, instead of grabbing the whole bag, portion a small amount out to give yourself a stopping point.  Drink water before and after your meals to help fill you up without the calories.  When eating starchy foods, choose whole-grain products. These have a lot of fiber which will make you feel full. Fiber also helps lower cholesterol and helps with bowel function.  If you need a helpful start, ask your doctor to send you to a dietitian for weight loss help.         What will the results be?   Losing excess weight will make your whole body healthier. You will have more energy for your daily activities and lower your risk for health problems.  What lifestyle changes are needed?   Stay active. Eating healthy is not always enough to lose weight. Burning calories by exercising is a big part of weight loss.  What foods are good to eat?   The key is to watch your portion sizes. It is best to choose foods that are lower in fat and calories.  Choose lean meats:  Boneless, skinless chicken breast  Pork loin  90% lean beef  Lean turkey meat  Fresh fish (not fried)  Choose low-fat dairy products:  1% or skim milk  Spray butter or margarine  Low-fat or fat-free cheese  Frozen yogurt or low-calorie ice cream  Choose fresh fruits, vegetables, beans and lentils, and whole wheat products more often.  Choose water to drink more often. Drink diet or no-calorie beverages when you want something other than water. Aim to get your calories from the foods you eat.  Choose smart snacks:  Fruits  Vegetables  Low-fat or nonfat yogurt  Low-fat or no-fat cheese, such as cottage cheese  Unsalted nuts  Hard-boiled egg  Hummus  Guacamole  Natural  peanut butter  Popcorn with no butter ? use pepper, garlic, or another spice to taste  Whole grain crackers  What foods should be limited or avoided?   Limit high-fat, high-sodium, and high-calorie foods like:  Fried foods  Processed meats  Whole-fat dairy products  Candy, cookies, chips, pastries  Sausage, dooley, any full-fat meats  Soda, juice  Beer, wine, and mixed drinks (alcohol)  Will there be any other care needed?   What do I do first before trying to lose weight?  Talk to your doctor and dietitian to see if you need to lose weight. Work with them to set your weight loss goals.  If you have a chronic illness, such as high blood sugar or high blood pressure, ask a doctor or dietitian what diet and exercise is right for you.  Ask your doctor about how much you are able to exercise and what type of exercise is good for you.  Helpful tips   Keep a food journal to help keep you on track.  Join a support group.  Tips for burning calories:  If your workplace is near your house, choose to walk or bike to work instead of driving.  Take 20-minute walks each day. Walk around during your lunch break. You will not only burn calories, but will raise your energy for the rest of the day.  Take the stairs over the elevator.  Join a gym or exercise class with a friend.  Try to exercise 30 minutes a day for overall health. Three 10-minute sessions work too. Aim for 60 to 90 minutes a day to lose weight.  Drink lots of water before, during, and after exercise.  Where can I learn more?   Academy of Nutrition and Dietetics  https://www.eatright.org/health/weight-loss/your-health-and-your-weight/back-to-basics-for-healthy-weight-loss   Centers for Disease Control and Prevention  https://www.cdc.gov/healthyweight/healthy_eating/index.html   Familydoctor.org  https://familydoctor.org/nutrition-weight-loss-need-know-fad-diets/    NHS  https://www.nhs.uk/live-well/healthy-weight/12-tips-to-help-you-lose-weight/?tabname=you-and-your-weight   Last Reviewed Date   2021-06-24  Consumer Information Use and Disclaimer   This information is not specific medical advice and does not replace information you receive from your health care provider. This is only a brief summary of general information. It does NOT include all information about conditions, illnesses, injuries, tests, procedures, treatments, therapies, discharge instructions or life-style choices that may apply to you. You must talk with your health care provider for complete information about your health and treatment options. This information should not be used to decide whether or not to accept your health care providers advice, instructions or recommendations. Only your health care provider has the knowledge and training to provide advice that is right for you.  Copyright   Copyright © 2021 [employer] and its affiliates and/or licensors. All rights reserved.        Weight Loss Tips   About this topic   More and more people are concerned about their weight. You can choose from many different programs. The goal of a weight loss program may be to cut down on calories or to lose extra weight through exercise.  Losing weight may mean changing your ideas about food. Going on a diet and losing weight does not mean starving yourself. It means cutting down on the amount of food you eat, making healthy food choices, and being active.  General   Ideally, you need to lose 1 to 2 pounds (0.5 to 1 kg) a week for a healthy weight loss. Losing too much weight too fast is not good. When you take in fewer calories, you will lose weight. Your ideal calorie and weight goal depends on your current age, weight, height, and personal goals. Ask your doctor or dietitian what your ideal weight is.  You need to burn 3500 calories to lose 1 pound (0.5 kg). That means cutting out 500 calories every day for 7  "days. You can cut out 500 calories per day by eating or drinking fewer calories, burning them through exercise, or doing both. To lose that extra weight and stay healthy:  Take time to exercise.  Exercise regularly. Burn calories with activity and exercise. Exercise can help you lose weight and it also strengthens your muscles. Set a schedule where you will have time to do exercises. With just 30 to 60 minutes of exercise each day, you could burn 500 extra calories. Your metabolism stays elevated for a period of time after exercise.  If you don't have time for a 30 minute workout, try three 10 minute exercises each day.  If you work near your home, walk to work. Walking is a very good form of exercise.  Take a 20 minute walk each day. Walk during your lunch break. Park far away, so you have to walk more.  Take the steps instead of elevators. You will burn more calories this way.  If you have an illness, like diabetes or high blood pressure, ask your doctor how much exercise is right for you.  Choose healthy snacks.  Low calorie healthy snacks are a good thing. They help your blood sugar stay even and prevent you from overeating at meals. Choose a balanced snack, such as a small apple with 2 tablespoons (30 grams) of peanut butter.  Keep in mind, even "low calorie" foods can add up. Just because you choose low calorie foods does not mean you do not have to count the calories you eat.  Pack a few fresh fruits or a small salad to take to work or school. Avoid buying a snack at the nearest vending machine.  When you feel thirsty, drink water. Water has no calories and is a very good thirst quencher.  Plan healthy meals.  Plan ahead. Keep a diary of foods that are low in calories. You can also make a list of meal plans for your breakfast, lunch, and dinner. Planning ahead will prevent you from eating out at a fast food place or restaurant.  Make a grocery list before shopping so you only buy food you need. Don't go to " the store hungry.  Visit a dietitian. This person will help you make meal plans that will help you lose weight.  Add fiber to your meals. Adding fiber helps you to feel full for a longer amount of time.  Take care when eating out.  Choose lower fat and lower calorie meals. Try a seafood, lean meat, or vegetarian entrée.  Share a meal with a friend.  Try a salad and appetizer instead of an entree.  Ask for a to-go box when dinner is served and put half of your meal in it for a later meal.  Have fruit for dessert.  Drink water instead of other high calorie drinks.  Learn not to overeat.  Watch your portions. For example, the recommended serving size of meat is 3 ounces (90 grams). This is the size of a deck of playing cards. Two tablespoons (30 grams) of peanut butter is the size of a ping-pong ball. One medium fruit is the size of a baseball.  Use a smaller plate or glass during dinner for less calorie intake.  Try drinking a glass or two of water before eating. This may make you feel more full and help you to eat less.  Eat slowly. Take at least 30 minutes to eat. This gives time for your brain to tell your stomach you are full. This will help you avoid overeating.  Some people eat smaller meals more often to help not to overeat. If you can eat six small meals, make them healthy and low calorie. If three meals are best for you, know your calorie level for the day and spread it out into three healthy low calorie meals.     What will the results be?   Losing weight may make you healthier. You also may have more energy for your daily activities. You may lower disease risk. You may also add years to your life.  What changes to diet are needed?   Learn how to read nutrition labels. Know the serving size. Knowing the calories in an item will help you make healthier choices and lose weight.  Keep a diary of the food you eat. This will help you count the calories you are taking in.  Make a menu in advance. This will help  you make good choices to include in your diet.  Avoid eating 2 hours before bedtime to allow for digestion. If you eat right before you go to bed, you may also have worse heartburn.  Be sure to count the calories in the things you drink. You may want to stop drinking soda pop, beer, wine, and mixed drinks (alcohol). Some coffee drinks also have a lot of calories in them.  Who should use this diet?   A weight loss diet may be needed for people with a calculated body mass index of 25 and over. This means you are overweight or obese.  Who should not use this diet?   People with BMI of 18.5 or lower should not use this diet. Do not use this diet if your doctor does not recommend weight loss.  What foods are good to eat?   Choose foods that are nutritious. Remember, portion control is key. Even a low calorie food can become high in calories if you have too big of a serving. Here is a list of foods that are good to eat:  Vegetables:   Broccoli  Asparagus  Spinach  Green leafy vegetables  Tomatoes  Onions  Mushrooms  Cucumbers  Zucchini  Lean proteins:   Egg whites  Beans including kidney, navy, black, and chickpeas  Grilled, broiled, or baked skinless chicken breast  Grilled, broiled, or baked skinless turkey breast  Lean beef  [place] meat  Grilled, broiled, or baked fish  Beans  Nuts, such as almonds, cashews, and pistachios  Seeds  Whole grains and carbs:   Oatmeal  Brown rice  Sweet potatoes  Cereal  Whole grain bread or pasta  Fruits:   Apples  Grapefruit  Blueberries  Oranges  Bananas  Grapes  Peaches  Pineapple  Strawberries  Dairy:   Fat-free or low-fat milk and cheese  Low fat yogurt  Soy, rice, or almond milk  What foods should be limited or avoided?   Limit or avoid foods that are high in calories like:  Junk foods  Fried foods  Fatty foods  Processed meats  Food with saturated and trans fat  Whole fat dairy products  Butter  Cheese  Ice cream  Food and drinks with a lot of sugar. Some examples are beer, wine,  mixed drinks (alcohol), carbonated sodas, cakes, and cookies.  When do I need to call the doctor?   Weakness  Fast heartbeat  Dizziness  Helpful tips   Join a support group and an exercise group. It is much easier to lose weight if you have support and encouragement.  Do not skip meals. If you skip a meal, you most likely will overeat at that next meal.  Eat at the dining room table instead in front of the TV to help monitor intake.  Where can I learn more?   Academy of Nutrition and Dietetics  https://www.eatright.org/health/weight-loss/your-health-and-your-weight/back-to-basics-for-healthy-weight-loss   Centers for Disease Control and Prevention  https://www.cdc.gov/healthyweight/   Weight-Control Information Network  https://www.niddk.nih.gov/health-information/diet-nutrition/changing-habits-better-health   Last Reviewed Date   2021-08-09  Consumer Information Use and Disclaimer   This information is not specific medical advice and does not replace information you receive from your health care provider. This is only a brief summary of general information. It does NOT include all information about conditions, illnesses, injuries, tests, procedures, treatments, therapies, discharge instructions or life-style choices that may apply to you. You must talk with your health care provider for complete information about your health and treatment options. This information should not be used to decide whether or not to accept your health care providers advice, instructions or recommendations. Only your health care provider has the knowledge and training to provide advice that is right for you.  Copyright   Copyright © 2021 UpToDate, Inc. and its affiliates and/or licensors. All rights reserved.       Controlling Your Blood Pressure Through Lifestyle   The Basics   Written by the doctors and editors at [email or web address]   What does my lifestyle have to do with my blood pressure? -- The things you do and the foods you eat have a big  "effect on your blood pressure and your overall health. Following the right lifestyle can:  Lower your blood pressure or keep you from getting high blood pressure in the first place  Reduce your need for blood pressure medicines  Make medicines for high blood pressure work better, if you do take them  Lower the chances that you'll have a heart attack or stroke, or develop kidney disease  Which lifestyle choices will help lower my blood pressure? -- Here's what you can do:  Lose weight (if you are overweight)  Choose a diet rich in fruits, vegetables, and low-fat dairy products, and low in meats, sweets, and refined grains  Eat less salt (sodium)  Do something active for at least 30 minutes a day on most days of the week  Limit the amount of alcohol you drink  If you have high blood pressure, it's also very important to quit smoking (if you smoke). Quitting smoking might not bring your blood pressure down. But it will lower the chances that you'll have a heart attack or stroke, and it will help you feel better and live longer.  Start low and go slow -- The changes listed above might sound like a lot, but don't worry. You don't have to change everything all at once. The key to improving your lifestyle is to "start low and go slow." Choose 1 small, specific thing to change and try doing it for a while. If it works for you, keep doing it until it becomes a habit. If it doesn't, don't give up. Choose something else to change and see how that goes.  Let's say, for example, that you would like to improve your diet. If you're the type of person who eats cheeseburgers and French fries all the time, you can't switch to eating just salads from one day to the next. When people try to make changes like that, they often fail. Then they feel frustrated and tend to give up. So instead of trying to change everything about your diet in 1 day, change 1 or 2 small things about your diet and give yourself time to get used to those " "changes. For instance, keep the cheeseburger but give up the French fries. Or eat the same things but cut your portions in half.  As you find things that you are able to change and stick with, keep adding new changes. In time, you will see that you can actually change a lot. You just have to get used to the changes slowly.  Lose weight -- When people think about losing weight, they sometimes make it more complicated than it really is. To lose weight, you have to either eat less or move more. If you do both of those things, it's even better. But there is no single weight-loss diet or activity that's better than any other. When it comes to weight loss, the most effective plan is the one that you'll stick with.  Improve your diet -- There is no single diet that is right for everyone. But in general, a healthy diet can include:  Lots of fruits, vegetables, and whole grains  Some beans, peas, lentils, chickpeas, and similar foods  Some nuts, such as walnuts, almonds, and peanuts  Fat-free or low-fat milk and milk products  Some fish  To have a healthy diet, it's also important to limit or avoid sugar, sweets, meats, and refined grains. (Refined grains are found in white bread, white rice, most forms of pasta, and most packaged "snack" foods.)  Reduce salt -- Many people think that eating a low-sodium diet means avoiding the salt shaker and not adding salt when cooking. The truth is, not adding salt at the table or when you cook will only help a little. Almost all of the sodium you eat is already in the food you buy at the grocery store or at restaurants (figure 1).  The most important thing you can do to cut down on sodium is to eat less processed food. That means that you should avoid most foods that are sold in cans, boxes, jars, and bags. You should also eat in restaurants less often.  To reduce the amount of sodium you get, buy fresh or fresh-frozen fruits, vegetables, and meats. (Fresh-frozen foods have had nothing " "added to them before freezing.) Then you can make meals at home, from scratch, with these ingredients.  As with the other changes, don't try to cut out salt all at once. Instead, choose 1 or 2 foods that have a lot of sodium and try to replace them with low-sodium choices. When you get used to those low-sodium options, find another food or 2 to change. Then keep going, until all the foods you eat are sodium-free or low in sodium.  Become more active -- If you want to be more active, you don't have to go to the gym or get all sweaty. It is possible to increase your activity level while doing everyday things you enjoy. Walking, gardening, and dancing are just a few of the things that you might try. As with all the other changes, the key is not to do too much too fast. If you don't do any activity now, start by walking for just a few minutes every other day. Do that for a few weeks. If you stick with it, try doing it for longer. But if you find that you don't like walking, try a different activity.  Drink less alcohol -- If you are a woman, do not have more than 1 "standard drink" of alcohol a day. If you are a man, do not have more than 2. A "standard drink" is:  A can or bottle that has 12 ounces of beer  A glass that has 5 ounces of wine  A shot that has 1.5 ounces of whiskey  Where should I start? -- If you want to improve your lifestyle, start by making the changes that you think would be easiest for you. If you used to exercise and just got out of the habit, maybe it would be easy for you to start exercising again. Or if you actually like cooking meals from scratch, maybe the first thing you should focus on is eating home-cooked meals that are low in sodium.  Whatever you tackle first, choose specific, realistic goals, and give yourself a deadline. For example, do not decide that you are going to "exercise more." Instead, decide that you are going to walk for 10 minutes on Monday, Wednesday, and Friday, and that " you are going to do this for the next 2 weeks.  When lifestyle changes are too general, people have a hard time following through.  Now go. You can do it!  All topics are updated as new evidence becomes available and our peer review process is complete.  This topic retrieved from [employer] on: Sep 21, 2021.  Topic 99053 Version 8.0  Release: 29.4.2 - C29.263  © 2021 UpToDate, Inc. and/or its affiliates. All rights reserved.  figure 1: Sources of sodium in your diet     Graphic 30617 Version 2.0    Consumer Information Use and Disclaimer   This information is not specific medical advice and does not replace information you receive from your health care provider. This is only a brief summary of general information. It does NOT include all information about conditions, illnesses, injuries, tests, procedures, treatments, therapies, discharge instructions or life-style choices that may apply to you. You must talk with your health care provider for complete information about your health and treatment options. This information should not be used to decide whether or not to accept your health care provider's advice, instructions or recommendations. Only your health care provider has the knowledge and training to provide advice that is right for you. The use of this information is governed by the [employer] End User License Agreement, available at https://www.[employer]/en/solutions/[employer].com/about/[place].The use of [employer] content is governed by the [employer] Terms of Use. ©2021 UpToDate, Inc. All rights reserved.  Copyright   © 2021 UpToDate, Inc. and/or its affiliates. All rights reserved.      DASH Diet   About this topic   DASH stands for Dietary Approaches to Stop Hypertension. The DASH diet may help you lower blood pressure. It may also help keep you from getting high blood pressure. You will eat less fat and more fiber on the DASH diet.  This diet gives you more minerals that fight high blood pressure. Some  nutrients in this diet are:  Potassium ? Acts to help you get rid of salt. This may help to lower blood pressure.  Calcium ? Makes blood vessels and muscles work the right way  Magnesium - Helps blood vessels relax  Fiber ? Helps you feel full. It also helps digestion.  What will the results be?   The DASH diet may help you:  Lower your blood pressure and cholesterol  Lower your risk for cancer, heart disease, heart attack, and stroke. It may also lower your risk for heart failure, kidney stones, and diabetes.  Lose weight or keep a healthy weight  What lifestyle changes are needed?   Add regular exercise to get the most help from this diet.  Try to lower stress. Find ways to relax.  Stop smoking. Avoid secondhand smoke.  Limit alcohol intake.  What changes to diet are needed?   Know about poor eating habits. Then, you can fix them as you work with the program.  This diet encourages fruits and vegetables, whole grains, lean meats, healthy fats, and low-fat or fat-free dairy products.  This diet is lower in saturated fats, trans-fats, cholesterol, added sugars, and sodium.  Who should use this diet?   This eating plan is good for the whole family. It is also good for people with high blood pressure and those at risk for high blood pressure.  What foods are good to eat?   Grains: Try to eat 6 to 8 servings of whole grain, high fiber foods each day. These are bread, cereals, brown rice, or pasta.  Fruits and vegetables: Eat 4 to 5 servings each day. Try to pick many kinds and colors. Fresh or frozen are best. Look for low sodium or salt-free if you choose canned.  Dairy: Try to eat 2 to 3 servings of fat free and low fat milk products each day.  Lean meats, poultry, and seafood: Try to eat 6 servings or less of lean meats, poultry, and seafood each day. Try to choose more low fat or lean meats like chicken and turkey. Eat less red meat. Eat more fish instead.  Nuts, seeds, and legumes (dry beans and peas): Try to eat 4  to 5 servings each week. Try to pick nuts such as almonds and walnuts, sunflower seeds, peanut butter, soy beans, lentils, kidney beans, and split peas.  Fats and oils: Try to eat 2 to 3 servings of fats and oils each day. Eat good fats found in fish, nuts, and avocados. Try using olive oil or vegetable oils such as canola oil. Other good oils to try are corn, safflower, sunflower, or soybean oils. Use low-sodium and low-fat salad dressing and mayonnaise.  Condiments: Pepper, herbs, spices, vinegar, lemon or lime juices are great for seasoning. Be careful to choose low-sodium or salt-free products if you use broths, soups, or soy sauce.  Sweets: Try to eat less than 5 servings each week. Choose low-fat and trans fat-free desserts. These are things like fruit flavored gelatin, sorbet, jelly beans, asrah crackers, animal crackers, low-fat fig bars, and olga snaps. Eat fruit to satisfy your desire for sweets.     What foods should be limited or avoided?   Grains: Salted breads, rolls, crackers, quick breads, self-rising flours, biscuit mixes, regular bread crumbs, instant hot cereals, commercially-prepared rice, pasta, stuffing mixes  Fruits and vegetables: Commercially-prepared potatoes and vegetable mixes, regular canned vegetables and juices, vegetables frozen with sauce or pickled vegetables, processed fruits with salt or sodium  Milk: Whole milk, malted milk, chocolate milk, buttermilk, cheese, ice cream  Meats and beans: Smoked, cured, salted, or canned fish; meats or poultry such as dooley, sausages, sardines; high-fat cuts of meat like beef, lamb, or pork; chicken with the skin on it  Fats: Cut back on solid fats like butter, lard, and margarine. Eat less food with high saturated fat, cholesterol and total fat.  Condiments and snacks: Salted and canned peas, beans, and olives; salted snack foods; fried foods; soda or other sweetened drinks  Sweets: High-fat baked goods such as muffins, donuts, pastries,  commercial baked goods, candy bars  If you choose to drink alcohol, limit the amount you drink. Women should have 1 drink or less per day and men should have 2 drinks or less per day.  Helpful tips   Avoid eating canned vegetables and processed foods. These have a lot of salt in them. Look for a low-salt or low-sodium choice.  Try baking or broiling instead of frying food.  Write down the foods you eat. This will help you track what you have eaten each week.  When you go to a grocery store, have a list or a meal plan. Do not shop when you are hungry to avoid cravings for foods.  Read food labels with care. They will show you how much is in a serving. The amount is given as a percentage of the total amount you need each day. Reading labels will help you make healthy food choices.       Avoid fast foods.  Talk to your doctor or dietitian to see if you need vitamin and mineral supplements to help you balance your diet.  Talk to a dietitian for help.  Where can I learn more?   Academy of Nutrition and Dietetics  https://www.eatright.org/health/wellness/heart-and-cardiovascular-health/dash-diet-reducing-hypertension-through-diet-and-lifestyle   FamilyDoctor.org  http://familydoctor.org/familydoctor/en/prevention-wellness/food-nutrition/weight-loss/the-dash-diet-healthy-eating-to-control-your-blood-pressure.html   Last Reviewed Date   2021-03-15  Consumer Information Use and Disclaimer   This information is not specific medical advice and does not replace information you receive from your health care provider. This is only a brief summary of general information. It does NOT include all information about conditions, illnesses, injuries, tests, procedures, treatments, therapies, discharge instructions or life-style choices that may apply to you. You must talk with your health care provider for complete information about your health and treatment options. This information should not be used to decide whether or not to accept  your health care providers advice, instructions or recommendations. Only your health care provider has the knowledge and training to provide advice that is right for you.  Copyright   Copyright © 2021 [employer]. and its affiliates and/or licensors. All rights reserved.    Why Water Is Important to Health   About this topic   Your body needs a certain amount of water to work the right way. Your body takes in water from the fluids you drink and the food you eat. This helps your body get rid of waste products. Water also helps keep your temperature normal, helps with digestion, protects your spinal cord, and lubricates your joints.  If you dont have enough water, doctors say you are dehydrated. You must take in enough water each day to replace what your body loses when you:  Sweat  Pass urine  Have a bowel movement  Breathe  You may lose even more water than normal if you are sick or hurt with something like:  A fever  A burn  An illness where you throw up or have loose stools  Some medicines can also make you lose more water than normal.  General   Most people have heard they need 6 to 8 glasses of water each day. This suggestion is not backed by science. Different people need to drink different amounts of water. How much you need to drink is based on things like your age, body, health, weather, and how active you are. It is even possible to drink too much water in some cases. Being thirsty is your bodys way of telling you it needs fluids.  Good ways to make sure you take in enough fluids include:  Drink healthy fluids when you are thirsty. These are things like water, low-fat milk, plain or flavored seltzer, or unsweetened tea or coffee.  Eat foods that have a higher water content. Fruits and vegetables like strawberries, watermelon, tomatoes, and celery all have high water content.  Drink water before, during, and after a workout. Only drink sports drinks if you plan to exercise at an intense rate for more  than an hour. Sports drinks have carbohydrates and electrolytes that can help with recovery.  Drink water with meals.  There are some kinds of drinks that are not healthy and should be limited or avoided. Try NOT to drink:  Sugary drinks like soda, sports drinks, or sweetened tea or coffee. These can add calories to your diet and lead to tooth decay.  Energy drinks. These can have a lot of caffeine and sugar in them.  Juice drinks. These often have limited amounts of juice and a lot of sugar in them.  Too much alcohol or caffeine. Both of these can cause dehydration.  Tips to increase your fluid intake:  Keep a bottle of water with you. This can encourage you to drink more.  Add fruits or vegetables to plain water to change the taste. Add berries, loren, or cucumbers to your water to flavor it.  When you are hungry, you might actually be thirsty. True hunger will not go away by drinking water. Water can help you manage your weight.  If you have trouble remembering to drink water, try drinking water on a schedule. Set specific times for when you will drink water.  Choose water when eating at restaurants.  Where can I learn more?   American Association of Family Physicians  https://familydoctor.org/hydration-why-its-so-important/   Better Health Channel  https://www.betterhealth.uzma.gov.au/health/HealthyLiving/water-a-vital-nutrient   Centers for Disease Control and Prevention  https://www.cdc.gov/healthywater/drinking/nutrition/index.html   Kids Health  https://kidshealth.org/en/kids/water.html   Last Reviewed Date   2021-09-09  Consumer Information Use and Disclaimer   This information is not specific medical advice and does not replace information you receive from your health care provider. This is only a brief summary of general information. It does NOT include all information about conditions, illnesses, injuries, tests, procedures, treatments, therapies, discharge instructions or life-style choices that may  apply to you. You must talk with your health care provider for complete information about your health and treatment options. This information should not be used to decide whether or not to accept your health care providers advice, instructions or recommendations. Only your health care provider has the knowledge and training to provide advice that is right for you.  Copyright   Copyright © 2021 [employer] and its affiliates and/or licensors. All rights reserved.

## 2024-12-24 NOTE — PROGRESS NOTES
Subjective     Patient ID: Angela Landaverde is a 64 y.o. female.    Chief Complaint: Medication Refill    History of Present Illness    CHIEF COMPLAINT:  Patient presents today for follow-up regarding low heart rate and weight management.    CARDIOVASCULAR:  She reports heart rate consistently in the 40s and has consulted a cardiologist. She denies chest pain, sweating, headache, or shortness of breath. Current Atenolol use may be contributing to bradycardia.    WEIGHT MANAGEMENT:  She reports weight increase from 260s to 287 lbs. She works 12-hour shifts at [employer].    MUSCULOSKELETAL:  She reports toe pain, possibly due to an ingrown toenail. She is currently taking gout medication but does not believe the pain is gout-related.    FAMILY HISTORY:  Sister has diabetes and uses Ozempic for diabetes and weight management.    MEDICATIONS:  She takes levothyroxine for thyroid and gout medication.      ROS:  General: -fever, -chills, -fatigue, +weight gain, -weight loss  Eyes: -vision changes, -redness, -discharge  ENT: -ear pain, -nasal congestion, -sore throat  Cardiovascular: -chest pain, -palpitations, -lower extremity edema  Respiratory: -cough, -shortness of breath  Gastrointestinal: -abdominal pain, -nausea, -vomiting, -diarrhea, -constipation, -blood in stool  Genitourinary: -dysuria, -hematuria, -frequency  Musculoskeletal: -joint pain, -muscle pain  Skin: -rash, -lesion  Neurological: -headache, -dizziness, -numbness, -tingling  Psychiatric: -anxiety, -depression, -sleep difficulty               Objective       Physical Exam    General: No acute distress. Well-developed. Well-nourished.  Eyes: EOMI. Sclerae anicteric.  Cardiovascular: Sinus bradycardia. Regular rhythm. No murmurs. No rubs. No gallops. Normal S1, S2.  Respiratory: Normal respiratory effort. Clear to auscultation bilaterally. No rales. No rhonchi. No wheezing.  Abdomen: Soft. Non-tender. Non-distended. Normoactive bowel sounds.  Musculoskeletal: No   obvious deformity.  Extremities: No lower extremity edema.  Neurological: Alert & oriented x3. No slurred speech. Normal gait.  Psychiatric: Normal mood. Normal affect. Good insight. Good judgment.  Skin: Warm. Dry. No rash. Right great toenail appears to be getting ingrown. Right toe tender. No drainage. Mild erythema.             Assessment and Plan     1. Class 3 severe obesity with serious comorbidity and body mass index (BMI) of 40.0 to 44.9 in adult, unspecified obesity type  -     semaglutide, weight loss, 0.25 mg/0.5 mL PnIj; Inject 0.25 mg into the skin every 7 days.  Dispense: 2 mL; Refill: 0  -     Comprehensive Metabolic Panel; Future; Expected date: 12/24/2024  -     Hemoglobin A1C; Future; Expected date: 12/24/2024  -     TSH; Future; Expected date: 12/24/2024    2. Gout, unspecified cause, unspecified chronicity, unspecified site  -     allopurinoL (ZYLOPRIM) 100 MG tablet; Take 1 tablet (100 mg total) by mouth once daily.  Dispense: 90 tablet; Refill: 1    3. Essential hypertension  -     atenoloL-chlorthalidone (TENORETIC) 50-25 mg Tab; Take 1 tablet by mouth once daily.  Dispense: 90 tablet; Refill: 1  -     semaglutide, weight loss, 0.25 mg/0.5 mL PnIj; Inject 0.25 mg into the skin every 7 days.  Dispense: 2 mL; Refill: 0    4. Vitamin D deficiency  -     cholecalciferol, vitamin D3, 1,250 mcg (50,000 unit) capsule; Take 1 capsule (50,000 Units total) by mouth once a week.  Dispense: 12 capsule; Refill: 1    5. Hypothyroidism, unspecified type  -     levothyroxine (SYNTHROID) 88 MCG tablet; Take 1 tablet (88 mcg total) by mouth before breakfast.  Dispense: 90 tablet; Refill: 1    6. Other chronic pain  -     traMADoL (ULTRAM) 50 mg tablet; Take 1 tablet (50 mg total) by mouth every 8 (eight) hours as needed for Pain.  Dispense: 20 tablet; Refill: 0  -     methocarbamoL (ROBAXIN) 750 MG Tab; TAKE 1 TABLET BY MOUTH 4 TIMES DAILY AS NEEDED  Dispense: 30 tablet; Refill: 4    Other orders  -      mupirocin (BACTROBAN) 2 % ointment; Apply topically 3 (three) times daily.  Dispense: 30 g; Refill: 0      Assessment & Plan    IMPRESSION:  - Assessed patient's low heart rate (40s), likely due to Atenolol (beta-blocker)  - Reviewed previous cardiology EKG showing sinus bradycardia without heart block  - Considered weight reduction strategy to potentially reduce reliance on blood pressure medication  - Evaluated toe pain, suspected ingrown toenail  - Planned diabetes screening    BRADYCARDIA:  - Monitored patient's heart rate, which has been in the 40s, as observed by Dr. Sanders and during a visit to [facility].  - Noted that heart rate increased after walking around the hallway during a previous medical visit.  - Performed EKG, which showed sinus bradycardia without heart block.  - Recorded current heart rate at 44.  - Assessed that the low heart rate is likely due to Atenolol, a beta-blocker medication.  - Instructed the patient to contact the office if experiencing symptoms related to low heart rate (40s).  - Provided low heart rate information to the patient.  - Plan to monitor heart rate and symptoms, with instructions to report if symptoms occur with low heart rate.    HYPERTENSION:  - Evaluated blood pressure, which is currently well-controlled.  - Considered reducing blood pressure medication if weight loss is achieved.  - Recommend drinking at least 3.8 L of water a day and adhering to a low sodium diet to help manage blood pressure.    WEIGHT MANAGEMENT:  - Monitored patient's weight, noting fluctuations from previously reaching 118 kg to now close to 131 kg.  - Discussed weight loss strategies and the impact of weight on blood pressure medication needs.  - Initiated semaglutide (Wegovy) injection once weekly for weight loss.  - Explained potential side effects of semaglutide: nausea, headache, constipation.  - Provided dietary counseling, recommending avoidance of fried, greasy foods and increased consumption  of leafy greens.  - Encouraged regular physical activity to maintain joint mobility and aid in weight loss.  - Provided weight loss information to the patient.  - Scheduled follow up in 4 weeks to assess response to new medication (semaglutide).    INGROWN TOENAIL:  - Monitored patient's report of toe pain, particularly on one side.  - Performed physical exam revealing signs of an ingrown toenail.  - Assessed the condition as an ingrown toenail and provided advice on proper nail trimming.  - Prescribed topical antibiotic ointment, to be applied in a thin layer to affected toe 3 times daily.  - Recommend soaking affected toe in warm water with Epsom salt and vinegar a few times daily.  - Advised against trimming the nail at present to avoid exacerbating the condition.    GOUT:  - Noted that the patient is currently taking medication for gout.  - Recommend maintaining regular physical activity to keep joints mobile.    THYROID DISORDER:  - Inquired about the specific type of thyroid issue, ruling out medullary thyroid cancer.  - Noted that the patient is currently taking thyroid medication.    DIABETES SCREENING:  - Documented that sister has diabetes and is taking Ozempic.  - Monitored patient's concern about diabetes and reports of toe discomfort.  - Ordered diabetes screening test.    DIETARY RECOMMENDATIONS:  - Provided dietary advice including drinking 3.8 L of water daily, adhering to a low sodium diet, avoiding fried greasy foods, and increasing consumption of leafy greens.  - Counseled on gradually reducing soda consumption and increasing water intake.  - Educated on preference for zero sugar sodas over diet versions if choosing between the two.  - Discussed benefits of proper hydration for blood pressure management.  - Discussed dietary recommendations: low salt intake, avoiding fried/greasy foods, increasing leafy greens.  - Recommend drinking at least a gallon of water daily.  - Patient to reduce intake of  sugary and diet sodas, gradually increasing water consumption.  - Patient to eat regular, healthy meals to manage potential medication side effects.  - Recommend increasing intake of leafy greens.    FOLLOW UP:  - Referred patient to [facility] for [facility] care.             Follow up in about 4 weeks (around 1/21/2025).    This note was generated with the assistance of ambient listening technology. Verbal consent was obtained by the patient and accompanying visitor(s) for the recording of patient appointment to facilitate this note. I attest to having reviewed and edited the generated note for accuracy, though some syntax or spelling errors may persist. Please contact the author of this note for any clarification.         I spent a total of 50 minutes on the day of the visit.This includes face to face time and non-face to face time preparing to see the patient (eg, review of tests), obtaining and/or reviewing separately obtained history, documenting clinical information in the electronic or other health record, independently interpreting results and communicating results to the patient/family/caregiver, or care coordinator.    PAULO Petty

## 2025-01-14 ENCOUNTER — TELEPHONE (OUTPATIENT)
Dept: FAMILY MEDICINE | Facility: CLINIC | Age: 65
End: 2025-01-14
Payer: COMMERCIAL

## 2025-01-14 NOTE — TELEPHONE ENCOUNTER
----- Message from Ching sent at 1/2/2025 11:14 AM CST -----  Regarding: nursecallback  Patient called to get nurse to call pharm about her traMADoL (ULTRAM) 50 mg tablet. The pharm is rejecting due to provider id not available. Also would like to see if an alternative for semaglutide, weight loss, 0.25 mg/0.5 mL PnIj due to insurance does not pay for weight loss meds

## 2025-01-16 ENCOUNTER — TELEPHONE (OUTPATIENT)
Dept: ORTHOPEDICS | Facility: CLINIC | Age: 65
End: 2025-01-16
Payer: COMMERCIAL

## 2025-01-16 ENCOUNTER — OFFICE VISIT (OUTPATIENT)
Dept: FAMILY MEDICINE | Facility: CLINIC | Age: 65
End: 2025-01-16
Payer: COMMERCIAL

## 2025-01-16 VITALS
RESPIRATION RATE: 20 BRPM | DIASTOLIC BLOOD PRESSURE: 70 MMHG | WEIGHT: 280 LBS | OXYGEN SATURATION: 99 % | SYSTOLIC BLOOD PRESSURE: 120 MMHG | BODY MASS INDEX: 43.95 KG/M2 | HEIGHT: 67 IN | TEMPERATURE: 97 F | HEART RATE: 54 BPM

## 2025-01-16 DIAGNOSIS — Z12.11 SCREENING FOR COLON CANCER: ICD-10-CM

## 2025-01-16 DIAGNOSIS — E55.9 VITAMIN D DEFICIENCY: ICD-10-CM

## 2025-01-16 DIAGNOSIS — M19.90 ARTHRITIS: ICD-10-CM

## 2025-01-16 DIAGNOSIS — E66.01 SEVERE OBESITY (BMI >= 40): Primary | ICD-10-CM

## 2025-01-16 DIAGNOSIS — I10 ESSENTIAL HYPERTENSION: ICD-10-CM

## 2025-01-16 DIAGNOSIS — E78.5 HYPERLIPIDEMIA, UNSPECIFIED HYPERLIPIDEMIA TYPE: ICD-10-CM

## 2025-01-16 LAB
25(OH)D3 SERPL-MCNC: 48.6 NG/ML (ref 30–80)
ALBUMIN SERPL BCP-MCNC: 3.7 G/DL (ref 3.4–4.8)
ALBUMIN/GLOB SERPL: 1.1 {RATIO}
ALP SERPL-CCNC: 60 U/L (ref 40–150)
ALT SERPL W P-5'-P-CCNC: 13 U/L
ANION GAP SERPL CALCULATED.3IONS-SCNC: 11 MMOL/L (ref 7–16)
AST SERPL W P-5'-P-CCNC: 26 U/L (ref 5–34)
BILIRUB SERPL-MCNC: 0.7 MG/DL
BUN SERPL-MCNC: 14 MG/DL (ref 10–20)
BUN/CREAT SERPL: 17 (ref 6–20)
CALCIUM SERPL-MCNC: 9.4 MG/DL (ref 8.4–10.2)
CHLORIDE SERPL-SCNC: 103 MMOL/L (ref 98–107)
CHOLEST SERPL-MCNC: 195 MG/DL
CHOLEST/HDLC SERPL: 2.6 {RATIO}
CO2 SERPL-SCNC: 32 MMOL/L (ref 23–31)
CREAT SERPL-MCNC: 0.81 MG/DL (ref 0.55–1.02)
EGFR (NO RACE VARIABLE) (RUSH/TITUS): 82 ML/MIN/1.73M2
GLOBULIN SER-MCNC: 3.3 G/DL (ref 2–4)
GLUCOSE SERPL-MCNC: 99 MG/DL (ref 82–115)
HDLC SERPL-MCNC: 74 MG/DL (ref 35–60)
LDLC SERPL CALC-MCNC: 111 MG/DL
LDLC/HDLC SERPL: 1.5 {RATIO}
NONHDLC SERPL-MCNC: 121 MG/DL
POTASSIUM SERPL-SCNC: 4.3 MMOL/L (ref 3.5–5.1)
PROT SERPL-MCNC: 7 G/DL (ref 5.8–7.6)
SODIUM SERPL-SCNC: 142 MMOL/L (ref 136–145)
TRIGL SERPL-MCNC: 50 MG/DL (ref 37–140)
VLDLC SERPL-MCNC: 10 MG/DL

## 2025-01-16 PROCEDURE — 80061 LIPID PANEL: CPT | Mod: ,,, | Performed by: CLINICAL MEDICAL LABORATORY

## 2025-01-16 PROCEDURE — 3008F BODY MASS INDEX DOCD: CPT | Mod: CPTII,,,

## 2025-01-16 PROCEDURE — 3074F SYST BP LT 130 MM HG: CPT | Mod: CPTII,,,

## 2025-01-16 PROCEDURE — 1160F RVW MEDS BY RX/DR IN RCRD: CPT | Mod: CPTII,,,

## 2025-01-16 PROCEDURE — 99214 OFFICE O/P EST MOD 30 MIN: CPT | Mod: ,,,

## 2025-01-16 PROCEDURE — 36415 COLL VENOUS BLD VENIPUNCTURE: CPT | Mod: ,,,

## 2025-01-16 PROCEDURE — 1159F MED LIST DOCD IN RCRD: CPT | Mod: CPTII,,,

## 2025-01-16 PROCEDURE — 82306 VITAMIN D 25 HYDROXY: CPT | Mod: ,,, | Performed by: CLINICAL MEDICAL LABORATORY

## 2025-01-16 PROCEDURE — 3078F DIAST BP <80 MM HG: CPT | Mod: CPTII,,,

## 2025-01-16 PROCEDURE — 80053 COMPREHEN METABOLIC PANEL: CPT | Mod: ,,, | Performed by: CLINICAL MEDICAL LABORATORY

## 2025-01-16 RX ORDER — DICLOFENAC SODIUM 50 MG/1
50 TABLET, DELAYED RELEASE ORAL 2 TIMES DAILY PRN
Qty: 60 TABLET | Refills: 2 | Status: SHIPPED | OUTPATIENT
Start: 2025-01-16

## 2025-01-16 NOTE — PROGRESS NOTES
Subjective     Patient ID: Angela Landaverde is a 64 y.o. female.    Chief Complaint: Establish Care and weight loss meds (Insurance wouldn't cover wegovy)    HPI  Review of Systems  History of Present Illness    CHIEF COMPLAINT:  Patient presents today to establish care and weight management.    WEIGHT MANAGEMENT:  She reports previous successful weight loss with Ozempic prior to knee surgery. She attempted to obtain Wegovy but encountered insurance coverage denial. Her last A1C in December was 5.0.    MUSCULOSKELETAL PAIN:  She experiences arthritis pain that worsens with sitting and initial standing, though improves with continued movement. She can walk for approximately 30 minutes before developing significant leg pain. She has used topical creams and tried tramadol and celebrex and all have been ineffective. She has a history of right knee surgery and reports current left knee pain that worsens with prolonged standing or walking. She plans to inquire about receiving an injection for left knee pain management.    CARDIOVASCULAR:  She has a history of high cholesterol and low heart rate previously evaluated by cardiology. She currently takes a combination beta blocker and diuretic for blood pressure management. She denies shortness of breath or dizziness associated with the low heart rate.        ROS:  General: -fever, -chills, +fatigue, -weight gain, -weight loss  Eyes: -vision changes, -redness, -discharge  ENT: -ear pain, -nasal congestion, -sore throat  Cardiovascular: -chest pain, -palpitations, -lower extremity edema  Respiratory: -cough, -shortness of breath  Gastrointestinal: -abdominal pain, -nausea, -vomiting, -diarrhea, -constipation, -blood in stool  Genitourinary: -dysuria, -hematuria, -frequency  Musculoskeletal: +joint pain, -muscle pain  Skin: -rash, -lesion  Neurological: -headache, -dizziness, -numbness, -tingling  Psychiatric: -anxiety, -depression, -sleep difficulty             Objective      Physical Exam  Vitals and nursing note reviewed.   Constitutional:       Appearance: Normal appearance. She is obese.   HENT:      Head: Normocephalic and atraumatic.      Nose: Nose normal.      Mouth/Throat:      Mouth: Mucous membranes are moist.   Eyes:      Extraocular Movements: Extraocular movements intact.      Conjunctiva/sclera: Conjunctivae normal.      Pupils: Pupils are equal, round, and reactive to light.   Cardiovascular:      Rate and Rhythm: Normal rate and regular rhythm.      Pulses: Normal pulses.      Heart sounds: Normal heart sounds.   Pulmonary:      Effort: Pulmonary effort is normal.      Breath sounds: Normal breath sounds.   Abdominal:      General: Abdomen is flat. Bowel sounds are normal.      Palpations: Abdomen is soft.   Musculoskeletal:         General: Normal range of motion.      Cervical back: Normal range of motion and neck supple.   Skin:     General: Skin is warm and dry.      Capillary Refill: Capillary refill takes less than 2 seconds.   Neurological:      General: No focal deficit present.      Mental Status: She is alert and oriented to person, place, and time.   Psychiatric:         Behavior: Behavior normal.         Thought Content: Thought content normal.          Assessment and Plan     1. Severe obesity (BMI >= 40)  -     naltrexone-bupropion (CONTRAVE) 8-90 mg TbSR; Take one tablet by mouth once daily for 7 days. THEN, take one tablet by mouth twice daily for 7 days. THEN, take 2 tablets by mouth in AM and 1 tablet by mouth in PM for 7 days. THEN, take 2 tablets by mouth in the AM and 2 tablets by mouth in the PM  Dispense: 70 tablet; Refill: 0    2. Screening for colon cancer  -     Cologuard Screening (Multitarget Stool DNA); Future; Expected date: 01/16/2025    3. Vitamin D deficiency  -     Vitamin D    4. Essential hypertension  -     Comprehensive Metabolic Panel  -     Cancel: Basic Metabolic Panel    5. Hyperlipidemia, unspecified hyperlipidemia type  -      Lipid Panel    6. Arthritis  -     diclofenac (VOLTAREN) 50 MG EC tablet; Take 1 tablet (50 mg total) by mouth 2 (two) times daily as needed (pain).  Dispense: 60 tablet; Refill: 2      Assessment & Plan    IMPRESSION:    -Trial of contrave for weight management  -Diet discussed with patient. Cut out intake of chips, breads, pastas, and sweets.  -Add more fruits and vegetables and protein to diet  -Increase water intake to at least 64 ounces per day  -Trial of diclofenac PRN pain  -F/U with ortho for possible knee injections       Follow up in about 4 weeks (around 2/13/2025).    This note was generated with the assistance of ambient listening technology. Verbal consent was obtained by the patient and accompanying visitor(s) for the recording of patient appointment to facilitate this note. I attest to having reviewed and edited the generated note for accuracy, though some syntax or spelling errors may persist. Please contact the author of this note for any clarification.

## 2025-01-16 NOTE — TELEPHONE ENCOUNTER
----- Message from Sonia sent at 1/16/2025 12:07 PM CST -----  Regarding: calling back  Who Called: Angela Landaverde    Patient is returning phone call    Who Left Message for Patient:Padmini   Does the patient know what this is regarding?:appointment       Preferred Method of Contact: Phone Call  Patient's Preferred Phone Number on File: [phone]   Best Call Back Number, if different:  Additional Information:

## 2025-01-16 NOTE — TELEPHONE ENCOUNTER
----- Message from Hien sent at 1/16/2025 10:47 AM CST -----  Regarding: Same Day Appointment  Who Called: [facility]    Caller is requesting a same day appointment. Caller declined first available appointment listed below.      When is the first available appointment?1/27  Symptoms or reason for appointment: Patient is wanting to be seen to get an injection in her L knee. She has seen tonya and Dr. Pemberton in the past      Preferred Method of Contact: Phone Call  Patient's Preferred Phone Number on File: [phone]

## 2025-01-23 ENCOUNTER — OFFICE VISIT (OUTPATIENT)
Dept: ORTHOPEDICS | Facility: CLINIC | Age: 65
End: 2025-01-23
Payer: COMMERCIAL

## 2025-01-23 ENCOUNTER — TELEPHONE (OUTPATIENT)
Dept: ORTHOPEDICS | Facility: CLINIC | Age: 65
End: 2025-01-23
Payer: COMMERCIAL

## 2025-01-23 VITALS
DIASTOLIC BLOOD PRESSURE: 61 MMHG | SYSTOLIC BLOOD PRESSURE: 156 MMHG | HEIGHT: 67 IN | HEART RATE: 60 BPM | BODY MASS INDEX: 45.56 KG/M2 | OXYGEN SATURATION: 98 % | WEIGHT: 290.31 LBS

## 2025-01-23 DIAGNOSIS — M17.12 PRIMARY OSTEOARTHRITIS OF LEFT KNEE: Primary | ICD-10-CM

## 2025-01-23 PROCEDURE — 3008F BODY MASS INDEX DOCD: CPT | Mod: CPTII,,, | Performed by: NURSE PRACTITIONER

## 2025-01-23 PROCEDURE — 99213 OFFICE O/P EST LOW 20 MIN: CPT | Mod: S$PBB,25,, | Performed by: NURSE PRACTITIONER

## 2025-01-23 PROCEDURE — 1159F MED LIST DOCD IN RCRD: CPT | Mod: CPTII,,, | Performed by: NURSE PRACTITIONER

## 2025-01-23 PROCEDURE — 99999PBSHW PR PBB SHADOW TECHNICAL ONLY FILED TO HB: Mod: PBBFAC,,,

## 2025-01-23 PROCEDURE — 3077F SYST BP >= 140 MM HG: CPT | Mod: CPTII,,, | Performed by: NURSE PRACTITIONER

## 2025-01-23 PROCEDURE — 20610 DRAIN/INJ JOINT/BURSA W/O US: CPT | Mod: PBBFAC,LT | Performed by: NURSE PRACTITIONER

## 2025-01-23 PROCEDURE — 99213 OFFICE O/P EST LOW 20 MIN: CPT | Mod: PBBFAC,25 | Performed by: NURSE PRACTITIONER

## 2025-01-23 PROCEDURE — 3078F DIAST BP <80 MM HG: CPT | Mod: CPTII,,, | Performed by: NURSE PRACTITIONER

## 2025-01-23 PROCEDURE — 99999 PR PBB SHADOW E&M-EST. PATIENT-LVL III: CPT | Mod: PBBFAC,,, | Performed by: NURSE PRACTITIONER

## 2025-01-23 RX ORDER — MELOXICAM 15 MG/1
15 TABLET ORAL DAILY
Qty: 30 TABLET | Refills: 2 | Status: SHIPPED | OUTPATIENT
Start: 2025-01-23

## 2025-01-23 RX ORDER — TRIAMCINOLONE ACETONIDE 40 MG/ML
40 INJECTION, SUSPENSION INTRA-ARTICULAR; INTRAMUSCULAR
Status: DISCONTINUED | OUTPATIENT
Start: 2025-01-23 | End: 2025-01-23 | Stop reason: HOSPADM

## 2025-01-23 RX ADMIN — TRIAMCINOLONE ACETONIDE 40 MG: 40 INJECTION, SUSPENSION INTRA-ARTICULAR; INTRAMUSCULAR at 02:01

## 2025-01-23 NOTE — PROGRESS NOTES
"  CC:  Knee pain    64 y.o. Female returns to clinic for a follow up visit regarding left knee pain.       Patient would like a repeat injection today as she had good relief from her last injection.   She would like visco at her next visit.  Her last steroid injection was 2024.  Reports he has done well.  Reports she takes Mobic 7.5 mg p.o. daily as needed.  Reports she can not tell it is helping any longer.  She had a previous right total knee arthroplasty by Dr. Pemberton   Past Medical History:   Diagnosis Date    Arthritis     Hypertension     Thyroid disease      Past Surgical History:   Procedure Laterality Date     SECTION      KNEE JOINT MANIPULATION Right 2024    Procedure: MANIPULATION, KNEE;  Surgeon: Mark Pemberton MD;  Location: [facility] OR;  Service: Orthopedics;  Laterality: Right;    ROBOTIC ARTHROPLASTY, KNEE Right 3/11/2024    Procedure: ROBOTIC ARTHROPLASTY, KNEE, TOTAL;  Surgeon: Mark Pemberton MD;  Location: [facility] OR;  Service: Orthopedics;  Laterality: Right;    TUBAL LIGATION           PHYSICAL EXAMINATION:  BP (!) 156/61   Pulse 60   Ht 5' 7" (1.702 m)   Wt 131.7 kg (290 lb 4.8 oz)   SpO2 98%   BMI 45.47 kg/m²   General    Constitutional: She is oriented to person, place, and time. She appears well-nourished.   HENT:   Head: Normocephalic and atraumatic.   Eyes: Pupils are equal, round, and reactive to light.   Neck: Neck supple.   Cardiovascular:  Normal rate and regular rhythm.            Pulmonary/Chest: Effort normal. No respiratory distress.   Abdominal: There is no abdominal tenderness. There is no guarding.   Neurological: She is alert and oriented to person, place, and time. She has normal reflexes.   Psychiatric: She has a normal mood and affect. Her behavior is normal. Judgment and thought content normal.           Right Knee Exam     Tests   Patella   Patellar Grind: positive    Left Knee Exam     Inspection   Swelling: present  Effusion: " present    Tenderness   The patient tender to palpation of the medial joint line and lateral joint line.    Crepitus   The patient has crepitus of the patella.    Range of Motion   Extension:  normal   Flexion:  abnormal     Tests   Meniscus   Dilan:  Medial - positive   Stability   Lachman: normal (-1 to 2mm)   PCL-Posterior Drawer: normal (0 to 2mm)  Patella   Patellar Tracking: normal  Patellar Grind: positive    Other   Sensation: normal    Muscle Strength   Right Lower Extremity   Quadriceps:  5/5   Hamstrin/5   Left Lower Extremity   Quadriceps:  5/5   Hamstrin/5       IMAGING:  No results found.     ASSESSMENT:      ICD-10-CM ICD-9-CM   1. Primary osteoarthritis of left knee  M17.12 715.16       PLAN:     -Findings and treatment options were discussed with the patient  -All questions answered  Natural history and expected course discussed. Questions answered.  Educational materials distributed.  Reduction in offending activity.  NSAIDs per medication orders.  Arthrocentesis. See procedure note.  Left knee injection today.  Mobic 15 mg p.o. daily.  We will order viscosupplementation for next visit.  Return to clinic 4 months for recheck    There are no Patient Instructions on file for this visit.      No orders of the defined types were placed in this encounter.        Large Joint Aspiration/Injection: L supra patellar bursa    Date/Time: 2025 2:40 PM    Performed by: Ernestina Ramirez FNP  Authorized by: Ernestina Ramirez FNP    Consent Done?:  Yes (Verbal)  Indications:  Pain  Site marked: the procedure site was marked    Local anesthetic:  Bupivacaine 0.25% without epinephrine    Details:  Needle Size:  22 G  Location:  Knee  Site:  L supra patellar bursa  Medications:  40 mg triamcinolone acetonide 40 mg/mL  Patient tolerance:  Patient tolerated the procedure well with no immediate complications       Detail Level: Simple Comment: [id number] Render Risk Assessment In Note?: no

## 2025-01-23 NOTE — TELEPHONE ENCOUNTER
----- Message from Hien sent at 1/23/2025 11:02 AM CST -----  Regarding: Change Appt Time  Who Called: Angela Akhil    She has to  a child off of the bus at 3:15 and wanted to know if she could come later or earlier than her appointment time     Preferred Method of Contact: Phone Call  Patient's Preferred Phone Number on File: [phone]

## 2025-02-27 ENCOUNTER — OFFICE VISIT (OUTPATIENT)
Dept: FAMILY MEDICINE | Facility: CLINIC | Age: 65
End: 2025-02-27
Payer: COMMERCIAL

## 2025-02-27 VITALS
WEIGHT: 280 LBS | BODY MASS INDEX: 43.95 KG/M2 | HEART RATE: 60 BPM | HEIGHT: 67 IN | OXYGEN SATURATION: 99 % | SYSTOLIC BLOOD PRESSURE: 126 MMHG | RESPIRATION RATE: 20 BRPM | DIASTOLIC BLOOD PRESSURE: 78 MMHG

## 2025-02-27 DIAGNOSIS — E66.01 CLASS 3 SEVERE OBESITY WITH BODY MASS INDEX (BMI) OF 40.0 TO 44.9 IN ADULT, UNSPECIFIED OBESITY TYPE, UNSPECIFIED WHETHER SERIOUS COMORBIDITY PRESENT: Primary | ICD-10-CM

## 2025-02-27 DIAGNOSIS — E66.813 CLASS 3 SEVERE OBESITY WITH BODY MASS INDEX (BMI) OF 40.0 TO 44.9 IN ADULT, UNSPECIFIED OBESITY TYPE, UNSPECIFIED WHETHER SERIOUS COMORBIDITY PRESENT: Primary | ICD-10-CM

## 2025-02-27 DIAGNOSIS — E78.5 HYPERLIPIDEMIA, UNSPECIFIED HYPERLIPIDEMIA TYPE: ICD-10-CM

## 2025-02-27 DIAGNOSIS — I10 ESSENTIAL HYPERTENSION: ICD-10-CM

## 2025-02-27 PROCEDURE — 99213 OFFICE O/P EST LOW 20 MIN: CPT | Mod: ,,,

## 2025-02-27 PROCEDURE — 3078F DIAST BP <80 MM HG: CPT | Mod: CPTII,,,

## 2025-02-27 PROCEDURE — 1159F MED LIST DOCD IN RCRD: CPT | Mod: CPTII,,,

## 2025-02-27 PROCEDURE — 3074F SYST BP LT 130 MM HG: CPT | Mod: CPTII,,,

## 2025-02-27 PROCEDURE — 3008F BODY MASS INDEX DOCD: CPT | Mod: CPTII,,,

## 2025-02-27 PROCEDURE — 1160F RVW MEDS BY RX/DR IN RCRD: CPT | Mod: CPTII,,,

## 2025-02-27 NOTE — PROGRESS NOTES
Subjective     Patient ID: Angela Landaverde is a 64 y.o. female.    Chief Complaint: Medication Refill (Med refills )    Medication Refill  Review of Systems  History of Present Illness    CHIEF COMPLAINT:  Patient presents today for follow up of weight management    WEIGHT MANAGEMENT:  She has lost approximately 5 lbs since last visit without actively trying through diet or exercise. She is not weighing herself at home. She continues Contrave as prescribed, taking two tablets in the morning and two tablets at night. Denies any adverse effects to medication.    LIFESTYLE:  She reports reducing soda intake and walking for exercise. Her outdoor activities have increased with improved weather, though she notes limitations in exercise capacity due to knee pain.    PREVENTIVE CARE:  She has completed cologuard with good results. She acknowledges need to schedule previously postponed Pap smear.      ROS:  General: -fever, -chills, -fatigue, -weight gain, -weight loss  Eyes: -vision changes, -redness, -discharge  ENT: -ear pain, -nasal congestion, -sore throat  Cardiovascular: -chest pain, -palpitations, -lower extremity edema  Respiratory: -cough, -shortness of breath  Gastrointestinal: -abdominal pain, -nausea, -vomiting, -diarrhea, -constipation, -blood in stool  Genitourinary: -dysuria, -hematuria, -frequency  Musculoskeletal: -joint pain, -muscle pain  Skin: -rash, -lesion  Neurological: -headache, -dizziness, -numbness, -tingling  Psychiatric: -anxiety, -depression, -sleep difficulty             Objective     Physical Exam  Vitals and nursing note reviewed.   Constitutional:       Appearance: Normal appearance. She is obese.   HENT:      Head: Normocephalic.      Nose: Nose normal.      Mouth/Throat:      Mouth: Mucous membranes are moist.   Eyes:      Extraocular Movements: Extraocular movements intact.      Conjunctiva/sclera: Conjunctivae normal.      Pupils: Pupils are equal, round, and reactive to light.    Cardiovascular:      Rate and Rhythm: Normal rate and regular rhythm.      Pulses: Normal pulses.      Heart sounds: Normal heart sounds.   Pulmonary:      Effort: Pulmonary effort is normal.      Breath sounds: Normal breath sounds.   Abdominal:      General: Abdomen is flat. Bowel sounds are normal.      Palpations: Abdomen is soft.   Musculoskeletal:         General: Normal range of motion.      Cervical back: Normal range of motion and neck supple.   Skin:     General: Skin is warm and dry.      Capillary Refill: Capillary refill takes less than 2 seconds.   Neurological:      General: No focal deficit present.      Mental Status: She is alert and oriented to person, place, and time.   Psychiatric:         Behavior: Behavior normal.         Thought Content: Thought content normal.        Assessment and Plan     1. Class 3 severe obesity with body mass index (BMI) of 40.0 to 44.9 in adult, unspecified obesity type, unspecified whether serious comorbidity present  -     naltrexone-bupropion (CONTRAVE) 8-90 mg TbSR; Take 2 tablets by mouth 2 (two) times daily.  Dispense: 112 tablet; Refill: 2    2. Essential hypertension    3. Hyperlipidemia, unspecified hyperlipidemia type    4. Severe obesity (BMI >= 40)      Assessment & Plan    IMPRESSION:  - Continue contrave as discussed  - Recommend high protein, low carbohydrate, low fat diet  - Increase PO water intake  - Recommend getting 20 minutes of exercise at least 3 days per week         Follow up in about 3 months (around 5/27/2025).    This note was generated with the assistance of ambient listening technology. Verbal consent was obtained by the patient and accompanying visitor(s) for the recording of patient appointment to facilitate this note. I attest to having reviewed and edited the generated note for accuracy, though some syntax or spelling errors may persist. Please contact the author of this note for any clarification.

## 2025-04-23 ENCOUNTER — OFFICE VISIT (OUTPATIENT)
Dept: ORTHOPEDICS | Facility: CLINIC | Age: 65
End: 2025-04-23
Payer: COMMERCIAL

## 2025-04-23 ENCOUNTER — CLINICAL SUPPORT (OUTPATIENT)
Dept: FAMILY MEDICINE | Facility: CLINIC | Age: 65
End: 2025-04-23
Payer: COMMERCIAL

## 2025-04-23 ENCOUNTER — HOSPITAL ENCOUNTER (OUTPATIENT)
Dept: RADIOLOGY | Facility: HOSPITAL | Age: 65
Discharge: HOME | End: 2025-04-23
Attending: NURSE PRACTITIONER
Payer: COMMERCIAL

## 2025-04-23 VITALS — DIASTOLIC BLOOD PRESSURE: 80 MMHG | SYSTOLIC BLOOD PRESSURE: 160 MMHG

## 2025-04-23 DIAGNOSIS — M17.12 PRIMARY OSTEOARTHRITIS OF LEFT KNEE: Primary | ICD-10-CM

## 2025-04-23 DIAGNOSIS — M17.12 PRIMARY OSTEOARTHRITIS OF LEFT KNEE: ICD-10-CM

## 2025-04-23 DIAGNOSIS — I10 ESSENTIAL HYPERTENSION: Primary | ICD-10-CM

## 2025-04-23 PROCEDURE — 73564 X-RAY EXAM KNEE 4 OR MORE: CPT | Mod: TC,LT

## 2025-04-23 PROCEDURE — 99213 OFFICE O/P EST LOW 20 MIN: CPT | Mod: PBBFAC,25 | Performed by: NURSE PRACTITIONER

## 2025-04-23 PROCEDURE — 99999PBSHW PR PBB SHADOW TECHNICAL ONLY FILED TO HB: Mod: JZ,PBBFAC,,

## 2025-04-23 PROCEDURE — 20610 DRAIN/INJ JOINT/BURSA W/O US: CPT | Mod: PBBFAC,LT | Performed by: NURSE PRACTITIONER

## 2025-04-23 PROCEDURE — 99999 PR PBB SHADOW E&M-EST. PATIENT-LVL III: CPT | Mod: PBBFAC,,, | Performed by: NURSE PRACTITIONER

## 2025-04-23 RX ORDER — VALSARTAN 40 MG/1
40 TABLET ORAL DAILY
COMMUNITY
Start: 2025-04-04 | End: 2026-04-04

## 2025-04-23 RX ORDER — GABAPENTIN 300 MG/1
300 CAPSULE ORAL DAILY PRN
COMMUNITY
Start: 2024-10-03

## 2025-04-23 RX ADMIN — Medication 48 MG: at 10:04

## 2025-04-23 NOTE — PROGRESS NOTES
Patient ID: Angela Landaverde is a 64 y.o. female.    Chief Complaint: blood pressure check         Patient came in today for blood pressure check. She reports having headaches at night, but is not having any symptoms right now and wanted to come in to get her blood pressure check.   Blood pressure was elevated in clinic and provider was notified.   Patient says that she takes her valsartan at night daily and was told to go ahead and take her medication now and come in in the morning for an appointment to make adjustments to her medication for blood pressure management.     Patient verbalized understanding.

## 2025-04-23 NOTE — PROGRESS NOTES
CC:  Knee pain    64 y.o. Female returns to clinic for a follow up visit regarding knee pain.       Patient is here today for a Synvisc-One.   Last injection was a steroid back in , 2025.  Did not provide adequate relief, only lasted a couple of months.  She is scheduled for Synvisc-One injection today.  She does have swelling on the knee today.  Painful in the medial and lateral side of her knee.  Painful to walk.           Past Medical History:   Diagnosis Date    Arthritis     Hypertension     Thyroid disease      Past Surgical History:   Procedure Laterality Date     SECTION      KNEE JOINT MANIPULATION Right 2024    Procedure: MANIPULATION, KNEE;  Surgeon: Mark Pemberton MD;  Location: [facility] OR;  Service: Orthopedics;  Laterality: Right;    ROBOTIC ARTHROPLASTY, KNEE Right 3/11/2024    Procedure: ROBOTIC ARTHROPLASTY, KNEE, TOTAL;  Surgeon: Mark Pemberton MD;  Location: [facility] OR;  Service: Orthopedics;  Laterality: Right;    TUBAL LIGATION           PHYSICAL EXAMINATION:  There were no vitals taken for this visit.  General    Constitutional: She is oriented to person, place, and time. She appears well-nourished.   HENT:   Head: Normocephalic and atraumatic.   Eyes: Pupils are equal, round, and reactive to light.   Neck: Neck supple.   Cardiovascular:  Normal rate and regular rhythm.            Pulmonary/Chest: Effort normal. No respiratory distress.   Abdominal: There is no abdominal tenderness. There is no guarding.   Neurological: She is alert and oriented to person, place, and time. She has normal reflexes.   Psychiatric: She has a normal mood and affect. Her behavior is normal. Judgment and thought content normal.           Right Knee Exam     Tests   Patella   Patellar Grind: positive    Left Knee Exam     Inspection   Swelling: present  Effusion: present    Tenderness   The patient tender to palpation of the medial joint line and lateral joint line.    Crepitus    The patient has crepitus of the patella.    Range of Motion   Extension:  normal   Flexion:  abnormal     Tests   Meniscus   Dilan:  Medial - positive   Stability   Lachman: normal (-1 to 2mm)   PCL-Posterior Drawer: normal (0 to 2mm)  Patella   Patellar Tracking: normal  Patellar Grind: positive    Other   Sensation: normal    Muscle Strength   Right Lower Extremity   Quadriceps:  5/5   Hamstrin/5   Left Lower Extremity   Quadriceps:  5/5   Hamstrin/5         IMAGING:  X-Ray Chest AP Portable  Result Date: 4/3/2025  History: Dizziness, bradycardia Date: 4/3/2025 Study: XR CHEST PORTABLE Comparison exam: No previous similar Cardiac silhouette is upper normal. There is no mediastinal mass. There is no pulmonary vascular engorgement. Lungs and pleural spaces are clear. Mild thoracic spondylosis.    Impression: No acute cardiopulmonary process       ASSESSMENT:      ICD-10-CM ICD-9-CM   1. Primary osteoarthritis of left knee  M17.12 715.16       PLAN:     -Findings and treatment options were discussed with the patient  -All questions answered  Natural history and expected course discussed. Questions answered.  Educational materials distributed.  Reduction in offending activity.  Synvisc-One injection today.  Return to clinic in 6 months for recheck    There are no Patient Instructions on file for this visit.      Orders Placed This Encounter   Procedures    Large Joint Aspiration/Injection: L supra patellar bursa    X-Ray Knee Complete 4 or More Views Left         Large Joint Aspiration/Injection: L supra patellar bursa    Date/Time: 2025 10:20 AM    Performed by: Ernestina Ramirez FNP  Authorized by: Ernestina Ramirez FNP    Consent Done?:  Yes (Verbal)  Indications:  Pain  Site marked: the procedure site was marked    Local anesthetic:  Bupivacaine 0.25% without epinephrine    Details:  Needle Size:  22 G  Location:  Knee  Site:  L supra patellar bursa  Medications:  48 mg hylan g-f 20 48 mg/6  mL  Patient tolerance:  Patient tolerated the procedure well with no immediate complications

## 2025-04-24 ENCOUNTER — OFFICE VISIT (OUTPATIENT)
Dept: FAMILY MEDICINE | Facility: CLINIC | Age: 65
End: 2025-04-24
Payer: COMMERCIAL

## 2025-04-24 VITALS
HEIGHT: 67 IN | SYSTOLIC BLOOD PRESSURE: 132 MMHG | HEART RATE: 52 BPM | WEIGHT: 293 LBS | OXYGEN SATURATION: 99 % | RESPIRATION RATE: 19 BRPM | DIASTOLIC BLOOD PRESSURE: 84 MMHG | TEMPERATURE: 97 F | BODY MASS INDEX: 45.99 KG/M2

## 2025-04-24 DIAGNOSIS — I10 ESSENTIAL HYPERTENSION: Primary | ICD-10-CM

## 2025-04-24 DIAGNOSIS — R00.1 BRADYCARDIA: ICD-10-CM

## 2025-04-24 DIAGNOSIS — R60.9 EDEMA, UNSPECIFIED TYPE: ICD-10-CM

## 2025-04-24 PROCEDURE — 3008F BODY MASS INDEX DOCD: CPT | Mod: CPTII,,,

## 2025-04-24 PROCEDURE — 4010F ACE/ARB THERAPY RXD/TAKEN: CPT | Mod: CPTII,,,

## 2025-04-24 PROCEDURE — 3075F SYST BP GE 130 - 139MM HG: CPT | Mod: CPTII,,,

## 2025-04-24 PROCEDURE — 1159F MED LIST DOCD IN RCRD: CPT | Mod: CPTII,,,

## 2025-04-24 PROCEDURE — 99213 OFFICE O/P EST LOW 20 MIN: CPT | Mod: ,,,

## 2025-04-24 PROCEDURE — 1160F RVW MEDS BY RX/DR IN RCRD: CPT | Mod: CPTII,,,

## 2025-04-24 PROCEDURE — 3079F DIAST BP 80-89 MM HG: CPT | Mod: CPTII,,,

## 2025-04-24 NOTE — PROGRESS NOTES
Subjective     Patient ID: Angela Landaverde is a 64 y.o. female.    Chief Complaint: Hypertension    Hypertension    Review of Systems  History of Present Illness    CHIEF COMPLAINT:  Patient presents today for follow up of elevated blood pressure and recent ER visit    RECENT EMERGENCY ROOM VISIT:  She experienced sudden onset of symptoms while at work including low heart rate, dizziness, and vomiting. The episode occurred at the end of a 12-hour shift while preparing for supper. She reports feeling hot, sweating, and becoming dizzy before vomiting. She was admitted to the hospital where she had cardiac evaluation. Medications were changed during the hospital stay. She was started on valsartan 40 mg at nighttime since hospital discharge. She notes having headaches. Her blood pressure has been elevated. She is also having some BLE edema. She recently completed wearing a heart monitor and is scheduled for follow up with cardiology tomorrow morning.       ROS:  General: -fever, -chills, -fatigue, -weight gain, -weight loss  Eyes: -vision changes, -redness, -discharge  ENT: -ear pain, -nasal congestion, -sore throat  Cardiovascular: -chest pain, -palpitations, +lower extremity edema  Respiratory: -cough, -shortness of breath  Gastrointestinal: -abdominal pain, -nausea, +vomiting, -diarrhea, -constipation, -blood in stool, +loss of appetite  Genitourinary: -dysuria, -hematuria, -frequency  Musculoskeletal: -joint pain, -muscle pain  Skin: -rash, -lesion  Neurological: +headache, +dizziness, -numbness, -tingling  Psychiatric: -anxiety, -depression, -sleep difficulty, +nightmares             Objective     Physical Exam  Vitals and nursing note reviewed.   Constitutional:       Appearance: Normal appearance. She is obese.   HENT:      Head: Normocephalic.      Nose: Nose normal.      Mouth/Throat:      Mouth: Mucous membranes are moist.   Eyes:      Extraocular Movements: Extraocular movements intact.      Conjunctiva/sclera:  Conjunctivae normal.      Pupils: Pupils are equal, round, and reactive to light.   Cardiovascular:      Rate and Rhythm: Regular rhythm. Bradycardia present.      Pulses: Normal pulses.      Heart sounds: Normal heart sounds.   Pulmonary:      Effort: Pulmonary effort is normal.      Breath sounds: Normal breath sounds.   Abdominal:      General: Abdomen is flat. Bowel sounds are normal.      Palpations: Abdomen is soft.   Musculoskeletal:         General: Normal range of motion.      Cervical back: Normal range of motion and neck supple.      Right lower leg: Edema present.      Left lower leg: Edema present.   Skin:     General: Skin is warm and dry.      Capillary Refill: Capillary refill takes less than 2 seconds.   Neurological:      General: No focal deficit present.      Mental Status: She is alert and oriented to person, place, and time.   Psychiatric:         Behavior: Behavior normal.         Thought Content: Thought content normal.          Assessment and Plan     1. Essential hypertension    2. Bradycardia    3. Edema, unspecified type      Assessment & Plan      Patient has follow up with cardiology in the AM   Patient to monitor blood pressure and heart rate at least twice daily and record on log   Continue valsartan until evaluated by cardiology       Follow up in about 3 months (around 7/24/2025).    This note was generated with the assistance of ambient listening technology. Verbal consent was obtained by the patient and accompanying visitor(s) for the recording of patient appointment to facilitate this note. I attest to having reviewed and edited the generated note for accuracy, though some syntax or spelling errors may persist. Please contact the author of this note for any clarification.

## 2025-05-28 ENCOUNTER — PATIENT OUTREACH (OUTPATIENT)
Facility: HOSPITAL | Age: 65
End: 2025-05-28
Payer: COMMERCIAL

## 2025-05-28 ENCOUNTER — OFFICE VISIT (OUTPATIENT)
Dept: FAMILY MEDICINE | Facility: CLINIC | Age: 65
End: 2025-05-28
Payer: COMMERCIAL

## 2025-05-28 VITALS
HEIGHT: 67 IN | HEART RATE: 64 BPM | OXYGEN SATURATION: 99 % | WEIGHT: 273 LBS | BODY MASS INDEX: 42.85 KG/M2 | SYSTOLIC BLOOD PRESSURE: 161 MMHG | DIASTOLIC BLOOD PRESSURE: 85 MMHG | TEMPERATURE: 97 F | RESPIRATION RATE: 18 BRPM

## 2025-05-28 DIAGNOSIS — E66.813 CLASS 3 SEVERE OBESITY WITH BODY MASS INDEX (BMI) OF 40.0 TO 44.9 IN ADULT, UNSPECIFIED OBESITY TYPE, UNSPECIFIED WHETHER SERIOUS COMORBIDITY PRESENT: Primary | ICD-10-CM

## 2025-05-28 DIAGNOSIS — I10 ESSENTIAL HYPERTENSION: ICD-10-CM

## 2025-05-28 DIAGNOSIS — I87.2 VENOUS INSUFFICIENCY: ICD-10-CM

## 2025-05-28 DIAGNOSIS — E66.01 CLASS 3 SEVERE OBESITY WITH BODY MASS INDEX (BMI) OF 40.0 TO 44.9 IN ADULT, UNSPECIFIED OBESITY TYPE, UNSPECIFIED WHETHER SERIOUS COMORBIDITY PRESENT: Primary | ICD-10-CM

## 2025-05-28 PROCEDURE — 3077F SYST BP >= 140 MM HG: CPT | Mod: CPTII,,,

## 2025-05-28 PROCEDURE — 3008F BODY MASS INDEX DOCD: CPT | Mod: CPTII,,,

## 2025-05-28 PROCEDURE — 4010F ACE/ARB THERAPY RXD/TAKEN: CPT | Mod: CPTII,,,

## 2025-05-28 PROCEDURE — 3079F DIAST BP 80-89 MM HG: CPT | Mod: CPTII,,,

## 2025-05-28 PROCEDURE — 99214 OFFICE O/P EST MOD 30 MIN: CPT | Mod: ,,,

## 2025-05-28 RX ORDER — NIFEDIPINE 30 MG/1
30 TABLET, EXTENDED RELEASE ORAL
COMMUNITY
Start: 2025-05-23

## 2025-05-28 RX ORDER — HYDROCHLOROTHIAZIDE 12.5 MG/1
12.5 TABLET ORAL
COMMUNITY
Start: 2025-05-23

## 2025-05-28 NOTE — PROGRESS NOTES
Population Health Chart Review & Patient Outreach Details    *2025 Pt in clinic, agreeable for pap smear to be scheduled with Dr. Maharaj. Dr. Maharaj will see pt tomorrow 2025 at 130 PM for cervical cancer screening**   Further Action Needed If Patient Returns Outreach:        Health Maintenance Due   Topic Date Due    Cervical Cancer Screening  Never done    Shingles Vaccine (1 of 2) Never done    Pneumococcal Vaccines (Age 50+) (1 of 1 - PCV) Never done    RSV Vaccine (Age 60+ and Pregnant patients) (1 - Risk 60-74 years 1-dose series) Never done    COVID-19 Vaccine ( season) 2024          Updates Requested / Reviewed:     [x]  Care Everywhere    [x]     []  External Sources ([facility], [facility], [facility], etc.)    [] [facility]   [] [facility]   [] Other:    [x]  Care Team Updated   []  Removed  or Duplicate Orders   []  Immunization Reconciliation Completed / Queried    [] [place]   [] [place]   [] [place]   [] [place]      Health Maintenance Topics Addressed and Outreach Outcomes / Actions Taken:             Breast Cancer Screening []  Mammogram Order Placed    []  Mammogram Screening Scheduled    []  External Records Requested & Care Team Updated if Applicable    []  External Records Uploaded & Care Team Updated if Applicable    []  Pt Declined Scheduling Mammogram    []  Pt Will Schedule with External Provider / Order Routed & Care Team Updated if Applicable              Cervical Cancer Screening [x]  Pap Smear Scheduled in Primary Care or OBGYN    []  External Records Requested & Care Team Updated if Applicable       []  External Records Uploaded, Care Team Updated, & History Updated if Applicable    []  Patient Declined Scheduling Pap Smear    []  Patient Will Schedule with External Provider & Care Team Updated if Applicable                  Colorectal Cancer Screening []  Colonoscopy Case Request / Referral / Home Test Order Placed    []  External Records Requested &  Care Team Updated if Applicable    []  External Records Uploaded, Care Team Updated, & History Updated if Applicable    []  Patient Declined Completing Colon Cancer Screening    []  Patient Will Schedule with External Provider & Care Team Updated if Applicable    []  Fit Kit Mailed (add the SmartPhrase under additional notes)    []  Reminded Patient to Complete Home Test                Diabetic Eye Exam []  Eye Exam Screening Order Placed    []  Eye Camera Scheduled or Optometry/Ophthalmology Referral Placed    []  External Records Requested & Care Team Updated if Applicable    []  External Records Uploaded, Care Team Updated, & History Updated if Applicable    []  Patient Declined Scheduling Eye Exam    []  Patient Will Schedule with External Provider & Care Team Updated if Applicable             Blood Pressure Control []  Primary Care Follow Up Visit Scheduled     []  Remote Blood Pressure Reading Captured    []  Patient Declined Remote Reading or Scheduling Appt - Escalated to PCP    []  Patient Will Call Back or Send Portal Message with Reading                 HbA1c & Other Labs []  Overdue Lab(s) Ordered    []  Overdue Lab(s) Scheduled    []  External Records Uploaded & Care Team Updated if Applicable    []  Primary Care Follow Up Visit Scheduled     []  Reminded Patient to Complete A1c Home Test    []  Patient Declined Scheduling Labs or Will Call Back to Schedule    []  Patient Will Schedule with External Provider / Order Routed, & Care Team Updated if Applicable           Primary Care Appointment []  Primary Care Appt Scheduled    []  Patient Declined Scheduling or Will Call Back to Schedule    []  Pt Established with External Provider, Updated Care Team, & Informed Pt to Notify Payor if Applicable           Medication Adherence /    Statin Use []  Primary Care Appointment Scheduled    []  Patient Reminded to  Prescription    []  Patient Declined, Provider Notified if Needed    []  Sent Provider  Message to Review to Evaluate Pt for Statin, Add Exclusion Dx Codes, Document   Exclusion in Problem List, Change Statin Intensity Level to Moderate or High Intensity if Applicable                Osteoporosis Screening []  Dexa Order Placed    []  Dexa Appointment Scheduled    []  External Records Requested & Care Team Updated    []  External Records Uploaded, Care Team Updated, & History Updated if Applicable    []  Patient Declined Scheduling Dexa or Will Call Back to Schedule    []  Patient Will Schedule with External Provider / Order Routed & Care Team Updated if Applicable       Additional Notes:

## 2025-05-28 NOTE — PATIENT INSTRUCTIONS
"Jonnie Miller,     If you are due for any health screening(s) below please notify me so we can arrange them to be ordered and scheduled to maintain your health. Most healthy patients complete it. Don't lose out on improving your health.     Tests to Keep You Healthy    Mammogram: Met on 11/20/2024  Colon Cancer Screening: Met on 2/1/2025  Cervical Cancer Screening: DUE  Last Blood Pressure <= 139/89 (5/28/2025): NO              Dear MsLeena Landaverde:    Your [facility] is dedicated to helping you stay healthy with regularly scheduled recommended screenings.  Scheduling routine screenings is important to maintaining good health. Our records indicate that you may be overdue for your screening pap smear. A pap smear screening can help identify patients at risk for developing cervical cancer at an early stage, when it is most likely to be successfully treated.    We encourage you to schedule your appointment with your [facility] provider or some primary care providers also perform this screening.    If you have completed or scheduled your pap smear screening outside of [facility], please notify your primary care team so we can update your health record.      If you have questions or would like to schedule your screening, please contact your primary care clinic.    Sincerely,    Your [facility]             Patient Education     Checking your blood pressure at home   The Basics   Written by the doctors and editors at [facility]   How is blood pressure measured? -- It is usually measured with a device that goes around the upper arm. This is called a "blood pressure cuff." This is often done in a doctor's office. But some people also check their blood pressure themselves, at home or at work. Doctors call this "self-measured blood pressure monitoring."  Blood pressure is explained with 2 numbers. For instance, your blood pressure might be "140 over 90." The first (top) number is the pressure inside " "your arteries when your heart is steph. The second (bottom) number is the pressure inside your arteries when your heart is relaxed. The table shows how doctors and nurses define high and normal blood pressure (table 1).  If your blood pressure gets too high, it puts you at risk for heart attack, stroke, and kidney disease. High blood pressure does not usually cause symptoms. But it can be serious.  What is a home blood pressure meter? -- A home blood pressure meter (or "monitor") is a device you can use to check your blood pressure yourself. It has a cuff that goes around your upper arm (figure 1). Some devices have a cuff that goes around your wrist instead. But doctors aren't sure if these work as well. The meter also has a small screen, or dial, that shows your blood pressure numbers.  There are also special meters you can wear for a day or 2. These are different because they automatically check your blood pressure throughout the day and night, even while you are sleeping. If your doctor thinks that you should use one of these devices, they will tell you how to wear it.  Why do I need to check my blood pressure myself? -- If your doctor knows or suspects that you have high blood pressure, they might want you to check it at home. There are a few reasons for this. Your doctor might want to look at:   Whether your blood pressure measures the same at home as it did in the doctor's office   How well your blood pressure medicines are working   Changes in your blood pressure, for example, if it goes up and down  People who check their own blood pressure usually do better at keeping it low.  How do I choose a home blood pressure meter? -- When choosing a home blood pressure meter, you will probably want to think about:   Cost - Some devices cost more than others. You should also check to see if your insurance will help pay for your device.   Size - It's important to make sure that the cuff fits your arm " comfortably. Your doctor or nurse can help you with this.   How easy it is to use - Make sure that you understand how to use the device. You also need to be able to read the numbers on the screen.  You do not need a prescription to buy a home blood pressure meter. You can buy them at most pharmacies or online. Your doctor or nurse can help you choose the right device for you. They should also check your device about once every year to make sure that it is working correctly.  How do I check my blood pressure at home? -- Once you have a home blood pressure meter, your doctor or nurse should check it to make sure that it fits you and works correctly.  When it's time to check your blood pressure:   Go to the bathroom and empty your bladder first. Having a full bladder can temporarily increase your blood pressure, making the results inaccurate.   Sit in a chair with your feet flat on the ground.   Try to breathe normally and stay calm.   Attach the cuff to your arm. Place the cuff directly on your skin, not over your clothing. The cuff should be tight enough to not slip down, but not uncomfortably tight.   Sit and relax for about 3 to 5 minutes with the cuff on.   Follow the directions that came with your device to start measuring your blood pressure. This might involve squeezing the bulb at the end of the tube to inflate the cuff (fill it with air). With some monitors, you press a button to inflate the cuff. When the cuff fills with air, it feels like someone is squeezing your arm, but it should not hurt. Then, you will slowly deflate the cuff (let the air out of it), or it will deflate by itself. The screen or dial will show your blood pressure numbers.   Stay seated and relax for 1 minute, then measure your blood pressure again.  How often should I check my blood pressure? -- It depends. Different people need to follow different schedules. Your doctor or nurse will tell you how often to check your blood pressure, and  when. Some people need to check their blood pressure twice a day, in the morning and evening.  Your doctor or nurse will probably tell you to keep track of your blood pressure for at least a few days (table 2). Then, they will look at the numbers. This is because it's normal for your blood pressure to change a bit from day to day. For example, the numbers might change depending on whether you recently had caffeine, just exercised, or feel stressed. Checking your blood pressure over several days, or longer, gives your doctor or nurse a better idea of what is average for you.  How should I keep track of my blood pressure? -- Some blood pressure meters will record your numbers for you, or send them to your computer or smartphone. If yours does not do this, you need to write them down. Your doctor or nurse can help you figure out the best way to keep track of the numbers.  What if my blood pressure is high? -- Your doctor or nurse will tell you what to do if your blood pressure is high when you check it at home. If you get a number that is higher than normal, measure it again to see if it is still high. If it is very high (above a certain number, which your doctor or nurse will tell you to watch out for), call your doctor right away.  If your blood pressure is only a little high, your doctor or nurse might tell you to keep checking it for a few more days or weeks, and then call if it does not go back down. Then, they can help you decide what to do next.  All topics are updated as new evidence becomes available and our peer review process is complete.  This topic retrieved from [employer] on: May 30, 2024.  Topic 391294 Version 11.0  Release: 32.5.3 - C32.150  © 2024 UpToDate, Inc. and/or its affiliates. All rights reserved.  table 1: Definition of normal and high blood pressure  Level  Top number  Bottom number    High 130 or above 80 or above   Elevated 120 to 129 79 or below   Normal 119 or below 79 or below   These  "definitions are from the American College of Cardiology/American Heart Association. Other expert groups might use slightly different definitions.  "Elevated blood pressure" is a term doctor or nurses use as a warning. It means you do not yet have high blood pressure, but your blood pressure is not as low as it should be for good health.  Graphic 75471 Version 6.0  figure 1: Using a home blood pressure meter     This is an example of a person using a home blood pressure meter. Blood pressure is explained with 2 numbers. For instance, your blood pressure might be "140 over 90." The "systolic" (first) number is the pressure inside your arteries when your heart is steph. The "diastolic" (second) number is the pressure inside your arteries when your heart is relaxed. Most home blood pressure monitors also show your pulse. Your pulse is the number of times your heart beats in 1 minute.  Graphic 964135 Version 2.0  table 2: 7-day diary for checking blood pressure at home  Day 1  Day 2  Day 3  Day 4  Day 5  Day 6  Day 7    Morning  1st read Morning  1st read Morning  1st read Morning  1st read Morning  1st read Morning  1st read Morning  1st read   Systolic: __________ Systolic: __________ Systolic: __________ Systolic: __________ Systolic: __________ Systolic: __________ Systolic: __________   Diastolic: __________ Diastolic: __________ Diastolic: __________ Diastolic: __________ Diastolic: __________ Diastolic: __________ Diastolic: __________   Pulse: __________ Pulse: __________ Pulse: __________ Pulse: __________ Pulse: __________ Pulse: __________ Pulse: __________   Morning  2nd read Morning  2nd read Morning  2nd read Morning  2nd read Morning  2nd read Morning  2nd read Morning  2nd read   Systolic: __________ Systolic: __________ Systolic: __________ Systolic: __________ Systolic: __________ Systolic: __________ Systolic: __________   Diastolic: __________ Diastolic: __________ Diastolic: __________ " Diastolic: __________ Diastolic: __________ Diastolic: __________ Diastolic: __________   Pulse: __________ Pulse: __________ Pulse: __________ Pulse: __________ Pulse: __________ Pulse: __________ Pulse: __________   Evening  1st read Evening  1st read Evening  1st read Evening  1st read Evening  1st read Evening  1st read Evening  1st read   Systolic: __________ Systolic: __________ Systolic: __________ Systolic: __________ Systolic: __________ Systolic: __________ Systolic: __________   Diastolic: __________ Diastolic: __________ Diastolic: __________ Diastolic: __________ Diastolic: __________ Diastolic: __________ Diastolic: __________   Pulse: __________ Pulse: __________ Pulse: __________ Pulse: __________ Pulse: __________ Pulse: __________ Pulse: __________   Evening  2nd read Evening  2nd read Evening  2nd read Evening  2nd read Evening  2nd read Evening  2nd read Evening  2nd read   Systolic: __________ Systolic: __________ Systolic: __________ Systolic: __________ Systolic: __________ Systolic: __________ Systolic: __________   Diastolic: __________ Diastolic: __________ Diastolic: __________ Diastolic: __________ Diastolic: __________ Diastolic: __________ Diastolic: __________   Pulse: __________ Pulse: __________ Pulse: __________ Pulse: __________ Pulse: __________ Pulse: __________ Pulse: __________   Notes    Notes    Notes    Notes    Notes    Notes    Notes      ____________________ ____________________ ____________________ ____________________ ____________________ ____________________ ____________________   ____________________ ____________________ ____________________ ____________________ ____________________ ____________________ ____________________   ____________________ ____________________ ____________________ ____________________ ____________________ ____________________ ____________________   Patient name: ______________________________     Patient ID: ________________________________   "  Primary care provider: _______________________    Average BP: _______________________________    You can use this table to keep track of your blood pressure (BP) and pulse.  When looking at your home meter, you will probably see at least 3 numbers:  Your "systolic" blood pressure: This is the top number of a BP measurement. For example, if your BP is "140 over 90," 140 is the systolic.  Your "diastolic" blood pressure: This is the bottom number of a BP measurement. If your BP is "140 over 90," 90 is the diastolic.  Your pulse: This is the number of times your heart beats in 1 minute.  BP: blood pressure.  Graphic 454071 Version 2.0  Consumer Information Use and Disclaimer   Disclaimer: This generalized information is a limited summary of diagnosis, treatment, and/or medication information. It is not meant to be comprehensive and should be used as a tool to help the user understand and/or assess potential diagnostic and treatment options. It does NOT include all information about conditions, treatments, medications, side effects, or risks that may apply to a specific patient. It is not intended to be medical advice or a substitute for the medical advice, diagnosis, or treatment of a health care provider based on the health care provider's examination and assessment of a patient's specific and unique circumstances. Patients must speak with a health care provider for complete information about their health, medical questions, and treatment options, including any risks or benefits regarding use of medications. This information does not endorse any treatments or medications as safe, effective, or approved for treating a specific patient. UpToDate, Inc. and its affiliates disclaim any warranty or liability relating to this information or the use thereof.The use of this information is governed by the Terms of Use, available at https://www.w[employer]tersTulokouwer.com/en/know/clinical-effectiveness-terms. 2024© UpToDate, Inc. and " its affiliates and/or licensors. All rights reserved.  Copyright   © 2024 [employer] and/or its affiliates. All rights reserved.

## 2025-06-04 ENCOUNTER — OFFICE VISIT (OUTPATIENT)
Dept: ORTHOPEDICS | Facility: CLINIC | Age: 65
End: 2025-06-04
Payer: COMMERCIAL

## 2025-06-04 VITALS
DIASTOLIC BLOOD PRESSURE: 71 MMHG | OXYGEN SATURATION: 99 % | SYSTOLIC BLOOD PRESSURE: 174 MMHG | WEIGHT: 283.63 LBS | BODY MASS INDEX: 44.52 KG/M2 | HEART RATE: 72 BPM | HEIGHT: 67 IN

## 2025-06-04 DIAGNOSIS — M17.12 PRIMARY OSTEOARTHRITIS OF LEFT KNEE: Primary | ICD-10-CM

## 2025-06-04 PROCEDURE — 99999PBSHW PR PBB SHADOW TECHNICAL ONLY FILED TO HB: Mod: PBBFAC,,,

## 2025-06-04 PROCEDURE — 20610 DRAIN/INJ JOINT/BURSA W/O US: CPT | Mod: PBBFAC,LT | Performed by: NURSE PRACTITIONER

## 2025-06-04 PROCEDURE — 99999 PR PBB SHADOW E&M-EST. PATIENT-LVL IV: CPT | Mod: PBBFAC,,, | Performed by: NURSE PRACTITIONER

## 2025-06-04 PROCEDURE — 99214 OFFICE O/P EST MOD 30 MIN: CPT | Mod: PBBFAC | Performed by: NURSE PRACTITIONER

## 2025-06-04 RX ORDER — VALSARTAN 160 MG/1
160 TABLET ORAL
COMMUNITY
Start: 2025-04-25

## 2025-06-04 RX ORDER — TRIAMCINOLONE ACETONIDE 40 MG/ML
40 INJECTION, SUSPENSION INTRA-ARTICULAR; INTRAMUSCULAR
Status: DISCONTINUED | OUTPATIENT
Start: 2025-06-04 | End: 2025-06-04 | Stop reason: HOSPADM

## 2025-06-04 RX ADMIN — TRIAMCINOLONE ACETONIDE 40 MG: 40 INJECTION, SUSPENSION INTRA-ARTICULAR; INTRAMUSCULAR at 11:06

## 2025-06-10 NOTE — PROGRESS NOTES
Subjective     Patient ID: Angela Landaverde is a 64 y.o. female.    Chief Complaint: Follow-up (64 y.o. female is here for a follow up. No complaints nor concerns at this time. )    Follow-up      Review of Systems  History of Present Illness    CHIEF COMPLAINT:  Patient presents today for follow up of blood pressure management.    HYPERTENSION:  She recently saw cardiologist who adjusted her medications. Current regimen includes Nifedipine, Valsartan, and a newly added diuretic. She was advised to be cautious with diuretic use due to concurrent gout issues.    MUSCULOSKELETAL:  She has a history of knee replacement. She recently received a gel injection for knee pain, expected to last six months. She reports significant leg pain, particularly bothersome at night, leading her to consider knee surgery. She takes gabapentin nightly for leg pain and uses Robaxin as needed, which she finds effective.    WEIGHT MANAGEMENT:  She reports recent weight loss of 5-6 lbs. Sleep study is scheduled for July 1st to assess for sleep apnea. She has been taking contrave, which she states has helped her control her appetite and wishes to continue the medication.      ROS:  General: -fever, -chills, -fatigue, -weight gain, -weight loss  Eyes: -vision changes, -redness, -discharge  ENT: -ear pain, -nasal congestion, -sore throat  Cardiovascular: -chest pain, -palpitations, -lower extremity edema  Respiratory: -cough, -shortness of breath  Gastrointestinal: -abdominal pain, -nausea, -vomiting, -diarrhea, -constipation, -blood in stool  Genitourinary: -dysuria, -hematuria, -frequency  Musculoskeletal: +joint pain, -muscle pain, +limb pain, +pain with movement, +nightime pain  Skin: -rash, -lesion  Neurological: +headache, -dizziness, -numbness, -tingling  Psychiatric: -anxiety, -depression, -sleep difficulty             Objective     Physical Exam  Vitals and nursing note reviewed.   Constitutional:       Appearance: Normal appearance. She is  obese.   HENT:      Head: Normocephalic.      Nose: Nose normal.      Mouth/Throat:      Mouth: Mucous membranes are moist.   Eyes:      Extraocular Movements: Extraocular movements intact.      Conjunctiva/sclera: Conjunctivae normal.      Pupils: Pupils are equal, round, and reactive to light.   Cardiovascular:      Rate and Rhythm: Normal rate and regular rhythm.      Pulses: Normal pulses.      Heart sounds: Normal heart sounds.   Pulmonary:      Effort: Pulmonary effort is normal.      Breath sounds: Normal breath sounds.   Abdominal:      General: Abdomen is flat. Bowel sounds are normal.      Palpations: Abdomen is soft.   Musculoskeletal:         General: Normal range of motion.      Cervical back: Normal range of motion and neck supple.      Right lower leg: Edema present.      Left lower leg: Edema present.   Skin:     General: Skin is warm and dry.      Capillary Refill: Capillary refill takes less than 2 seconds.   Neurological:      General: No focal deficit present.      Mental Status: She is alert and oriented to person, place, and time.   Psychiatric:         Behavior: Behavior normal.         Thought Content: Thought content normal.            Assessment and Plan     1. Class 3 severe obesity with body mass index (BMI) of 40.0 to 44.9 in adult, unspecified obesity type, unspecified whether serious comorbidity present  -     naltrexone-bupropion (CONTRAVE) 8-90 mg TbSR; Take 2 tablets by mouth 2 (two) times daily.  Dispense: 120 tablet; Refill: 0    2. Essential hypertension    3. Venous insufficiency      Assessment & Plan      Will obtain recent clinic note from cardiology to review medication changes, as patient is unclear as to what she is to take  Continue contrave  Recommend high protein, low carbohydrate, low fat diet  Increase PO water intake  Recommend getting 20 minutes of exercise at least 3 days per week         Follow up in about 4 weeks (around 6/25/2025).    This note was generated  with the assistance of ambient listening technology. Verbal consent was obtained by the patient and accompanying visitor(s) for the recording of patient appointment to facilitate this note. I attest to having reviewed and edited the generated note for accuracy, though some syntax or spelling errors may persist. Please contact the author of this note for any clarification.

## 2025-06-21 DIAGNOSIS — M19.90 ARTHRITIS: ICD-10-CM

## 2025-06-25 RX ORDER — DICLOFENAC SODIUM 50 MG/1
50 TABLET, DELAYED RELEASE ORAL 2 TIMES DAILY PRN
Qty: 60 TABLET | Refills: 0 | Status: SHIPPED | OUTPATIENT
Start: 2025-06-25

## 2025-07-06 DIAGNOSIS — E66.01 CLASS 3 SEVERE OBESITY WITH BODY MASS INDEX (BMI) OF 40.0 TO 44.9 IN ADULT, UNSPECIFIED OBESITY TYPE, UNSPECIFIED WHETHER SERIOUS COMORBIDITY PRESENT: ICD-10-CM

## 2025-07-06 DIAGNOSIS — E66.813 CLASS 3 SEVERE OBESITY WITH BODY MASS INDEX (BMI) OF 40.0 TO 44.9 IN ADULT, UNSPECIFIED OBESITY TYPE, UNSPECIFIED WHETHER SERIOUS COMORBIDITY PRESENT: ICD-10-CM

## 2025-07-11 RX ORDER — NALTREXONE HYDROCHLORIDE AND BUPROPION HYDROCHLORIDE 8; 90 MG/1; MG/1
2 TABLET, EXTENDED RELEASE ORAL 2 TIMES DAILY
Qty: 120 TABLET | Refills: 0 | Status: SHIPPED | OUTPATIENT
Start: 2025-07-11

## 2025-07-22 DIAGNOSIS — M19.90 ARTHRITIS: ICD-10-CM

## 2025-07-23 RX ORDER — DICLOFENAC SODIUM 50 MG/1
50 TABLET, DELAYED RELEASE ORAL 2 TIMES DAILY PRN
Qty: 60 TABLET | Refills: 0 | Status: SHIPPED | OUTPATIENT
Start: 2025-07-23

## 2025-08-01 ENCOUNTER — PATIENT MESSAGE (OUTPATIENT)
Facility: HOSPITAL | Age: 65
End: 2025-08-01
Payer: COMMERCIAL

## 2025-08-11 DIAGNOSIS — E03.9 HYPOTHYROIDISM, UNSPECIFIED TYPE: ICD-10-CM

## 2025-08-11 RX ORDER — LEVOTHYROXINE SODIUM 88 UG/1
88 TABLET ORAL
Qty: 90 TABLET | Refills: 1 | Status: SHIPPED | OUTPATIENT
Start: 2025-08-11

## 2025-08-22 DIAGNOSIS — M19.90 ARTHRITIS: ICD-10-CM

## 2025-08-22 RX ORDER — DICLOFENAC SODIUM 50 MG/1
50 TABLET, DELAYED RELEASE ORAL 2 TIMES DAILY PRN
Qty: 60 TABLET | Refills: 0 | Status: SHIPPED | OUTPATIENT
Start: 2025-08-22

## (undated) DEVICE — SOL NACL 0.9% INJ 500ML BG

## (undated) DEVICE — VELYS DEVICE STERILE DRAPE

## (undated) DEVICE — GLOVE SENSICARE PI GRN 6.5

## (undated) DEVICE — GLOVE 7.0 PROTEXIS PI BLUE

## (undated) DEVICE — GLOVE SENSICARE PI ALOE 7.5

## (undated) DEVICE — SET CYSTO IRR DRP CHMBR 84IN

## (undated) DEVICE — APPLICATOR CHLORAPREP ORN 26ML

## (undated) DEVICE — DRAPE INCISE IOBAN 2 13X13IN

## (undated) DEVICE — SPACESUIT TOGA T5 ZIPPER PEEL

## (undated) DEVICE — GOWN TOGA SYS PEELWY ZIP 2 XL

## (undated) DEVICE — NDL QUINCKE SPINAL 18G 3.5IN

## (undated) DEVICE — GLOVE 6.0 PROTEXIS PI BLUE

## (undated) DEVICE — BLADE PERFORMANCE SAG 21X90MM

## (undated) DEVICE — SOL NACL IRR 1000ML BTL

## (undated) DEVICE — SYR 30CC LUER LOCK

## (undated) DEVICE — VELYS ROBOT-ASSISTED SOLUTION ARRAY DRILL PIN 175 MM X 4 MM
Type: IMPLANTABLE DEVICE | Site: KNEE | Status: NON-FUNCTIONAL
Brand: VELYS
Removed: 2024-03-11

## (undated) DEVICE — KIT TOTAL KNEE RUSH

## (undated) DEVICE — VELYS SATELLITE STATION STERILE DRAPE

## (undated) DEVICE — GLOVE 8 PROTEXIS PI BLUE

## (undated) DEVICE — SODIUM CHLORIDE 0.9% 1000ML

## (undated) DEVICE — DRESSING GAUZE XEROFORM 5X9

## (undated) DEVICE — OVERLAY MATTRESS WAFFLE

## (undated) DEVICE — CUFF TOURNIQUET STER DIS 42

## (undated) DEVICE — DRAPE INCISE IOBAN 2 23X23IN

## (undated) DEVICE — SPONGE COTTON TRAY 4X4IN

## (undated) DEVICE — DRESSING PICO 7 TWO 10X30CM

## (undated) DEVICE — GLOVE SENSICARE PI GRN 8

## (undated) DEVICE — GLOVE SENSICARE PI ALOE 6

## (undated) DEVICE — GLOVE BIOGEL SKINSENSE PI 7.0

## (undated) DEVICE — GLOVE SENSICARE PI GRN 7.5

## (undated) DEVICE — BLADE RECIP DOUBLE SIDED

## (undated) DEVICE — SUT VICRYL CTD 1 27IN CP

## (undated) DEVICE — SUT 2-0 VICRYL / CT-1

## (undated) DEVICE — STAPLER SKIN WIDE

## (undated) DEVICE — KIT IRR SUCTION HND PIECE

## (undated) DEVICE — VELYS OSCILLATING SAW BLADE

## (undated) DEVICE — HANDLE MEDI-VAC YANKAUER STR

## (undated) DEVICE — Device

## (undated) DEVICE — TOWER MIX CEMENT BONE SMARTMIX